# Patient Record
Sex: FEMALE | Race: WHITE | NOT HISPANIC OR LATINO | Employment: OTHER | URBAN - METROPOLITAN AREA
[De-identification: names, ages, dates, MRNs, and addresses within clinical notes are randomized per-mention and may not be internally consistent; named-entity substitution may affect disease eponyms.]

---

## 2017-03-28 ENCOUNTER — TRANSCRIBE ORDERS (OUTPATIENT)
Dept: ADMINISTRATIVE | Facility: HOSPITAL | Age: 82
End: 2017-03-28

## 2017-03-28 DIAGNOSIS — M19.011 OSTEOARTHRITIS OF RIGHT SHOULDER, UNSPECIFIED OSTEOARTHRITIS TYPE: Primary | ICD-10-CM

## 2017-04-10 ENCOUNTER — LAB (OUTPATIENT)
Dept: LAB | Facility: HOSPITAL | Age: 82
End: 2017-04-10

## 2017-04-10 ENCOUNTER — OFFICE VISIT (OUTPATIENT)
Dept: ONCOLOGY | Facility: CLINIC | Age: 82
End: 2017-04-10

## 2017-04-10 VITALS
SYSTOLIC BLOOD PRESSURE: 142 MMHG | BODY MASS INDEX: 35.9 KG/M2 | RESPIRATION RATE: 18 BRPM | TEMPERATURE: 98.1 F | OXYGEN SATURATION: 90 % | HEIGHT: 66 IN | HEART RATE: 68 BPM | WEIGHT: 223.4 LBS | DIASTOLIC BLOOD PRESSURE: 84 MMHG

## 2017-04-10 DIAGNOSIS — C50.919 MALIGNANT NEOPLASM OF FEMALE BREAST, UNSPECIFIED LATERALITY, UNSPECIFIED SITE OF BREAST: ICD-10-CM

## 2017-04-10 DIAGNOSIS — C50.919 MALIGNANT NEOPLASM OF FEMALE BREAST, UNSPECIFIED LATERALITY, UNSPECIFIED SITE OF BREAST: Primary | ICD-10-CM

## 2017-04-10 LAB
ALBUMIN SERPL-MCNC: 4 G/DL (ref 3.5–5.2)
ALBUMIN/GLOB SERPL: 1.1 G/DL (ref 1.1–2.4)
ALP SERPL-CCNC: 76 U/L (ref 38–116)
ALT SERPL W P-5'-P-CCNC: 15 U/L (ref 0–33)
ANION GAP SERPL CALCULATED.3IONS-SCNC: 8.9 MMOL/L
AST SERPL-CCNC: 20 U/L (ref 0–32)
BASOPHILS # BLD AUTO: 0.09 10*3/MM3 (ref 0–0.1)
BASOPHILS NFR BLD AUTO: 1.5 % (ref 0–1.1)
BILIRUB SERPL-MCNC: 0.3 MG/DL (ref 0.1–1.2)
BUN BLD-MCNC: 14 MG/DL (ref 6–20)
BUN/CREAT SERPL: 19.7 (ref 7.3–30)
CALCIUM SPEC-SCNC: 9.6 MG/DL (ref 8.5–10.2)
CHLORIDE SERPL-SCNC: 96 MMOL/L (ref 98–107)
CO2 SERPL-SCNC: 30.1 MMOL/L (ref 22–29)
CREAT BLD-MCNC: 0.71 MG/DL (ref 0.6–1.1)
DEPRECATED RDW RBC AUTO: 47.8 FL (ref 37–49)
EOSINOPHIL # BLD AUTO: 0.25 10*3/MM3 (ref 0–0.36)
EOSINOPHIL NFR BLD AUTO: 4 % (ref 1–5)
ERYTHROCYTE [DISTWIDTH] IN BLOOD BY AUTOMATED COUNT: 13.5 % (ref 11.7–14.5)
GFR SERPL CREATININE-BSD FRML MDRD: 79 ML/MIN/1.73
GLOBULIN UR ELPH-MCNC: 3.5 GM/DL (ref 1.8–3.5)
GLUCOSE BLD-MCNC: 79 MG/DL (ref 74–124)
HCT VFR BLD AUTO: 36.2 % (ref 34–45)
HGB BLD-MCNC: 11.7 G/DL (ref 11.5–14.9)
HOLD SPECIMEN: NORMAL
IMM GRANULOCYTES # BLD: 0.01 10*3/MM3 (ref 0–0.03)
IMM GRANULOCYTES NFR BLD: 0.2 % (ref 0–0.5)
LYMPHOCYTES # BLD AUTO: 1.38 10*3/MM3 (ref 1–3.5)
LYMPHOCYTES NFR BLD AUTO: 22.3 % (ref 20–49)
MCH RBC QN AUTO: 31 PG (ref 27–33)
MCHC RBC AUTO-ENTMCNC: 32.3 G/DL (ref 32–35)
MCV RBC AUTO: 96 FL (ref 83–97)
MONOCYTES # BLD AUTO: 0.66 10*3/MM3 (ref 0.25–0.8)
MONOCYTES NFR BLD AUTO: 10.6 % (ref 4–12)
NEUTROPHILS # BLD AUTO: 3.81 10*3/MM3 (ref 1.5–7)
NEUTROPHILS NFR BLD AUTO: 61.4 % (ref 39–75)
NRBC BLD MANUAL-RTO: 0 /100 WBC (ref 0–0)
PLATELET # BLD AUTO: 256 10*3/MM3 (ref 150–375)
PMV BLD AUTO: 8.8 FL (ref 8.9–12.1)
POTASSIUM BLD-SCNC: 4.5 MMOL/L (ref 3.5–4.7)
PROT SERPL-MCNC: 7.5 G/DL (ref 6.3–8)
RBC # BLD AUTO: 3.77 10*6/MM3 (ref 3.9–5)
SODIUM BLD-SCNC: 135 MMOL/L (ref 134–145)
WBC NRBC COR # BLD: 6.2 10*3/MM3 (ref 4–10)

## 2017-04-10 PROCEDURE — 36415 COLL VENOUS BLD VENIPUNCTURE: CPT

## 2017-04-10 PROCEDURE — 85025 COMPLETE CBC W/AUTO DIFF WBC: CPT

## 2017-04-10 PROCEDURE — 99213 OFFICE O/P EST LOW 20 MIN: CPT | Performed by: INTERNAL MEDICINE

## 2017-04-10 PROCEDURE — 80053 COMPREHEN METABOLIC PANEL: CPT

## 2017-04-10 NOTE — PROGRESS NOTES
REASONS FOR FOLLOWUP:  1. 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 ER positive HER2/joelle negative.   2. Initiated Arimidex on 04/30/2012.  3. Osteoporosis currently managed by her primary care physician, Dr. Anne Brooks.   4. 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 Nguyen on 4/25/13 (DCIS measured 2.5 cm, high grade with comedo necrosis and widely negative margins).  5. Iron deficiency anemia, improved with oral iron therapy--currently managed by         History of Present Illness    Ms. Tidwell returns today for follow-up of her history of breast cancer.  She returns today doing very well.  She has noted no changes to the scars of the mastectomy sites bilaterally.  She denies unusual pain other than right shoulder pain secondary to arthritis and is scheduled to have a shoulder replacement in the near future.  She denies shortness of breath or other concerns.    Past Medical History, Past Surgical History, Social History, Family History have been reviewed and are without significant changes except as mentioned.    Review of Systems   A comprehensive 14 point review of systems was performed and was negative except as mentioned.    Medications:  The current medication list was reviewed in the EMR    ALLERGIES:    Allergies   Allergen Reactions   • Aspirin    • Grass    • Morphine And Related    • Other      Trees       Objective      There were no vitals filed for this visit.  Current Status 10/3/2016   ECOG score 1       Physical Exam   Constitutional: She is oriented to person, place, and time. She appears well-developed and well-nourished.   Cardiovascular: Normal rate and regular rhythm.    Pulmonary/Chest: Effort normal and breath sounds normal.   bilat mastectomy scars well healed with no nodules or rashes   Musculoskeletal:   Decreased range of motion right shoulder   Lymphadenopathy:     She has no cervical adenopathy.   Neurological: She is alert  and oriented to person, place, and time.   Skin: Skin is warm.   Psychiatric: She has a normal mood and affect.          RECENT LABS:  Hematology WBC   Date Value Ref Range Status   10/03/2016 6.72 4.00 - 10.00 10*3/mm3 Final     RBC   Date Value Ref Range Status   10/03/2016 3.87 (L) 3.90 - 5.00 10*6/mm3 Final     Hemoglobin   Date Value Ref Range Status   10/03/2016 12.2 11.5 - 14.9 g/dL Final     Hematocrit   Date Value Ref Range Status   10/03/2016 37.0 34.0 - 45.0 % Final     Platelets   Date Value Ref Range Status   10/03/2016 276 150 - 375 10*3/mm3 Final          Assessment/Plan   1. Pathologic [id number] ductal cancer of the right breast, status post mastectomy in  April 2012 for which she has been on adjuvant Arimidex since 04/30/12 and now completed 5 years of hormonal therapy.  I recommended that she discontinue Arimidex at this time having completed adequate adjuvant therapy.  2. DCIS of the left breast, status post mastectomy 05/26/2013 with wide margins.   3.  History of iron deficiency anemia, hemoglobin normal today's visit.  She has been off oral iron for several months.  4.  Osteoporosis being managed by her PCP.    5.  Since the patient has completed 5 years of adjuvant hormonal therapy for breast cancer, she would prefer to follow routinely with her primary care physician.  I recommended she have a chest wall exam once or twice yearly.  If she develops any unusual pain, shortness of breath, etc. imaging should be performed in the setting of her breast cancer history.  I would be happy to see the patient back at any time if the need arises.                  4/10/2017      CC:

## 2017-06-08 ENCOUNTER — APPOINTMENT (OUTPATIENT)
Dept: PREADMISSION TESTING | Facility: HOSPITAL | Age: 82
End: 2017-06-08

## 2017-06-08 ENCOUNTER — HOSPITAL ENCOUNTER (OUTPATIENT)
Dept: CT IMAGING | Facility: HOSPITAL | Age: 82
Discharge: HOME | End: 2017-06-08
Attending: ORTHOPAEDIC SURGERY | Admitting: ORTHOPAEDIC SURGERY

## 2017-06-08 ENCOUNTER — HOSPITAL ENCOUNTER (OUTPATIENT)
Dept: GENERAL RADIOLOGY | Facility: HOSPITAL | Age: 82
Discharge: HOME | End: 2017-06-08

## 2017-06-08 DIAGNOSIS — M19.011 OSTEOARTHRITIS OF RIGHT SHOULDER, UNSPECIFIED OSTEOARTHRITIS TYPE: ICD-10-CM

## 2017-06-08 LAB
ABO GROUP BLD: NORMAL
ANION GAP SERPL CALCULATED.3IONS-SCNC: 10.9 MMOL/L
ANTI-E: NORMAL
BACTERIA UR QL AUTO: NORMAL /HPF
BILIRUB UR QL STRIP: NEGATIVE
BLD GP AB SCN SERPL QL: POSITIVE
BUN BLD-MCNC: 11 MG/DL (ref 8–23)
BUN/CREAT SERPL: 14.3 (ref 7–25)
CALCIUM SPEC-SCNC: 9.5 MG/DL (ref 8.6–10.5)
CHLORIDE SERPL-SCNC: 95 MMOL/L (ref 98–107)
CLARITY UR: CLEAR
CO2 SERPL-SCNC: 29.1 MMOL/L (ref 22–29)
COLOR UR: YELLOW
CREAT BLD-MCNC: 0.77 MG/DL (ref 0.57–1)
DEPRECATED RDW RBC AUTO: 46.8 FL (ref 37–54)
E AG RBC QL: NEGATIVE
ERYTHROCYTE [DISTWIDTH] IN BLOOD BY AUTOMATED COUNT: 13.5 % (ref 11.7–13)
GFR SERPL CREATININE-BSD FRML MDRD: 72 ML/MIN/1.73
GLUCOSE BLD-MCNC: 103 MG/DL (ref 65–99)
GLUCOSE UR STRIP-MCNC: NEGATIVE MG/DL
HCT VFR BLD AUTO: 36.5 % (ref 35.6–45.5)
HGB BLD-MCNC: 12.1 G/DL (ref 11.9–15.5)
HGB UR QL STRIP.AUTO: NEGATIVE
HYALINE CASTS UR QL AUTO: NORMAL /LPF
KETONES UR QL STRIP: NEGATIVE
LEUKOCYTE ESTERASE UR QL STRIP.AUTO: ABNORMAL
LITTLE C: NEGATIVE
MCH RBC QN AUTO: 31.5 PG (ref 26.9–32)
MCHC RBC AUTO-ENTMCNC: 33.2 G/DL (ref 32.4–36.3)
MCV RBC AUTO: 95.1 FL (ref 80.5–98.2)
NITRITE UR QL STRIP: NEGATIVE
PH UR STRIP.AUTO: 7.5 [PH] (ref 5–8)
PLATELET # BLD AUTO: 275 10*3/MM3 (ref 140–500)
PMV BLD AUTO: 9.8 FL (ref 6–12)
POTASSIUM BLD-SCNC: 4.2 MMOL/L (ref 3.5–5.2)
PROT UR QL STRIP: NEGATIVE
RBC # BLD AUTO: 3.84 10*6/MM3 (ref 3.9–5.2)
RBC # UR: NORMAL /HPF
REF LAB TEST METHOD: NORMAL
RH BLD: POSITIVE
SODIUM BLD-SCNC: 135 MMOL/L (ref 136–145)
SP GR UR STRIP: 1.01 (ref 1–1.03)
SQUAMOUS #/AREA URNS HPF: NORMAL /HPF
UROBILINOGEN UR QL STRIP: ABNORMAL
WBC NRBC COR # BLD: 6.52 10*3/MM3 (ref 4.5–10.7)
WBC UR QL AUTO: NORMAL /HPF

## 2017-06-08 PROCEDURE — 86900 BLOOD TYPING SEROLOGIC ABO: CPT | Performed by: ORTHOPAEDIC SURGERY

## 2017-06-08 PROCEDURE — 86905 BLOOD TYPING RBC ANTIGENS: CPT | Performed by: ORTHOPAEDIC SURGERY

## 2017-06-08 PROCEDURE — 36415 COLL VENOUS BLD VENIPUNCTURE: CPT

## 2017-06-08 PROCEDURE — 86902 BLOOD TYPE ANTIGEN DONOR EA: CPT

## 2017-06-08 PROCEDURE — 86850 RBC ANTIBODY SCREEN: CPT | Performed by: ORTHOPAEDIC SURGERY

## 2017-06-08 PROCEDURE — 93010 ELECTROCARDIOGRAM REPORT: CPT | Performed by: INTERNAL MEDICINE

## 2017-06-08 PROCEDURE — 81001 URINALYSIS AUTO W/SCOPE: CPT | Performed by: ORTHOPAEDIC SURGERY

## 2017-06-08 PROCEDURE — 86870 RBC ANTIBODY IDENTIFICATION: CPT | Performed by: ORTHOPAEDIC SURGERY

## 2017-06-08 PROCEDURE — 86920 COMPATIBILITY TEST SPIN: CPT

## 2017-06-08 PROCEDURE — 93005 ELECTROCARDIOGRAM TRACING: CPT

## 2017-06-08 PROCEDURE — 73030 X-RAY EXAM OF SHOULDER: CPT

## 2017-06-08 PROCEDURE — 86922 COMPATIBILITY TEST ANTIGLOB: CPT

## 2017-06-08 PROCEDURE — 85027 COMPLETE CBC AUTOMATED: CPT | Performed by: ORTHOPAEDIC SURGERY

## 2017-06-08 PROCEDURE — 80048 BASIC METABOLIC PNL TOTAL CA: CPT | Performed by: ORTHOPAEDIC SURGERY

## 2017-06-08 PROCEDURE — 86901 BLOOD TYPING SEROLOGIC RH(D): CPT | Performed by: ORTHOPAEDIC SURGERY

## 2017-06-08 PROCEDURE — 73200 CT UPPER EXTREMITY W/O DYE: CPT

## 2017-06-08 NOTE — DISCHARGE INSTRUCTIONS
Take the following medications the morning of surgery with a small sip of water:        General Instructions:  • Do not eat solid food after midnight the night before surgery.  • You may drink clear liquids day of surgery but must stop at least one hour before your hospital arrival time.  • It is beneficial for you to have a clear drink that contains carbohydrates the day of surgery.  We suggest a 20 ounce bottle of Gatorade or Powerade for non-diabetic patients or a 20 ounce bottle of G2 or Powerade Zero for diabetic patients. (Pediatric patients, are not advised to drink a 20 ounce carbohydrate drink)    Clear liquids are liquids you can see through.  Nothing red in color.     Plain water                               Sports drinks  Sodas                                   Gelatin (Jell-O)  Fruit juices without pulp such as white grape juice and apple juice  Popsicles that contain no fruit or yogurt  Tea or coffee (no cream or milk added)  Gatorade / Powerade  G2 / Powerade Zero    • Infants may have breast milk up to four hours before surgery.  • Infants drinking formula may drink formula up to six hours before surgery.   • Patients who avoid smoking, chewing tobacco and alcohol for 4 weeks prior to surgery have a reduced risk of post-operative complications.  Quit smoking as many days before surgery as you can.  • Do not smoke, use chewing tobacco or drink alcohol the day of surgery.   • If applicable bring your C-PAP/ BI-PAP machine.  • Bring any papers given to you in the doctor’s office.  • Wear clean comfortable clothes and socks.  • Do not wear contact lenses or make-up.  Bring a case for your glasses.   • Bring crutches or walker if applicable.  • Leave all other valuables and jewelry at home.  • The Pre-Admission Testing nurse will instruct you to bring medications if unable to obtain an accurate list in Pre-Admission Testing.        If you were given a blood bank ID arm band remember to bring it with you  the day of surgery.    Preventing a Surgical Site Infection:  • For 2 to 3 days before surgery, avoid shaving with a razor because the razor can irritate skin and make it easier to develop an infection.  • The night prior to surgery sleep in a clean bed with clean clothing.  Do not allow pets to sleep with you.  • Shower on the morning of surgery using a fresh bar of anti-bacterial soap (such as Dial) and clean washcloth.  Dry with a clean towel and dress in clean clothing.  • Ask your surgeon if you will be receiving antibiotics prior to surgery.  • Make sure you, your family, and all healthcare providers clean their hands with soap and water or an alcohol based hand  before caring for you or your wound.    Day of surgery:  Upon arrival, a Pre-op nurse and Anesthesiologist will review your health history, obtain vital signs, and answer questions you may have.  The only belongings needed at this time will be your home medications and if applicable your C-PAP/BI-PAP machine.  If you are staying overnight your family can leave the rest of your belongings in the car and bring them to your room later.  A Pre-op nurse will start an IV and you may receive medication in preparation for surgery, including something to help you relax.  Your family will be able to see you in the Pre-op area.  While you are in surgery your family should notify the waiting room  if they leave the waiting room area and provide a contact phone number.    Please be aware that surgery does come with discomfort.  We want to make every effort to control your discomfort so please discuss any uncontrolled symptoms with your nurse.   Your doctor will most likely have prescribed pain medications.      If you are going home after surgery you will receive individualized written care instructions before being discharged.  A responsible adult must drive you to and from the hospital on the day of your surgery and stay with you for 24  hours.    If you are staying overnight following surgery, you will be transported to your hospital room following the recovery period.  [facility] has all private rooms.    If you have any questions please call Pre-Admission Testing at [phone].  Deductibles and co-payments are collected on the day of service. Please be prepared to pay the required co-pay, deductible or deposit on the day of service as defined by your plan.Take the following medications the morning of surgery with a small sip of water:        General Instructions:  • Do not eat solid food after midnight the night before surgery.  • You may drink clear liquids day of surgery but must stop at least one hour before your hospital arrival time.  • It is beneficial for you to have a clear drink that contains carbohydrates the day of surgery.  We suggest a 20 ounce bottle of Gatorade or Powerade for non-diabetic patients or a 20 ounce bottle of G2 or Powerade Zero for diabetic patients. (Pediatric patients, are not advised to drink a 20 ounce carbohydrate drink)    Clear liquids are liquids you can see through.  Nothing red in color.     Plain water                               Sports drinks  Sodas                                   Gelatin (Jell-O)  Fruit juices without pulp such as white grape juice and apple juice  Popsicles that contain no fruit or yogurt  Tea or coffee (no cream or milk added)  Gatorade / Powerade  G2 / Powerade Zero    • Infants may have breast milk up to four hours before surgery.  • Infants drinking formula may drink formula up to six hours before surgery.   • Patients who avoid smoking, chewing tobacco and alcohol for 4 weeks prior to surgery have a reduced risk of post-operative complications.  Quit smoking as many days before surgery as you can.  • Do not smoke, use chewing tobacco or drink alcohol the day of surgery.   • If applicable bring your C-PAP/ BI-PAP machine.  • Bring any papers given to you in the  doctor’s office.  • Wear clean comfortable clothes and socks.  • Do not wear contact lenses or make-up.  Bring a case for your glasses.   • Bring crutches or walker if applicable.  • Leave all other valuables and jewelry at home.  • The Pre-Admission Testing nurse will instruct you to bring medications if unable to obtain an accurate list in Pre-Admission Testing.        If you were given a blood bank ID arm band remember to bring it with you the day of surgery.    Preventing a Surgical Site Infection:  • For 2 to 3 days before surgery, avoid shaving with a razor because the razor can irritate skin and make it easier to develop an infection.  • The night prior to surgery sleep in a clean bed with clean clothing.  Do not allow pets to sleep with you.  • Shower on the morning of surgery using a fresh bar of anti-bacterial soap (such as Dial) and clean washcloth.  Dry with a clean towel and dress in clean clothing.  • Ask your surgeon if you will be receiving antibiotics prior to surgery.  • Make sure you, your family, and all healthcare providers clean their hands with soap and water or an alcohol based hand  before caring for you or your wound.    Day of surgery:  Upon arrival, a Pre-op nurse and Anesthesiologist will review your health history, obtain vital signs, and answer questions you may have.  The only belongings needed at this time will be your home medications and if applicable your C-PAP/BI-PAP machine.  If you are staying overnight your family can leave the rest of your belongings in the car and bring them to your room later.  A Pre-op nurse will start an IV and you may receive medication in preparation for surgery, including something to help you relax.  Your family will be able to see you in the Pre-op area.  While you are in surgery your family should notify the waiting room  if they leave the waiting room area and provide a contact phone number.    Please be aware that surgery does  come with discomfort.  We want to make every effort to control your discomfort so please discuss any uncontrolled symptoms with your nurse.   Your doctor will most likely have prescribed pain medications.      If you are going home after surgery you will receive individualized written care instructions before being discharged.  A responsible adult must drive you to and from the hospital on the day of your surgery and stay with you for 24 hours.    If you are staying overnight following surgery, you will be transported to your hospital room following the recovery period.  [facility] has all private rooms.    If you have any questions please call Pre-Admission Testing at [phone].  Deductibles and co-payments are collected on the day of service. Please be prepared to pay the required co-pay, deductible or deposit on the day of service as defined by your plan.    2% CHLORAHEXIDINE GLUCONATE* CLOTH  Preparing or “prepping” skin before surgery can reduce the risk of infection at the surgical site. To make the process easier, [facility] has chosen disposable cloths moistened with a rinse-free, 2% Chlorhexidine Gluconate (CHG) antiseptic solution. The steps below outline the prepping process and should be carefully followed.        Use the prep cloth on the area that is circled in the diagram             Directions Night before Surgery  1) Shower using a fresh bar of anti-bacterial soap (such as Dial) and clean washcloth.  Use a clean towel to completely dry your skin.  2) Do not use any lotions, oils or creams on your skin.  3) Open the package and remove 1 cloth, wipe your skin for 30 seconds in a circular motion.  Allow to dry for 3 minutes.  4) Repeat #3 with second cloth.  5) Do not touch your eyes, ears, or mouth with the prep cloth.  6) Allow the wet prep solution to air dry.  7) Discard the prep cloth and wash your hands with soap and water.   8) Dress in clean bed clothes and sleep on  fresh clean bed sheets.   9) You may experience some temporary itching after the prep.    Directions Day of Surgery  1) Repeat steps 1,2,3,4,5,6,7, and 9.   2) Dress in clean clothes before coming to the hospital.    BACTROBAN NASAL OINTMENT  There are many germs normally in your nose. Bactroban is an ointment that will help reduce these germs. Please follow these instructions for Bactroban use:    ____Two days before surgery in the evening Date________    ____The day before surgery in the morning  Date________    ____The day before surgery in the evening              Date________    ____The day of surgery in the morning    Date________    **Squirt ½ package of Bactroban Ointment onto a cotton applicator and apply to inside of 1st nostril.  Squirt the remaining Bactroban and apply to the inside of the other nostril.    PERIDEX- ORAL:  Use only if your surgeon has ordered  Use the night before and morning of surgery - Swish, gargle, and spit - do not swallow.USE AS DIRECTED PREOPUSE AS DIRECTED PREOP

## 2017-06-10 PROCEDURE — 86902 BLOOD TYPE ANTIGEN DONOR EA: CPT

## 2017-06-22 ENCOUNTER — ANESTHESIA EVENT (OUTPATIENT)
Dept: PERIOP | Facility: HOSPITAL | Age: 82
End: 2017-06-22

## 2017-06-22 ENCOUNTER — HOSPITAL ENCOUNTER (INPATIENT)
Facility: HOSPITAL | Age: 82
LOS: 1 days | Discharge: HOME | End: 2017-06-23
Attending: ORTHOPAEDIC SURGERY | Admitting: ORTHOPAEDIC SURGERY

## 2017-06-22 ENCOUNTER — ANESTHESIA (OUTPATIENT)
Dept: PERIOP | Facility: HOSPITAL | Age: 82
End: 2017-06-22

## 2017-06-22 ENCOUNTER — APPOINTMENT (OUTPATIENT)
Dept: GENERAL RADIOLOGY | Facility: HOSPITAL | Age: 82
End: 2017-06-22

## 2017-06-22 DIAGNOSIS — R26.89 IMPAIRED GAIT AND MOBILITY: Primary | ICD-10-CM

## 2017-06-22 PROBLEM — M25.311 ROTATOR CUFF INSUFFICIENCY OF RIGHT SHOULDER: Status: ACTIVE | Noted: 2017-06-22

## 2017-06-22 PROCEDURE — 25010000002 FENTANYL CITRATE (PF) 100 MCG/2ML SOLUTION: Performed by: ANESTHESIOLOGY

## 2017-06-22 PROCEDURE — 0RRJ00Z REPLACEMENT OF RIGHT SHOULDER JOINT WITH REVERSE BALL AND SOCKET SYNTHETIC SUBSTITUTE, OPEN APPROACH: ICD-10-PCS | Performed by: ORTHOPAEDIC SURGERY

## 2017-06-22 PROCEDURE — 25010000002 PROPOFOL 10 MG/ML EMULSION: Performed by: NURSE ANESTHETIST, CERTIFIED REGISTERED

## 2017-06-22 PROCEDURE — 25010000002 METHYLPREDNISOLONE PER 125 MG: Performed by: ANESTHESIOLOGY

## 2017-06-22 PROCEDURE — 25010000002 ONDANSETRON PER 1 MG: Performed by: ORTHOPAEDIC SURGERY

## 2017-06-22 PROCEDURE — C1776 JOINT DEVICE (IMPLANTABLE): HCPCS | Performed by: ORTHOPAEDIC SURGERY

## 2017-06-22 PROCEDURE — 94799 UNLISTED PULMONARY SVC/PX: CPT

## 2017-06-22 PROCEDURE — 73020 X-RAY EXAM OF SHOULDER: CPT

## 2017-06-22 PROCEDURE — 25010000002 VANCOMYCIN: Performed by: ORTHOPAEDIC SURGERY

## 2017-06-22 PROCEDURE — 97162 PT EVAL MOD COMPLEX 30 MIN: CPT

## 2017-06-22 PROCEDURE — 25010000002 ROPIVACAINE PER 1 MG: Performed by: ANESTHESIOLOGY

## 2017-06-22 PROCEDURE — 94640 AIRWAY INHALATION TREATMENT: CPT

## 2017-06-22 PROCEDURE — 25010000002 PHENYLEPHRINE PER 1 ML: Performed by: NURSE ANESTHETIST, CERTIFIED REGISTERED

## 2017-06-22 PROCEDURE — C1713 ANCHOR/SCREW BN/BN,TIS/BN: HCPCS | Performed by: ORTHOPAEDIC SURGERY

## 2017-06-22 DEVICE — CMT BONE PALACOS 120001: Type: IMPLANTABLE DEVICE | Site: HUMERUS | Status: FUNCTIONAL

## 2017-06-22 DEVICE — SCRW LK EQUINOXE GLENOSPHERE REV/SHLDR: Type: IMPLANTABLE DEVICE | Site: SHOULDER | Status: FUNCTIONAL

## 2017-06-22 DEVICE — TOTL SHLDR AUG PLT ARTHROPLASTY UPCHRG: Type: IMPLANTABLE DEVICE | Site: HUMERUS | Status: FUNCTIONAL

## 2017-06-22 DEVICE — SCRW COMPR EQUINOXE LK 4.5X22MM: Type: IMPLANTABLE DEVICE | Site: SHOULDER | Status: FUNCTIONAL

## 2017-06-22 DEVICE — SCRW COMPR EQUINOXE LK 4.5X34MM: Type: IMPLANTABLE DEVICE | Site: SHOULDER | Status: FUNCTIONAL

## 2017-06-22 DEVICE — SCRW COMPR EQUINOXE LK 4.5X30MM: Type: IMPLANTABLE DEVICE | Site: SHOULDER | Status: FUNCTIONAL

## 2017-06-22 DEVICE — PLT GLEN JNT EQUINOXE AUG POST 8DEG RT: Type: IMPLANTABLE DEVICE | Site: SHOULDER | Status: FUNCTIONAL

## 2017-06-22 DEVICE — LINER HUM EQUINOXE REV SHLDR 38 PLS0: Type: IMPLANTABLE DEVICE | Site: HUMERUS | Status: FUNCTIONAL

## 2017-06-22 DEVICE — SCRW EQUINOXE TORQ DEFINE REV SHLDR KT: Type: IMPLANTABLE DEVICE | Site: SHOULDER | Status: FUNCTIONAL

## 2017-06-22 DEVICE — GLENOSPHERE SHLDR/REV EQUINOXE 38MM: Type: IMPLANTABLE DEVICE | Site: SHOULDER | Status: FUNCTIONAL

## 2017-06-22 DEVICE — IMPLANTABLE DEVICE: Type: IMPLANTABLE DEVICE | Site: HUMERUS | Status: FUNCTIONAL

## 2017-06-22 DEVICE — TRY HUM EQUINOXE ADPT REV SHLDR PLS10: Type: IMPLANTABLE DEVICE | Site: HUMERUS | Status: FUNCTIONAL

## 2017-06-22 DEVICE — STEM HUM PRI EQUINOXE PRESSFIT 15MM: Type: IMPLANTABLE DEVICE | Site: HUMERUS | Status: FUNCTIONAL

## 2017-06-22 RX ORDER — BISACODYL 10 MG
10 SUPPOSITORY, RECTAL RECTAL DAILY PRN
Status: DISCONTINUED | OUTPATIENT
Start: 2017-06-22 | End: 2017-06-23 | Stop reason: HOSPADM

## 2017-06-22 RX ORDER — SODIUM CHLORIDE 0.9 % (FLUSH) 0.9 %
1-10 SYRINGE (ML) INJECTION AS NEEDED
Status: DISCONTINUED | OUTPATIENT
Start: 2017-06-22 | End: 2017-06-22 | Stop reason: HOSPADM

## 2017-06-22 RX ORDER — FAMOTIDINE 10 MG/ML
20 INJECTION, SOLUTION INTRAVENOUS ONCE
Status: DISCONTINUED | OUTPATIENT
Start: 2017-06-22 | End: 2017-06-22 | Stop reason: HOSPADM

## 2017-06-22 RX ORDER — HYDRALAZINE HYDROCHLORIDE 20 MG/ML
5 INJECTION INTRAMUSCULAR; INTRAVENOUS
Status: DISCONTINUED | OUTPATIENT
Start: 2017-06-22 | End: 2017-06-22 | Stop reason: HOSPADM

## 2017-06-22 RX ORDER — VALSARTAN 160 MG/1
160 TABLET ORAL 2 TIMES DAILY
Status: DISCONTINUED | OUTPATIENT
Start: 2017-06-22 | End: 2017-06-23 | Stop reason: HOSPADM

## 2017-06-22 RX ORDER — PROMETHAZINE HYDROCHLORIDE 25 MG/1
25 SUPPOSITORY RECTAL ONCE AS NEEDED
Status: DISCONTINUED | OUTPATIENT
Start: 2017-06-22 | End: 2017-06-22 | Stop reason: HOSPADM

## 2017-06-22 RX ORDER — MISOPROSTOL 200 UG/1
200 TABLET ORAL 2 TIMES DAILY
Status: DISCONTINUED | OUTPATIENT
Start: 2017-06-22 | End: 2017-06-23 | Stop reason: HOSPADM

## 2017-06-22 RX ORDER — FENTANYL CITRATE 50 UG/ML
50 INJECTION, SOLUTION INTRAMUSCULAR; INTRAVENOUS
Status: DISCONTINUED | OUTPATIENT
Start: 2017-06-22 | End: 2017-06-22 | Stop reason: HOSPADM

## 2017-06-22 RX ORDER — PROMETHAZINE HYDROCHLORIDE 25 MG/ML
12.5 INJECTION, SOLUTION INTRAMUSCULAR; INTRAVENOUS ONCE AS NEEDED
Status: DISCONTINUED | OUTPATIENT
Start: 2017-06-22 | End: 2017-06-22 | Stop reason: HOSPADM

## 2017-06-22 RX ORDER — LEVOTHYROXINE SODIUM 88 UG/1
88 TABLET ORAL EVERY MORNING
Status: DISCONTINUED | OUTPATIENT
Start: 2017-06-23 | End: 2017-06-23 | Stop reason: HOSPADM

## 2017-06-22 RX ORDER — HYDROMORPHONE HYDROCHLORIDE 1 MG/ML
0.5 INJECTION, SOLUTION INTRAMUSCULAR; INTRAVENOUS; SUBCUTANEOUS
Status: DISCONTINUED | OUTPATIENT
Start: 2017-06-22 | End: 2017-06-22 | Stop reason: HOSPADM

## 2017-06-22 RX ORDER — NALOXONE HCL 0.4 MG/ML
0.1 VIAL (ML) INJECTION
Status: DISCONTINUED | OUTPATIENT
Start: 2017-06-22 | End: 2017-06-23 | Stop reason: HOSPADM

## 2017-06-22 RX ORDER — MIDAZOLAM HYDROCHLORIDE 1 MG/ML
2 INJECTION INTRAMUSCULAR; INTRAVENOUS
Status: DISCONTINUED | OUTPATIENT
Start: 2017-06-22 | End: 2017-06-22 | Stop reason: HOSPADM

## 2017-06-22 RX ORDER — ONDANSETRON 2 MG/ML
4 INJECTION INTRAMUSCULAR; INTRAVENOUS ONCE AS NEEDED
Status: DISCONTINUED | OUTPATIENT
Start: 2017-06-22 | End: 2017-06-22 | Stop reason: HOSPADM

## 2017-06-22 RX ORDER — ROPIVACAINE HYDROCHLORIDE 5 MG/ML
INJECTION, SOLUTION EPIDURAL; INFILTRATION; PERINEURAL AS NEEDED
Status: DISCONTINUED | OUTPATIENT
Start: 2017-06-22 | End: 2017-06-22 | Stop reason: SURG

## 2017-06-22 RX ORDER — FLUTICASONE PROPIONATE 50 MCG
1 SPRAY, SUSPENSION (ML) NASAL DAILY
Status: DISCONTINUED | OUTPATIENT
Start: 2017-06-23 | End: 2017-06-23 | Stop reason: HOSPADM

## 2017-06-22 RX ORDER — PROMETHAZINE HYDROCHLORIDE 25 MG/1
12.5 TABLET ORAL ONCE AS NEEDED
Status: DISCONTINUED | OUTPATIENT
Start: 2017-06-22 | End: 2017-06-22 | Stop reason: HOSPADM

## 2017-06-22 RX ORDER — LABETALOL HYDROCHLORIDE 5 MG/ML
5 INJECTION, SOLUTION INTRAVENOUS
Status: DISCONTINUED | OUTPATIENT
Start: 2017-06-22 | End: 2017-06-22 | Stop reason: HOSPADM

## 2017-06-22 RX ORDER — OXYCODONE AND ACETAMINOPHEN 7.5; 325 MG/1; MG/1
1 TABLET ORAL ONCE AS NEEDED
Status: DISCONTINUED | OUTPATIENT
Start: 2017-06-22 | End: 2017-06-22 | Stop reason: HOSPADM

## 2017-06-22 RX ORDER — OXYCODONE HYDROCHLORIDE AND ACETAMINOPHEN 5; 325 MG/1; MG/1
2 TABLET ORAL EVERY 4 HOURS PRN
Status: DISCONTINUED | OUTPATIENT
Start: 2017-06-22 | End: 2017-06-23 | Stop reason: HOSPADM

## 2017-06-22 RX ORDER — SERTRALINE HYDROCHLORIDE 100 MG/1
100 TABLET, FILM COATED ORAL EVERY EVENING
Status: DISCONTINUED | OUTPATIENT
Start: 2017-06-22 | End: 2017-06-23 | Stop reason: HOSPADM

## 2017-06-22 RX ORDER — MAGNESIUM HYDROXIDE 1200 MG/15ML
LIQUID ORAL AS NEEDED
Status: DISCONTINUED | OUTPATIENT
Start: 2017-06-22 | End: 2017-06-22 | Stop reason: HOSPADM

## 2017-06-22 RX ORDER — PROMETHAZINE HYDROCHLORIDE 25 MG/1
25 TABLET ORAL ONCE AS NEEDED
Status: DISCONTINUED | OUTPATIENT
Start: 2017-06-22 | End: 2017-06-22 | Stop reason: HOSPADM

## 2017-06-22 RX ORDER — PANTOPRAZOLE SODIUM 40 MG/1
40 TABLET, DELAYED RELEASE ORAL
Status: DISCONTINUED | OUTPATIENT
Start: 2017-06-22 | End: 2017-06-23 | Stop reason: HOSPADM

## 2017-06-22 RX ORDER — ALBUTEROL SULFATE 2.5 MG/3ML
2.5 SOLUTION RESPIRATORY (INHALATION)
Status: DISCONTINUED | OUTPATIENT
Start: 2017-06-22 | End: 2017-06-23 | Stop reason: HOSPADM

## 2017-06-22 RX ORDER — SODIUM CHLORIDE 450 MG/100ML
75 INJECTION, SOLUTION INTRAVENOUS CONTINUOUS
Status: DISCONTINUED | OUTPATIENT
Start: 2017-06-22 | End: 2017-06-23 | Stop reason: HOSPADM

## 2017-06-22 RX ORDER — EPHEDRINE SULFATE 50 MG/ML
INJECTION, SOLUTION INTRAVENOUS AS NEEDED
Status: DISCONTINUED | OUTPATIENT
Start: 2017-06-22 | End: 2017-06-22 | Stop reason: SURG

## 2017-06-22 RX ORDER — BACLOFEN 10 MG/1
10 TABLET ORAL AS NEEDED
Status: DISCONTINUED | OUTPATIENT
Start: 2017-06-22 | End: 2017-06-23 | Stop reason: HOSPADM

## 2017-06-22 RX ORDER — SODIUM CHLORIDE, SODIUM LACTATE, POTASSIUM CHLORIDE, CALCIUM CHLORIDE 600; 310; 30; 20 MG/100ML; MG/100ML; MG/100ML; MG/100ML
9 INJECTION, SOLUTION INTRAVENOUS CONTINUOUS
Status: DISCONTINUED | OUTPATIENT
Start: 2017-06-22 | End: 2017-06-23 | Stop reason: HOSPADM

## 2017-06-22 RX ORDER — SENNA AND DOCUSATE SODIUM 50; 8.6 MG/1; MG/1
2 TABLET, FILM COATED ORAL 2 TIMES DAILY
Status: DISCONTINUED | OUTPATIENT
Start: 2017-06-22 | End: 2017-06-23 | Stop reason: HOSPADM

## 2017-06-22 RX ORDER — FLUMAZENIL 0.1 MG/ML
0.2 INJECTION INTRAVENOUS AS NEEDED
Status: DISCONTINUED | OUTPATIENT
Start: 2017-06-22 | End: 2017-06-22 | Stop reason: HOSPADM

## 2017-06-22 RX ORDER — FUROSEMIDE 20 MG/1
20 TABLET ORAL WEEKLY
Status: DISCONTINUED | OUTPATIENT
Start: 2017-06-22 | End: 2017-06-23 | Stop reason: HOSPADM

## 2017-06-22 RX ORDER — ONDANSETRON 4 MG/1
4 TABLET, FILM COATED ORAL EVERY 6 HOURS PRN
Status: DISCONTINUED | OUTPATIENT
Start: 2017-06-22 | End: 2017-06-23 | Stop reason: HOSPADM

## 2017-06-22 RX ORDER — ALBUTEROL SULFATE 90 UG/1
2 AEROSOL, METERED RESPIRATORY (INHALATION) EVERY 4 HOURS PRN
COMMUNITY
Start: 2012-06-21

## 2017-06-22 RX ORDER — HYDROCODONE BITARTRATE AND ACETAMINOPHEN 7.5; 325 MG/1; MG/1
1 TABLET ORAL ONCE AS NEEDED
Status: DISCONTINUED | OUTPATIENT
Start: 2017-06-22 | End: 2017-06-22 | Stop reason: HOSPADM

## 2017-06-22 RX ORDER — DIPHENHYDRAMINE HYDROCHLORIDE 50 MG/ML
12.5 INJECTION INTRAMUSCULAR; INTRAVENOUS
Status: DISCONTINUED | OUTPATIENT
Start: 2017-06-22 | End: 2017-06-22 | Stop reason: HOSPADM

## 2017-06-22 RX ORDER — ONDANSETRON 2 MG/ML
4 INJECTION INTRAMUSCULAR; INTRAVENOUS EVERY 6 HOURS PRN
Status: DISCONTINUED | OUTPATIENT
Start: 2017-06-22 | End: 2017-06-23 | Stop reason: HOSPADM

## 2017-06-22 RX ORDER — EPHEDRINE SULFATE 50 MG/ML
5 INJECTION, SOLUTION INTRAVENOUS ONCE AS NEEDED
Status: DISCONTINUED | OUTPATIENT
Start: 2017-06-22 | End: 2017-06-22 | Stop reason: HOSPADM

## 2017-06-22 RX ORDER — ONDANSETRON 4 MG/1
4 TABLET, ORALLY DISINTEGRATING ORAL EVERY 6 HOURS PRN
Status: DISCONTINUED | OUTPATIENT
Start: 2017-06-22 | End: 2017-06-23 | Stop reason: HOSPADM

## 2017-06-22 RX ORDER — MIDAZOLAM HYDROCHLORIDE 1 MG/ML
1 INJECTION INTRAMUSCULAR; INTRAVENOUS
Status: DISCONTINUED | OUTPATIENT
Start: 2017-06-22 | End: 2017-06-22 | Stop reason: HOSPADM

## 2017-06-22 RX ORDER — DIAZEPAM 5 MG/1
5 TABLET ORAL EVERY 6 HOURS PRN
Status: DISCONTINUED | OUTPATIENT
Start: 2017-06-22 | End: 2017-06-23 | Stop reason: HOSPADM

## 2017-06-22 RX ORDER — NAPROXEN 500 MG/1
500 TABLET ORAL 2 TIMES DAILY WITH MEALS
Status: DISCONTINUED | OUTPATIENT
Start: 2017-06-22 | End: 2017-06-23 | Stop reason: HOSPADM

## 2017-06-22 RX ORDER — AMLODIPINE BESYLATE 10 MG/1
10 TABLET ORAL EVERY MORNING
Status: DISCONTINUED | OUTPATIENT
Start: 2017-06-23 | End: 2017-06-23 | Stop reason: HOSPADM

## 2017-06-22 RX ORDER — ROCURONIUM BROMIDE 10 MG/ML
INJECTION, SOLUTION INTRAVENOUS AS NEEDED
Status: DISCONTINUED | OUTPATIENT
Start: 2017-06-22 | End: 2017-06-22 | Stop reason: SURG

## 2017-06-22 RX ORDER — KETOROLAC TROMETHAMINE 30 MG/ML
30 INJECTION, SOLUTION INTRAMUSCULAR; INTRAVENOUS EVERY 6 HOURS PRN
Status: ACTIVE | OUTPATIENT
Start: 2017-06-22 | End: 2017-06-22

## 2017-06-22 RX ORDER — ZOLPIDEM TARTRATE 5 MG/1
10 TABLET ORAL NIGHTLY PRN
Status: DISCONTINUED | OUTPATIENT
Start: 2017-06-22 | End: 2017-06-23 | Stop reason: HOSPADM

## 2017-06-22 RX ORDER — MULTIPLE VITAMINS W/ MINERALS TAB 9MG-400MCG
1 TAB ORAL DAILY
Status: DISCONTINUED | OUTPATIENT
Start: 2017-06-23 | End: 2017-06-23 | Stop reason: HOSPADM

## 2017-06-22 RX ORDER — CARVEDILOL 6.25 MG/1
6.25 TABLET ORAL 2 TIMES DAILY WITH MEALS
Status: DISCONTINUED | OUTPATIENT
Start: 2017-06-22 | End: 2017-06-23 | Stop reason: HOSPADM

## 2017-06-22 RX ORDER — NALOXONE HCL 0.4 MG/ML
0.2 VIAL (ML) INJECTION AS NEEDED
Status: DISCONTINUED | OUTPATIENT
Start: 2017-06-22 | End: 2017-06-22 | Stop reason: HOSPADM

## 2017-06-22 RX ORDER — ALBUTEROL SULFATE 90 UG/1
2 AEROSOL, METERED RESPIRATORY (INHALATION) 2 TIMES DAILY
Status: DISCONTINUED | OUTPATIENT
Start: 2017-06-22 | End: 2017-06-22 | Stop reason: CLARIF

## 2017-06-22 RX ORDER — DIPHENHYDRAMINE HCL 25 MG
25 CAPSULE ORAL EVERY 6 HOURS PRN
Status: DISCONTINUED | OUTPATIENT
Start: 2017-06-22 | End: 2017-06-23 | Stop reason: HOSPADM

## 2017-06-22 RX ORDER — LIDOCAINE HYDROCHLORIDE 10 MG/ML
INJECTION, SOLUTION INFILTRATION; PERINEURAL AS NEEDED
Status: DISCONTINUED | OUTPATIENT
Start: 2017-06-22 | End: 2017-06-22 | Stop reason: SURG

## 2017-06-22 RX ORDER — METHYLPREDNISOLONE SODIUM SUCCINATE 125 MG/2ML
INJECTION, POWDER, LYOPHILIZED, FOR SOLUTION INTRAMUSCULAR; INTRAVENOUS AS NEEDED
Status: DISCONTINUED | OUTPATIENT
Start: 2017-06-22 | End: 2017-06-22 | Stop reason: SURG

## 2017-06-22 RX ORDER — PROPOFOL 10 MG/ML
VIAL (ML) INTRAVENOUS AS NEEDED
Status: DISCONTINUED | OUTPATIENT
Start: 2017-06-22 | End: 2017-06-22 | Stop reason: SURG

## 2017-06-22 RX ORDER — LIDOCAINE HYDROCHLORIDE AND EPINEPHRINE BITARTRATE 20; .01 MG/ML; MG/ML
INJECTION, SOLUTION SUBCUTANEOUS AS NEEDED
Status: DISCONTINUED | OUTPATIENT
Start: 2017-06-22 | End: 2017-06-22 | Stop reason: SURG

## 2017-06-22 RX ADMIN — MISOPROSTOL 200 MCG: 200 TABLET ORAL at 17:59

## 2017-06-22 RX ADMIN — PANTOPRAZOLE SODIUM 40 MG: 40 TABLET, DELAYED RELEASE ORAL at 17:54

## 2017-06-22 RX ADMIN — DICLOFENAC SODIUM 50 MG: 50 TABLET, DELAYED RELEASE ORAL at 17:55

## 2017-06-22 RX ADMIN — VANCOMYCIN HYDROCHLORIDE 1500 MG: 1 INJECTION, POWDER, LYOPHILIZED, FOR SOLUTION INTRAVENOUS at 21:10

## 2017-06-22 RX ADMIN — FENTANYL CITRATE 50 MCG: 50 INJECTION INTRAMUSCULAR; INTRAVENOUS at 12:53

## 2017-06-22 RX ADMIN — METHYLPREDNISOLONE SODIUM SUCCINATE 40 MG: 125 INJECTION, POWDER, FOR SOLUTION INTRAMUSCULAR; INTRAVENOUS at 08:02

## 2017-06-22 RX ADMIN — OXYCODONE HYDROCHLORIDE AND ACETAMINOPHEN 2 TABLET: 5; 325 TABLET ORAL at 21:10

## 2017-06-22 RX ADMIN — ALBUTEROL SULFATE 2.5 MG: 2.5 SOLUTION RESPIRATORY (INHALATION) at 15:14

## 2017-06-22 RX ADMIN — EPHEDRINE SULFATE 10 MG: 50 INJECTION INTRAMUSCULAR; INTRAVENOUS; SUBCUTANEOUS at 10:05

## 2017-06-22 RX ADMIN — ROCURONIUM BROMIDE 30 MG: 10 INJECTION INTRAVENOUS at 09:07

## 2017-06-22 RX ADMIN — EPHEDRINE SULFATE 10 MG: 50 INJECTION INTRAMUSCULAR; INTRAVENOUS; SUBCUTANEOUS at 10:10

## 2017-06-22 RX ADMIN — EPHEDRINE SULFATE 10 MG: 50 INJECTION INTRAMUSCULAR; INTRAVENOUS; SUBCUTANEOUS at 09:55

## 2017-06-22 RX ADMIN — CARVEDILOL 6.25 MG: 6.25 TABLET, FILM COATED ORAL at 17:54

## 2017-06-22 RX ADMIN — LIDOCAINE HYDROCHLORIDE 5 ML: 10 INJECTION, SOLUTION INFILTRATION; PERINEURAL at 08:02

## 2017-06-22 RX ADMIN — SENNA AND DOCUSATE SODIUM 2 TABLET: 50; 8.6 TABLET, FILM COATED ORAL at 17:54

## 2017-06-22 RX ADMIN — ROPIVACAINE HYDROCHLORIDE 20 ML: 5 INJECTION, SOLUTION EPIDURAL; INFILTRATION; PERINEURAL at 08:02

## 2017-06-22 RX ADMIN — PHENYLEPHRINE HYDROCHLORIDE 100 MCG: 10 INJECTION INTRAVENOUS at 10:23

## 2017-06-22 RX ADMIN — EPHEDRINE SULFATE 5 MG: 50 INJECTION INTRAMUSCULAR; INTRAVENOUS; SUBCUTANEOUS at 09:25

## 2017-06-22 RX ADMIN — EPHEDRINE SULFATE 5 MG: 50 INJECTION INTRAMUSCULAR; INTRAVENOUS; SUBCUTANEOUS at 10:00

## 2017-06-22 RX ADMIN — MUPIROCIN: 20 OINTMENT TOPICAL at 17:54

## 2017-06-22 RX ADMIN — PROPOFOL 140 MG: 10 INJECTION, EMULSION INTRAVENOUS at 09:07

## 2017-06-22 RX ADMIN — SERTRALINE 100 MG: 100 TABLET, FILM COATED ORAL at 17:54

## 2017-06-22 RX ADMIN — LIDOCAINE HYDROCHLORIDE AND EPINEPHRINE 5 ML: 20; 10 INJECTION, SOLUTION INFILTRATION; PERINEURAL at 08:02

## 2017-06-22 RX ADMIN — EPHEDRINE SULFATE 10 MG: 50 INJECTION INTRAMUSCULAR; INTRAVENOUS; SUBCUTANEOUS at 09:50

## 2017-06-22 RX ADMIN — SODIUM CHLORIDE, POTASSIUM CHLORIDE, SODIUM LACTATE AND CALCIUM CHLORIDE: 600; 310; 30; 20 INJECTION, SOLUTION INTRAVENOUS at 11:20

## 2017-06-22 RX ADMIN — SODIUM CHLORIDE, POTASSIUM CHLORIDE, SODIUM LACTATE AND CALCIUM CHLORIDE: 600; 310; 30; 20 INJECTION, SOLUTION INTRAVENOUS at 09:07

## 2017-06-22 RX ADMIN — ONDANSETRON 4 MG: 2 INJECTION INTRAMUSCULAR; INTRAVENOUS at 23:12

## 2017-06-22 RX ADMIN — PHENYLEPHRINE HYDROCHLORIDE 100 MCG: 10 INJECTION INTRAVENOUS at 10:35

## 2017-06-22 RX ADMIN — ALBUTEROL SULFATE 2.5 MG: 2.5 SOLUTION RESPIRATORY (INHALATION) at 20:09

## 2017-06-22 RX ADMIN — VALSARTAN 160 MG: 160 TABLET ORAL at 17:54

## 2017-06-22 RX ADMIN — SUGAMMADEX 200 MG: 100 INJECTION, SOLUTION INTRAVENOUS at 11:23

## 2017-06-22 NOTE — PLAN OF CARE
Problem: Patient Care Overview (Adult)  Goal: Plan of Care Review  Outcome: Ongoing (interventions implemented as appropriate)    06/22/17 1238   Coping/Psychosocial Response Interventions   Plan Of Care Reviewed With patient   Patient Care Overview   Progress improving   Outcome Evaluation   Outcome Summary/Follow up Plan Vitaal signs stable, taking po fluids       Goal: Adult Individualization and Mutuality  Outcome: Ongoing (interventions implemented as appropriate)  Goal: Discharge Needs Assessment  Outcome: Ongoing (interventions implemented as appropriate)    06/22/17 1238   Discharge Needs Assessment   Concerns To Be Addressed no discharge needs identified

## 2017-06-22 NOTE — ANESTHESIA PROCEDURE NOTES
Peripheral Block    Patient location during procedure: pre-op  Start time: 6/22/2017 8:02 AM  Stop time: 6/22/2017 8:22 AM  Reason for block: at surgeon's request and post-op pain management  Performed by  Anesthesiologist: NEO URRUTIA  Preanesthetic Checklist  Completed: patient identified, site marked, surgical consent, pre-op evaluation, timeout performed, IV checked, risks and benefits discussed and monitors and equipment checked  Peripheral Block Prep:  Sterile barriers:cap, gloves, gown, mask and sterile barriers  Prep: ChloraPrep  Patient monitoring: blood pressure monitoring, continuous pulse oximetry and EKG  Peripheral Procedure  Sedation:yes  Guidance:ultrasound guided  Images:still images obtained    Laterality:right  Block Type:interscalene  Injection Technique:single-shot  Needle Type:echogenic  Needle Gauge:21 G  ULTRASOUND INTERPRETATION.  Using ultrasound guidance a 21 G gauge needle was placed in close proximity to the brachial plexus nerve, at which point, under ultrasound guidance anesthetic was injected in the area of the nerve and spread of the anesthesia was seen on ultrasound in close proximity thereto.  There were no abnormalities seen on ultrasound; a digital image was taken; and the patient tolerated the procedure with no complications.   Medications  Depomedrol:40 mg  Local Injected:ropivacaine 0.5%, lidocaine 1% and lidocaine 2% with epinephrine  Post Assessment  Injection Assessment: negative aspiration for heme, no paresthesia on injection and incremental injection  Patient Tolerance:comfortable throughout block  Complications:no

## 2017-06-22 NOTE — ANESTHESIA PROCEDURE NOTES
Airway  Urgency: elective    Airway not difficult    General Information and Staff    Patient location during procedure: OR  Anesthesiologist: GIDEON PENA  CRNA: BG THORNE    Indications and Patient Condition  Indications for airway management: airway protection    Preoxygenated: yes  MILS maintained throughout  Mask difficulty assessment: 1 - vent by mask    Final Airway Details  Final airway type: endotracheal airway      Successful airway: ETT  Cuffed: yes   Successful intubation technique: direct laryngoscopy  Endotracheal tube insertion site: oral  Blade: Lucero  Blade size: #3  ETT size: 7.0 mm  Cormack-Lehane Classification: grade I - full view of glottis  Placement verified by: chest auscultation and capnometry   Cuff volume (mL): 6  Measured from: lips  ETT to lips (cm): 21  Number of attempts at approach: 1    Additional Comments  Atraumatic ET Tube placement.  Teeth as pre-op. BLEBS.  -ABD sounds.  +ET CO2.  Secured to face

## 2017-06-22 NOTE — PLAN OF CARE
Problem: Perioperative Period (Adult)  Goal: Signs and Symptoms of Listed Potential Problems Will be Absent or Manageable (Perioperative Period)  Outcome: Ongoing (interventions implemented as appropriate)    06/22/17 1238   Perioperative Period   Problems Assessed (Perioperative Period) all   Problems Present (Perioperative Period) none

## 2017-06-22 NOTE — PLAN OF CARE
Problem: Patient Care Overview (Adult)  Goal: Plan of Care Review  Outcome: Ongoing (interventions implemented as appropriate)    06/22/17 1379   Coping/Psychosocial Response Interventions   Plan Of Care Reviewed With patient   Patient Care Overview   Progress improving   Outcome Evaluation   Outcome Summary/Follow up Plan UP TO CHAIR, VOIDING, AMBULATING, ADEQUATE PO INTAKE, PAIN CONTROLLED, VSS- NO HTN NOTED       Goal: Adult Individualization and Mutuality  Outcome: Ongoing (interventions implemented as appropriate)  Goal: Discharge Needs Assessment  Outcome: Ongoing (interventions implemented as appropriate)    Problem: Self-Care Deficit (Adult,Obstetrics,Pediatric)  Goal: Improved Ability to Perform BADL and IADL  Outcome: Ongoing (interventions implemented as appropriate)    Problem: Fall Risk (Adult)  Goal: Absence of Falls  Outcome: Ongoing (interventions implemented as appropriate)    Problem: Pain, Acute (Adult)  Goal: Acceptable Pain Control/Comfort Level  Outcome: Ongoing (interventions implemented as appropriate)

## 2017-06-22 NOTE — ANESTHESIA POSTPROCEDURE EVALUATION
Patient: Renae Tidwell    Procedure Summary     Date Anesthesia Start Anesthesia Stop Room / Location    06/22/17 0902 1146  [facility]  /  [facility]       Procedure Diagnosis Surgeon Provider    RIGHT TOTAL SHOULDER REVERSE ARTHROPLASTY W/ AUGMENT  (Right Shoulder) No diagnosis on file. MD Richardson Mcconnell MD          Anesthesia Type: general, regional  Last vitals  /82 (06/22/17 1315)    Temp      Pulse 71 (06/22/17 1315)   Resp 18 (06/22/17 1315)    SpO2 95 % (06/22/17 1315)      Post Anesthesia Care and Evaluation    Patient location during evaluation: bedside  Patient participation: complete - patient participated  Level of consciousness: awake  Pain score: 1  Pain management: adequate  Airway patency: patent  Anesthetic complications: No anesthetic complications    Cardiovascular status: acceptable  Respiratory status: acceptable  Hydration status: acceptable

## 2017-06-22 NOTE — BRIEF OP NOTE
TOTAL SHOULDER REVERSE ARTHROPLASTY  Procedure Note    Renae Tidwell  6/22/2017    Pre-op Diagnosis:   Right shoulder rotator cuff arthropathy  Post-op Diagnosis:     same  Procedure/CPT® Codes:      Procedure(s):  RIGHT TOTAL SHOULDER REVERSE ARTHROPLASTY W/ AUGMENT     Surgeon(s):  Kofi Rivas MD    Anesthesia: General with Block    Staff:   Circulator: Soni Guerrero RN  Scrub Person: Mary Ellen Kelly  Vendor Representative: Jl Rivas  Assistant: FAZAL Leyva    Estimated Blood Loss: *No blood loss documented*  Urine Voided: * No values recorded between 6/22/2017  8:57 AM and 6/22/2017 11:34 AM *    Specimens:                * No specimens in log *      Drains:           Findings:     Complications: none      Kofi Rivas MD     Date: 6/22/2017  Time: 11:34 AM

## 2017-06-22 NOTE — PLAN OF CARE
Problem: Self-Care Deficit (Adult,Obstetrics,Pediatric)  Goal: Identify Related Risk Factors and Signs and Symptoms  Outcome: Outcome(s) achieved Date Met:  06/22/17 06/22/17 1411   Self-Care Deficit   Self-Care Deficit: Related Risk Factors surgery         Problem: Fall Risk (Adult)  Goal: Identify Related Risk Factors and Signs and Symptoms  Outcome: Outcome(s) achieved Date Met:  06/22/17 06/22/17 1411   Fall Risk   Fall Risk: Related Risk Factors gait/mobility problems         Problem: Pain, Acute (Adult)  Goal: Identify Related Risk Factors and Signs and Symptoms  Outcome: Outcome(s) achieved Date Met:  06/22/17 06/22/17 1411   Pain, Acute   Related Risk Factors (Acute Pain) surgery

## 2017-06-22 NOTE — NURSING NOTE
Dr. Recinos in , Patient time out performed right shoulder local infiltration of (Ropivicaine)  medication for pain administered. Patient tolerated well B/P  148/74 P 72 R 20  SPO2  96%

## 2017-06-22 NOTE — PERIOPERATIVE NURSING NOTE
rt radial pulse non palpable/pt state's pulse is not palpable there.  Rt ulnar pulse strongly palpable.

## 2017-06-22 NOTE — PLAN OF CARE
Problem: Patient Care Overview (Adult)  Goal: Discharge Needs Assessment  Outcome: Ongoing (interventions implemented as appropriate)    06/22/17 3792   Discharge Needs Assessment   Concerns To Be Addressed no discharge needs identified   Readmission Within The Last 30 Days no previous admission in last 30 days

## 2017-06-22 NOTE — ANESTHESIA PREPROCEDURE EVALUATION
Anesthesia Evaluation            Airway   Mallampati: II  no difficulty expected  Dental      Pulmonary    Cardiovascular     ECG reviewed  Rhythm: regular  Rate: normal        Neuro/Psych  (+) psychiatric history Depression,    GI/Hepatic/Renal/Endo      Musculoskeletal     Abdominal    Substance History      OB/GYN          Other                                        Anesthesia Plan    ASA 3     general and regional   (Right ISB)  intravenous induction   Anesthetic plan and risks discussed with patient.

## 2017-06-22 NOTE — THERAPY EVALUATION
Acute Care - Physical Therapy Initial Evaluation  [facility]     Patient Name: Renae Tidwell  : 1935  MRN: [id number]  Today's Date: 2017   Onset of Illness/Injury or Date of Surgery Date: 17  Date of Referral to PT: 17  Referring Physician: Rob      Admit Date: 2017     Visit Dx:    ICD-10-CM ICD-9-CM   1. 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   • Compression fracture 1970    At L4-L5   • DCIS (ductal carcinoma in situ) of breast     Of left breast post mastectomy   • Depression    • Esophageal ulceration    • GERD (gastroesophageal reflux disease)    • H/O seasonal allergies    • Hypertension    • Hypothyroidism    • MRSA (methicillin resistant Staphylococcus aureus) 2009    Of neck   • Osteoporosis    • Thoracic outlet syndrome      Past Surgical History:   Procedure Laterality Date   • BASAL CELL CARCINOMA EXCISION      Nose   • BONE RESECTION      Rib resections for thoracic outlet syndrome   • BREAST SURGERY       right mastectomy and needle biopsy; 2013 left mastectomy for DCIS   • CHOLECYSTECTOMY     • EYE SURGERY      Bilateral cataract removal   • HYSTERECTOMY      complete   • KNEE ARTHROSCOPY      Right knee   • MASTECTOMY Right     And needle biopsy   • NASAL SEPTUM SURGERY     • OOPHORECTOMY     • OTHER SURGICAL HISTORY  2008    Lumbar forminal and central stenosis requiring surgery   • PLANTAR'S WART EXCISION     • ROTATOR CUFF REPAIR      Right side   • TENDON REPAIR   05/2011    Of right foot   • TONSILLECTOMY  1952   • TOTAL KNEE ARTHROPLASTY Left 2004   • WRIST SURGERY  1970's    Four surgeries for ganglion cyst          PT ASSESSMENT (last 72 hours)      PT Evaluation       06/22/17 1604 06/22/17 1331    Rehab Evaluation    Document Type evaluation  -     Subjective Information agree to therapy  -     Patient Effort, Rehab Treatment good  -     General Information    Patient Profile Review yes  -     Onset of Illness/Injury or Date of Surgery Date 06/22/17  -     Referring Physician Rob  -     General Observations reclined in chair no acute distress  -     Pertinent History Of Current Problem POD 0 R rev TSA  -     Precautions/Limitations fall precautions;brace on when up;non-weight bearing status  -     Prior Level of Function independent:  -     Equipment Currently Used at Home  cane, straight  -    Benefits Reviewed patient:  -     Living Environment    Lives With  friend(s)  -    Living Arrangements  apartment  -    Home Accessibility  no concerns  -    Stair Railings at Home  outside, present on right side  -    Type of Financial/Environmental Concern  none  -    Transportation Available  car  -    Clinical Impression    Date of Referral to PT 06/22/17  -     Criteria for Skilled Therapeutic Interventions Met yes  -     Pathology/Pathophysiology Noted (Describe Specifically for Each System) musculoskeletal  -     Impairments Found (describe specific impairments) ROM;joint integrity and mobility;gait, locomotion, and balance  -     Functional Limitations in Following Categories (Describe Specific Limitations) self-care;home management  -     Rehab Potential fair, will monitor progress closely  -     Pain Assessment    Pain Assessment No/denies pain  -     Vision Assessment/Intervention    Visual Impairment WFL with corrective lenses  -     Cognitive Assessment/Intervention    Current Cognitive/Communication Assessment  functional  -     Orientation Status oriented x 4  -     Follows Commands/Answers Questions 100% of the time  -     ROM (Range of Motion)    General ROM no range of motion deficits identified  -     General ROM Detail limited ankle and post op R  shoulder  -     MMT (Manual Muscle Testing)    General MMT Assessment upper extremity strength deficits identified;lower extremity strength deficits identified  -     Mobility Assessment/Training    Extremity Weight-Bearing Status right upper extremity  -     Right Upper Extremity Weight-Bearing non weight-bearing  -     Bed Mobility, Assessment/Treatment    Bed Mob, Supine to Sit, [place] not tested  -     Bed Mob, Sit to Supine, [place] moderate assist (50% patient effort);2 person assist required;verbal cues required;nonverbal cues required (demo/gesture)  -     Transfer Assessment/Treatment    Transfers, Sit-Stand [place] minimum assist (75% patient effort);1 person + 1 person to manage equipment  -     Transfers, Stand-Sit [place] minimum assist (75% patient effort);1 person + 1 person to manage equipment  -LH     Transfer, Impairments ROM decreased;strength decreased;impaired balance  -     Gait Assessment/Treatment    Gait, [place] Level minimum assist (75% patient effort);1 person + 1 person to manage equipment  -     Gait, Assistive Device --   HHA LUE  -     Gait, Distance (Feet) 10  -     Gait, Safety Issues step length decreased;sequencing ability decreased;weight-shifting ability decreased  -     Gait, Comment chair to bathroom to bed  -     Therapy Exercises    Exercise Protocols shoulder surgery   educated on therex  -     Positioning and Restraints    Pre-Treatment Position sitting in chair/recliner  -     Post Treatment Position bed  -LH     In Bed supine;call light within reach;encouraged to call for assist;exit alarm on;with [facility]  -       User Key  (r) = Recorded By, (t) = Taken By, (c) =  Cosigned By    Initials Name Provider Type     Mary Ellen Dover, PT Physical Therapist    LEANN Benitez RN Registered Nurse          Physical Therapy Education     Title: PT OT SLP Therapies (Active)     Topic: Physical Therapy (Active)     Point: Mobility training (Active)    Learning Progress Summary    Learner Readiness Method Response Comment Documented by Status   Patient Acceptance E NR   06/22/17 1609 Active               Point: Home exercise program (Active)    Learning Progress Summary    Learner Readiness Method Response Comment Documented by Status   Patient Acceptance E NR   06/22/17 1609 Active                      User Key     Initials Effective Dates Name Provider Type Discipline     02/07/17 -  Mary Ellen Dover PT Physical Therapist PT                PT Recommendation and Plan  Anticipated Discharge Disposition: skilled nursing facility  Planned Therapy Interventions: balance training, bed mobility training, gait training, home exercise program, ROM (Range of Motion), strengthening, transfer training, stretching  PT Frequency: daily  Plan of Care Review  Plan Of Care Reviewed With: patient  Outcome Summary/Follow up Plan: pt presents w. generalized weakness post op rev TSA JAIE w. baseline impaired gait uses STC, fair tolerance to tsf chair to bed, educated on therex. pt reports plans SNU at [place]. may benefit from skilled PT acutely to address functional deficts and shoulder therex.           IP PT Goals       06/22/17 1610          Bed Mobility PT LTG    Bed Mobility PT LTG, Date Established 06/22/17  -      Bed Mobility PT LTG, Time to Achieve 3 days  -      Bed Mobility PT LTG, Activity Type all bed mobility  -      Bed Mobility PT LTG, [place] Level moderate assist (50% patient effort)  -      Transfer Training PT LTG    Transfer Training PT LTG, Date Established 06/22/17  -      Transfer Training PT LTG, Time to Achieve 3 days  -      Transfer Training PT LTG, Activity Type all  transfers  -      Transfer Training PT LTG, [place] Level minimum assist (75% patient effort)  -      Gait Training PT LTG    Gait Training Goal PT LTG, Date Established 06/22/17  -      Gait Training Goal PT LTG, Time to Achieve 3 days  -      Gait Training Goal PT LTG, [place] Level minimum assist (75% patient effort)  -      Gait Training Goal PT LTG, Assist Device cane, straight  -      Gait Training Goal PT LTG, Distance to Achieve 75  -      Patient Education PT LTG    Patient Education PT LTG, Date Established 06/22/17  -      Patient Education PT LTG, Time to Achieve 3 days  -      Patient Education PT LTG, Education Type written program  -        User Key  (r) = Recorded By, (t) = Taken By, (c) = Cosigned By    Initials Name Provider Type     Mary Ellen Dovre PT Physical Therapist                Outcome Measures       06/22/17 1600          How much help from another person do you currently need...    Turning from your back to your side while in flat bed without using bedrails? 2  -LH      Moving from lying on back to sitting on the side of a flat bed without bedrails? 2  -LH      Moving to and from a bed to a chair (including a wheelchair)? 2  -LH      Standing up from a chair using your arms (e.g., wheelchair, bedside chair)? 2  -LH      Climbing 3-5 steps with a railing? 1  -LH      To walk in hospital room? 1  -      AM-PAC 6 Clicks Score 10  -      Functional Assessment    Outcome Measure Options AM-PAC 6 Clicks Basic Mobility (PT)  -        User Key  (r) = Recorded By, (t) = Taken By, (c) = Cosigned By    Initials Name Provider Type     Mary Ellen Dover PT Physical Therapist           Time Calculation:         PT Charges       06/22/17 1612          Time Calculation    Start Time 1545  -      Stop Time 1601  -      Time Calculation (min) 16 min  -      PT Received On 06/22/17  -      PT - Next Appointment 06/23/17  -      PT Goal Re-Cert Due Date 06/25/17   -        User Key  (r) = Recorded By, (t) = Taken By, (c) = Cosigned By    Initials Name Provider Type     Mary Ellen Dover, PT Physical Therapist          Therapy Charges for Today     Code Description Service Date Service Provider Modifiers Qty    [id number]  PT EVAL MOD COMPLEXITY 2 6/22/2017 Mary Ellen Dover, PT GP 1          PT G-Codes  Outcome Measure Options: AM-PAC 6 Clicks Basic Mobility (PT)      Mary Ellen Dover PT  6/22/2017

## 2017-06-22 NOTE — OP NOTE
DATE OF PROCEDURE:  06/22/2017    PREOPERATIVE DIAGNOSIS:  Rotator cuff arthropathy, right shoulder.     POSTOPERATIVE DIAGNOSES: Rotator cuff arthropathy, right shoulder.     PROCEDURE PERFORMED: Right reverse total shoulder arthroplasty.     SURGEON: Kofi Rivas MD     ASSISTANT: Kell Caicedo PA-C     ANESTHESIA: General with interscalene block.     COMPLICATIONS: None.     IMPLANT: [employer] Equinox reverse total shoulder system.     ANTIBIOTIC: IV Kefzol given.     INDICATION FOR ASSISTANT: A surgical assistant, Kell Caicedo PA-C, was utilized as a medical necessity and was present there the entire procedure allowing for safe completion of the procedure and reducing overall operative time and morbidity for the patient.      DESCRIPTION OF PROCEDURE: The patient was taken to the operative suite. After adequate general interscalene block anesthesia was established, the right upper extremity was prepped and draped in the usual fashion with the patient in a beach chair position. An anterior incision was made through skin and subcutaneous tissues. Deltopectoral interval was entered. The conjoined tendon was reflected medially. The subscapularis was divided and tagged.  It was in poor condition. A large amount of effusion was evacuated and a large loose body from the bicipital tendon groove was identified and removed. The biceps tendon was tenotomized as it still had some partial-thickness attachments, but was in very poor condition. The retractors were placed to protect the posterior cuff and the inferior capsule was released off the humeral neck allowing for adequate external rotation. A 20° retroversion cut was made with a microsagittal saw and external cutting guide. I hand reamed up to a 15 stem.  The 17 reamer I felt might be a little tight. The 15 was just slightly loose on reaming.  I felt that I could probably bone graft proximally to get a good stable fit. I pulse lavaged and dried. I used  humeral head autograft. I cut up into matchsticks and some cancellous bone head. These were placed into the proximal aspect to allow for good firm fixation.  However, once I placed the 15 implant there was still some rotational looseness and I felt that the bone graft was not going to be sufficient and therefore I removed this.  I then chose to ream to a 17 mm and elected to cement proximally with a proximal cement mantle. I again pulse lavaged, dried, and prepared the canal.  Cement was placed proximally in the canal and then the 15 implant was placed and held until cement had fully hardened. I then exposed the glenoid side.  Capsular release was performed inferiorly protecting the axillary nerve. Labral and biceps tendon attachments were released and some of the superior cuff excised.  Once I had good visualization, I placed a central guide pin through a 38 mm baseplate guide. I reamed the glenoid. She had good hard bone in the glenoid area. There was a large amount of flattening though and some posterior wear and a large amount of osteophytic spur formation. The baseplate was kept a bit anterior as I felt this was the best bone quality as opposed to thinning of the posterior lip. She, in fact, had severe posterior erosion.  I Elected to use an 8 mm augment guide. I placed the guide pin at the 0 angle and then the  8° angle. I then reamed at the 8° angle to preserve bony anteriorly. I then placed a 0° pin  in the guide and then drilled my cage hole at the 0° angle. I then compressed an 8 mm posterior augment baseplate into position and held with 4 compression screws and locking caps. There was a crack in the central aspect of glenoid noted, but I was able to get screws posteriorly and anteriorly to this so I felt it would be stable. I then used a 38 glenosphere standard. I positioned this appropriately and held with a compression locking screws. I then trialed several components on the humeral side and chose a +10  humeral tray and a 0 mm polyethylene.  The +10 humeral tray was placed and held with a torque limiting screw and then the 0 polyethylene was placed and the shoulder reduced. She had good rotation, movement, and stability. Once this was complete, vigorous irrigation was carried out. I then closed the deltopectoral interval with 0 Vicryl, the subcutaneous tissue with 2-0 Vicryl, and the skin with staples. A sterile compression dressing and sling were applied. She was awoken and taken to the postanesthetic recovery unit in good condition.        Kofi Rivas M.D.*  EMELIA:lavonne  D:   06/22/2017 11:37:47  T:   06/22/2017 12:04:09  Job ID:   [id number]  Document ID:   [id number]  cc:

## 2017-06-22 NOTE — PLAN OF CARE
Problem: Patient Care Overview (Adult)  Goal: Plan of Care Review  Outcome: Ongoing (interventions implemented as appropriate)    06/22/17 0856   Coping/Psychosocial Response Interventions   Plan Of Care Reviewed With patient   Patient Care Overview   Progress progress toward functional goals as expected       Goal: Adult Individualization and Mutuality  Outcome: Ongoing (interventions implemented as appropriate)    06/22/17 0856   Individualization   Patient Specific Goals PAIN CONTROL

## 2017-06-22 NOTE — PLAN OF CARE
Problem: Perioperative Period (Adult)  Goal: Signs and Symptoms of Listed Potential Problems Will be Absent or Manageable (Perioperative Period)  Outcome: Ongoing (interventions implemented as appropriate)    06/22/17 0856   Perioperative Period   Problems Assessed (Perioperative Period) all   Problems Present (Perioperative Period) pain

## 2017-06-23 VITALS
OXYGEN SATURATION: 98 % | HEART RATE: 66 BPM | DIASTOLIC BLOOD PRESSURE: 72 MMHG | WEIGHT: 224 LBS | SYSTOLIC BLOOD PRESSURE: 120 MMHG | HEIGHT: 66 IN | BODY MASS INDEX: 36 KG/M2 | RESPIRATION RATE: 16 BRPM | TEMPERATURE: 97.1 F

## 2017-06-23 LAB
ABO + RH BLD: NORMAL
ANION GAP SERPL CALCULATED.3IONS-SCNC: 13 MMOL/L
BH BB BLOOD EXPIRATION DATE: NORMAL
BH BB BLOOD TYPE BARCODE: 5100
BH BB DISPENSE STATUS: NORMAL
BH BB PRODUCT CODE: NORMAL
BH BB UNIT NUMBER: NORMAL
BUN BLD-MCNC: 10 MG/DL (ref 8–23)
BUN/CREAT SERPL: 14.7 (ref 7–25)
CALCIUM SPEC-SCNC: 8.1 MG/DL (ref 8.6–10.5)
CHLORIDE SERPL-SCNC: 97 MMOL/L (ref 98–107)
CO2 SERPL-SCNC: 23 MMOL/L (ref 22–29)
CREAT BLD-MCNC: 0.68 MG/DL (ref 0.57–1)
CROSSMATCH INTERPRETATION: NORMAL
GFR SERPL CREATININE-BSD FRML MDRD: 83 ML/MIN/1.73
GLUCOSE BLD-MCNC: 123 MG/DL (ref 65–99)
HCT VFR BLD AUTO: 29.5 % (ref 35.6–45.5)
HGB BLD-MCNC: 9.7 G/DL (ref 11.9–15.5)
POTASSIUM BLD-SCNC: 3.9 MMOL/L (ref 3.5–5.2)
SODIUM BLD-SCNC: 133 MMOL/L (ref 136–145)
UNIT  ABO: NORMAL
UNIT  RH: NORMAL

## 2017-06-23 PROCEDURE — 85014 HEMATOCRIT: CPT | Performed by: ORTHOPAEDIC SURGERY

## 2017-06-23 PROCEDURE — 85018 HEMOGLOBIN: CPT | Performed by: ORTHOPAEDIC SURGERY

## 2017-06-23 PROCEDURE — 80048 BASIC METABOLIC PNL TOTAL CA: CPT | Performed by: ORTHOPAEDIC SURGERY

## 2017-06-23 PROCEDURE — 86901 BLOOD TYPING SEROLOGIC RH(D): CPT

## 2017-06-23 PROCEDURE — 86900 BLOOD TYPING SEROLOGIC ABO: CPT

## 2017-06-23 PROCEDURE — 25010000002 ENOXAPARIN PER 10 MG: Performed by: ORTHOPAEDIC SURGERY

## 2017-06-23 PROCEDURE — 94799 UNLISTED PULMONARY SVC/PX: CPT

## 2017-06-23 PROCEDURE — 25010000002 VANCOMYCIN: Performed by: ORTHOPAEDIC SURGERY

## 2017-06-23 RX ORDER — OXYCODONE HYDROCHLORIDE AND ACETAMINOPHEN 5; 325 MG/1; MG/1
2 TABLET ORAL EVERY 4 HOURS PRN
Qty: 40 TABLET | Refills: 0 | Status: SHIPPED | OUTPATIENT
Start: 2017-06-23 | End: 2018-01-18

## 2017-06-23 RX ADMIN — FLUTICASONE PROPIONATE 1 SPRAY: 50 SPRAY, METERED NASAL at 08:09

## 2017-06-23 RX ADMIN — ENOXAPARIN SODIUM 30 MG: 30 INJECTION SUBCUTANEOUS at 08:09

## 2017-06-23 RX ADMIN — VANCOMYCIN HYDROCHLORIDE 1500 MG: 1 INJECTION, POWDER, LYOPHILIZED, FOR SOLUTION INTRAVENOUS at 08:09

## 2017-06-23 RX ADMIN — ALBUTEROL SULFATE 2.5 MG: 2.5 SOLUTION RESPIRATORY (INHALATION) at 07:38

## 2017-06-23 RX ADMIN — PANTOPRAZOLE SODIUM 40 MG: 40 TABLET, DELAYED RELEASE ORAL at 08:09

## 2017-06-23 RX ADMIN — NAPROXEN 500 MG: 500 TABLET ORAL at 08:09

## 2017-06-23 RX ADMIN — CARVEDILOL 6.25 MG: 6.25 TABLET, FILM COATED ORAL at 08:09

## 2017-06-23 RX ADMIN — OXYCODONE HYDROCHLORIDE AND ACETAMINOPHEN 2 TABLET: 5; 325 TABLET ORAL at 06:15

## 2017-06-23 RX ADMIN — LEVOTHYROXINE SODIUM 88 MCG: 88 TABLET ORAL at 08:09

## 2017-06-23 RX ADMIN — ONDANSETRON 4 MG: 4 TABLET, FILM COATED ORAL at 06:15

## 2017-06-23 RX ADMIN — MISOPROSTOL 200 MCG: 200 TABLET ORAL at 08:10

## 2017-06-23 RX ADMIN — MUPIROCIN: 20 OINTMENT TOPICAL at 08:09

## 2017-06-23 RX ADMIN — MULTIPLE VITAMINS W/ MINERALS TAB 1 TABLET: TAB at 08:09

## 2017-06-23 RX ADMIN — SENNA AND DOCUSATE SODIUM 2 TABLET: 50; 8.6 TABLET, FILM COATED ORAL at 08:09

## 2017-06-23 RX ADMIN — OXYCODONE HYDROCHLORIDE AND ACETAMINOPHEN 2 TABLET: 5; 325 TABLET ORAL at 10:59

## 2017-06-23 NOTE — PLAN OF CARE
Problem: Patient Care Overview (Adult)  Goal: Plan of Care Review  Outcome: Ongoing (interventions implemented as appropriate)    06/23/17 9248   Outcome Evaluation   Outcome Summary/Follow up Plan asequate pain control, slight nausea subsided with zofran, up to bathroom, BSC. educated of self management of hypertension and monitoring blood pressure at home       Goal: Adult Individualization and Mutuality  Outcome: Ongoing (interventions implemented as appropriate)  Goal: Discharge Needs Assessment  Outcome: Ongoing (interventions implemented as appropriate)    Problem: Self-Care Deficit (Adult,Obstetrics,Pediatric)  Goal: Improved Ability to Perform BADL and IADL  Outcome: Ongoing (interventions implemented as appropriate)    Problem: Fall Risk (Adult)  Goal: Absence of Falls  Outcome: Ongoing (interventions implemented as appropriate)    Problem: Pain, Acute (Adult)  Goal: Acceptable Pain Control/Comfort Level  Outcome: Ongoing (interventions implemented as appropriate)

## 2017-06-23 NOTE — DISCHARGE SUMMARY
Total Shoulder Joint Replacement Discharge Instructions:    I. ACTIVITIES:  1. Exercises:  ? Complete exercise program as taught by the hospital physical therapist 2 times per day  ? Wear sling when in bed and when out of bed (except when doing exercises)  ? Exercise program will be advanced by the physical therapist  ? During the day be up ambulating every 2 hours (while awake) for short distances  ? Complete the ankle pump exercises at least 10 times per hour (while awake)  ? Elevate operative arm when in bed and for at least 30 minutes during the day.   ? Use cold packs 20-30 minutes approximately 5 times per day. This should be done before and after completing your exercises and at any time you are experiencing pain/ stiffness in your operative extremity.      2. Activities of Daily Living:  ? No tub baths, hot tubs, or swimming pools for  3 weeks  ? May shower and let water run over the incision on post-operative day #5 if no drainage. Do not scrub or rub the incision. Simply let the water run over the incision and pat dry.    II. Restrictions  ? No pushing, pulling, lifting, or weight bearing on operative extremity.  ? Continue to follow shoulder precautions as instructed by hospital physical therapist  ? Your surgeon will discuss with you when you will be able to drive again.  ? First week stay inside on even terrain. May go up and down stairs one stair at a time utilizing the hand rail  ? After one week, you may venture outside.    III. Precautions:  ? Everyone that comes near you should wash their hands  ? No elective dental, genital-urinary, or colon procedures or surgical procedures for 12 weeks after surgery unless absolutely necessary.  ?  If dental work or surgical procedure is deemed absolutely necessary, you will need to contact your surgeon as you will need to take antibiotics 1 hour prior to any dental work (including teeth cleanings).  ? Please discuss with your surgeon prophylactic antibiotics as  the length of time this intervention will be necessary for you varies with each patient’s health history and situation.  ? Avoid sick people. If you must be around someone who is ill, they should wear a mask.  ? Avoid visits to the Emergency Room or Urgent Care unless you are having a life threatening event.     IV. INCISION CARE:  ? Wash your hands prior to dressing changes  ? Change the dressing as needed to keep incision clean and dry. Utilize dry gauze and paper tape. Avoid touching the side of the gauze that goes against the incision with your hands.  ? No creams or ointments to the incision  ? May remove dressing once the incision is free of drainage  ? Do not touch or pick at the incision  ? Check incision every day and notify surgeon immediately if any of the following signs or symptoms are noted:  o Increase in redness  o Increase in swelling around the incision and of the entire extremity  o Increase in pain  o Drainage oozing from the incision  o Pulling apart of the edges of the incision  o Increase in overall body temperature (greater than 100.5 degrees)  ? Your surgeon will instruct you regarding suture or staple removal    V. Medications:     1. Stool Softeners: You will be at greater risk of constipation after surgery due to being less mobile and the pain medications.   ? Take stool softeners as instructed by your surgeon while on pain medications. Over the counter Colace 100 mg 1-2 capsules twice daily.   ? If stools become too loose or too frequent, please decreases the dosage or stop the stool softener.  ? If constipation occurs despite use of stool softeners, you are to continue the stool softeners and add a laxative (Milk of Magnesia 1 ounce daily as needed)  ? Drink plenty of fluids, and eat fruits and vegetables during your recovery time    2. Pain Medications utilized after surgery are narcotics and the law requires that the following information be given to all patients that are prescribed  narcotics:  ? CLASSIFICATION: Pain medications are called Opioids and are narcotics  ? LEGALITIES: It is illegal to share narcotics with others and to drive within 24 hours of taking narcotics  ? POTENTIAL SIDE EFFECTS: Potential side effects of opioids include: nausea, vomiting, itching, dizziness, drowsiness, dry mouth, constipation, and difficulty urinating.  ? POTENTIAL ADVERSE EFFECTS:   o Opioid tolerance can develop with use of pain medications and this simply means that it requires more and more of the medication to control pain; however, this is seen more in patients that use opioids for longer periods of time.  o Opioid dependence can develop with use of Opioids and this simply means that to stop the medication can cause withdrawal symptoms; however, this is seen with patients that use Opioids for longer periods of time.  o Opioid addiction can develop with use of Opioids and the incidence of this is very unlikely in patients who take the medications as ordered and stop the medications as instructed.  o Opioid overdose can be dangerous, but is unlikely when the medication is taken as ordered and stopped when ordered. It is important not to mix opioids with alcohol or with and type of sedative such as Benadryl as this can lead to over sedation and respiratory difficulty.  ? DOSAGE:   o Pain medications will need to be taken consistently for the first week to decrease pain and promote adequate pain relief and participation in physical therapy.  o After the initial surgical pain begins to resolve, you may begin to decrease the pain medication. By the end of 6 weeks, you should be off of pain medications.  o Refills will not be given by the office during evening hours, on weekends, or after 6 weeks post-op.  o To seek refills on pain medications during the initial 6 week post-operative period, you must call the office 48 hours in advance to request the refill. The office will then notify you when to   the prescription. DO NOT wait until you are out of the medication to request a refill.    V. FOLLOW-UP VISITS:  ? You will need to follow up in the office with your surgeon in  2 weeks. Please call this number ______  to schedule this appointment.  ? You will need to follow up with your primary care physician in 4 weeks.  ? If you have any concerns or suspected complications prior to your follow up visit, please call your surgeons office. Do not wait until your appointment time if you suspect complications. These will need to be addressed in the office promptly.

## 2017-06-23 NOTE — PLAN OF CARE
Problem: Inpatient Physical Therapy  Goal: Bed Mobility Goal LTG- PT  Outcome: Outcome(s) achieved Date Met:  06/23/17 06/23/17 1055   Bed Mobility PT LTG   Bed Mobility PT LTG, Outcome goal met       Goal: Transfer Training Goal 1 LTG- PT  Outcome: Outcome(s) achieved Date Met:  06/23/17 06/23/17 1055   Transfer Training PT LTG   Transfer Training PT LTG, Outcome goal met       Goal: Gait Training Goal LTG- PT  Outcome: Outcome(s) achieved Date Met:  06/23/17 06/23/17 1055   Gait Training PT LTG   Gait Training Goal PT LTG, Outcome goal met       Goal: Patient Education Goal LTG- PT  Outcome: Outcome(s) achieved Date Met:  06/23/17 06/23/17 1055   Patient Education PT LTG   Patient Education PT LTG Outcome goal met

## 2017-06-23 NOTE — PROGRESS NOTES
Orthopedic Progress Note    Subjective     Post-Operative Day: POD #1 s/p R RTSA.   Systemic or Specific Complaints: No Complaints. Effects of block have resolved. PO pain meds effective.     Objective     Vital signs in last 24 hours:  Temp:  [97.1 °F (36.2 °C)-98.7 °F (37.1 °C)] 97.1 °F (36.2 °C)  Heart Rate:  [60-95] 69  Resp:  [16-18] 16  BP: ()/(56-88) 120/72    General: alert, appears stated age and cooperative   Neurovascular: Radial nerve: Intact, Ulnar nerve: Intact and Median nerve: Intact  Radial pulse: 2+   Wound: Dressing clean and dry no evidence of infection.   Range of Motion: Not tested     Data Review  CBC:  Results from last 7 days  Lab Units 06/23/17  0526   HEMOGLOBIN g/dL 9.7*   HEMATOCRIT % 29.5*       Assessment/Plan     IMPRESSION:stable    Pain Relief: some relief    PLAN:D/C home today.    Activity: stay in sling     LOS: 1 day     Kofi Rivas MD    Date: 6/23/2017  Time: 7:39 AM

## 2017-06-23 NOTE — THERAPY DISCHARGE NOTE
Acute Care - Physical Therapy Treatment Note/Discharge  [facility]     Patient Name: Renae Tidwell  : 1935  MRN: [id number]  Today's Date: 2017  Onset of Illness/Injury or Date of Surgery Date: 17  Date of Referral to PT: 17  Referring Physician: Rob    Admit Date: 2017    Visit Dx:    ICD-10-CM ICD-9-CM   1. Impaired gait and mobility R26.89 781.2     Patient Active Problem List   Diagnosis   • Iron deficiency anemia   • Malignant neoplasm of female breast   • Rotator cuff insufficiency of right shoulder       Physical Therapy Education     Title: PT OT SLP Therapies (Resolved)     Topic: Physical Therapy (Resolved)     Point: Mobility training (Resolved)    Learning Progress Summary    Learner Readiness Method Response Comment Documented by Status   Patient Acceptance E NR   17 1609 Active               Point: Home exercise program (Resolved)    Learning Progress Summary    Learner Readiness Method Response Comment Documented by Status   Patient Acceptance E NR   17 1609 Active                      User Key     Initials Effective Dates Name Provider Type Discipline     17 -  Mary Ellen Dover, PT Physical Therapist PT                    IP PT Goals       17 1055 17 1610       Bed Mobility PT LTG    Bed Mobility PT LTG, Date Established  17  -     Bed Mobility PT LTG, Time to Achieve  3 days  -     Bed Mobility PT LTG, Activity Type  all bed mobility  -     Bed Mobility PT LTG, [place] Level  moderate assist (50% patient effort)  -     Bed Mobility PT LTG, Outcome goal met  -CW      Transfer Training PT LTG    Transfer Training PT LTG, Date Established  17  -     Transfer Training PT LTG, Time to Achieve  3 days  -     Transfer Training PT LTG, Activity Type  all transfers  -     Transfer Training PT LTG, [place] Level  minimum assist (75% patient effort)  -     Transfer Training PT LTG, Outcome goal met  -CW       Gait Training PT LTG    Gait Training Goal PT LTG, Date Established  06/22/17  -     Gait Training Goal PT LTG, Time to Achieve  3 days  -     Gait Training Goal PT LTG, [place] Level  minimum assist (75% patient effort)  -     Gait Training Goal PT LTG, Assist Device  cane, straight  -     Gait Training Goal PT LTG, Distance to Achieve  75  -LH     Gait Training Goal PT LTG, Outcome goal met  -CW      Patient Education PT LTG    Patient Education PT LTG, Date Established  06/22/17  -     Patient Education PT LTG, Time to Achieve  3 days  -     Patient Education PT LTG, Education Type  written program  -     Patient Education PT LTG Outcome goal met  -        User Key  (r) = Recorded By, (t) = Taken By, (c) = Cosigned By    Initials Name Provider Type     Mary Ellen Dover, PT Physical Therapist    PABLO Pastor Physical Therapy Assistant            PT Recommendation and Plan  Anticipated Discharge Disposition: skilled nursing facility  Planned Therapy Interventions: balance training, bed mobility training, gait training, home exercise program, ROM (Range of Motion), strengthening, transfer training, stretching  PT Frequency: daily             Outcome Measures       06/22/17 1600          How much help from another person do you currently need...    Turning from your back to your side while in flat bed without using bedrails? 2  -LH      Moving from lying on back to sitting on the side of a flat bed without bedrails? 2  -LH      Moving to and from a bed to a chair (including a wheelchair)? 2  -LH      Standing up from a chair using your arms (e.g., wheelchair, bedside chair)? 2  -LH      Climbing 3-5 steps with a railing? 1  -LH      To walk in hospital room? 1  -      AM-PAC 6 Clicks Score 10  -      Functional Assessment    Outcome Measure Options AM-PAC 6 Clicks Basic Mobility (PT)  -        User Key  (r) = Recorded By, (t) = Taken By, (c) = Cosigned By    Initials Name Provider  Type    LH Mary Ellen Dover, PT Physical Therapist           Time Calculation:           PT G-Codes  Outcome Measure Options: AM-PAC 6 Clicks Basic Mobility (PT)    PT Discharge Summary  Reason for Discharge: All goals achieved  Outcomes Achieved: Refer to plan of care for updates on goals achieved  Discharge Destination: Home with home health    Merrill Pastor  6/23/2017

## 2017-06-23 NOTE — PLAN OF CARE
Problem: Patient Care Overview (Adult)  Goal: Plan of Care Review  Outcome: Ongoing (interventions implemented as appropriate)    06/22/17 1649 06/23/17 0809 06/23/17 0950   Coping/Psychosocial Response Interventions   Plan Of Care Reviewed With --  patient --    Patient Care Overview   Progress improving --  --    Outcome Evaluation   Outcome Summary/Follow up Plan --  --  pt is ambulating with minimal assist x1. sling in place. pain is controlled with PO pain medication. pt to be d/c'd home today to rehab. dressing changed, c/d/i. educated pt on impotrance of self management of blood pressure and check at home.        Goal: Adult Individualization and Mutuality  Outcome: Ongoing (interventions implemented as appropriate)  Goal: Discharge Needs Assessment  Outcome: Ongoing (interventions implemented as appropriate)    Problem: Perioperative Period (Adult)  Goal: Signs and Symptoms of Listed Potential Problems Will be Absent or Manageable (Perioperative Period)  Outcome: Ongoing (interventions implemented as appropriate)    Problem: Self-Care Deficit (Adult,Obstetrics,Pediatric)  Goal: Identify Related Risk Factors and Signs and Symptoms  Outcome: Ongoing (interventions implemented as appropriate)  Goal: Improved Ability to Perform BADL and IADL  Outcome: Ongoing (interventions implemented as appropriate)    Problem: Fall Risk (Adult)  Goal: Identify Related Risk Factors and Signs and Symptoms  Outcome: Ongoing (interventions implemented as appropriate)  Goal: Absence of Falls  Outcome: Ongoing (interventions implemented as appropriate)    Problem: Pain, Acute (Adult)  Goal: Identify Related Risk Factors and Signs and Symptoms  Outcome: Ongoing (interventions implemented as appropriate)  Goal: Acceptable Pain Control/Comfort Level  Outcome: Ongoing (interventions implemented as appropriate)

## 2018-01-12 ENCOUNTER — APPOINTMENT (OUTPATIENT)
Dept: PREADMISSION TESTING | Facility: HOSPITAL | Age: 83
End: 2018-01-12

## 2018-01-18 ENCOUNTER — APPOINTMENT (OUTPATIENT)
Dept: PREADMISSION TESTING | Facility: HOSPITAL | Age: 83
End: 2018-01-18

## 2018-01-18 VITALS
RESPIRATION RATE: 20 BRPM | BODY MASS INDEX: 35.68 KG/M2 | DIASTOLIC BLOOD PRESSURE: 60 MMHG | TEMPERATURE: 97.2 F | OXYGEN SATURATION: 96 % | HEART RATE: 68 BPM | HEIGHT: 66 IN | WEIGHT: 222 LBS | SYSTOLIC BLOOD PRESSURE: 114 MMHG

## 2018-01-18 PROCEDURE — 93005 ELECTROCARDIOGRAM TRACING: CPT

## 2018-01-18 PROCEDURE — 93010 ELECTROCARDIOGRAM REPORT: CPT | Performed by: INTERNAL MEDICINE

## 2018-01-18 RX ORDER — HYDROCODONE BITARTRATE AND ACETAMINOPHEN 7.5; 325 MG/1; MG/1
1 TABLET ORAL EVERY 6 HOURS PRN
COMMUNITY
End: 2018-01-22 | Stop reason: HOSPADM

## 2018-01-18 RX ORDER — TOLTERODINE 4 MG/1
4 CAPSULE, EXTENDED RELEASE ORAL DAILY PRN
Status: ON HOLD | COMMUNITY
End: 2018-02-23

## 2018-01-18 RX ORDER — RANITIDINE 300 MG/1
300 TABLET ORAL NIGHTLY
COMMUNITY
End: 2018-02-28 | Stop reason: HOSPADM

## 2018-01-18 NOTE — DISCHARGE INSTRUCTIONS
Take the following medications the morning of surgery with a small sip of water:  CARVEDILOL      General Instructions:  • Do not eat solid food after midnight the night before surgery.  • You may drink clear liquids day of surgery but must stop at least one hour before your hospital arrival time.  • It is beneficial for you to have a clear drink that contains carbohydrates the day of surgery.  We suggest a 12 to 20 ounce bottle of Gatorade or Powerade for non-diabetic patients or a 12 to 20 ounce bottle of G2 or Powerade Zero for diabetic patients. (Pediatric patients, are not advised to drink a 12 to 20 ounce carbohydrate drink)    Clear liquids are liquids you can see through.  Nothing red in color.     Plain water                               Sports drinks  Sodas                                   Gelatin (Jell-O)  Fruit juices without pulp such as white grape juice and apple juice  Popsicles that contain no fruit or yogurt  Tea or coffee (no cream or milk added)  Gatorade / Powerade  G2 / Powerade Zero    • Infants may have breast milk up to four hours before surgery.  • Infants drinking formula may drink formula up to six hours before surgery.   • Patients who avoid smoking, chewing tobacco and alcohol for 4 weeks prior to surgery have a reduced risk of post-operative complications.  Quit smoking as many days before surgery as you can.  • Do not smoke, use chewing tobacco or drink alcohol the day of surgery.   • If applicable bring your C-PAP/ BI-PAP machine.  • Bring any papers given to you in the doctor’s office.  • Wear clean comfortable clothes and socks.  • Do not wear contact lenses or make-up.  Bring a case for your glasses.   • Bring crutches or walker if applicable.  • Remove all piercings.  Leave jewelry and any other valuables at home.  • Hair extensions with metal clips must be removed prior to surgery.  • The Pre-Admission Testing nurse will instruct you to bring medications if unable to obtain an  accurate list in Pre-Admission Testing.        If you were given a blood bank ID arm band remember to bring it with you the day of surgery.    Preventing a Surgical Site Infection:  • For 2 to 3 days before surgery, avoid shaving with a razor because the razor can irritate skin and make it easier to develop an infection.  • The night prior to surgery sleep in a clean bed with clean clothing.  Do not allow pets to sleep with you.  • Shower on the morning of surgery using a fresh bar of anti-bacterial soap (such as Dial) and clean washcloth.  Dry with a clean towel and dress in clean clothing.  • Ask your surgeon if you will be receiving antibiotics prior to surgery.  • Make sure you, your family, and all healthcare providers clean their hands with soap and water or an alcohol based hand  before caring for you or your wound.    Day of surgery:1/19/2018 ARRIVAL TIME 10:00 AM  Upon arrival, a Pre-op nurse and Anesthesiologist will review your health history, obtain vital signs, and answer questions you may have.  The only belongings needed at this time will be your home medications and if applicable your C-PAP/BI-PAP machine.  If you are staying overnight your family can leave the rest of your belongings in the car and bring them to your room later.  A Pre-op nurse will start an IV and you may receive medication in preparation for surgery, including something to help you relax.  Your family will be able to see you in the Pre-op area.  While you are in surgery your family should notify the waiting room  if they leave the waiting room area and provide a contact phone number.    Please be aware that surgery does come with discomfort.  We want to make every effort to control your discomfort so please discuss any uncontrolled symptoms with your nurse.   Your doctor will most likely have prescribed pain medications.      If you are going home after surgery you will receive individualized written care  instructions before being discharged.  A responsible adult must drive you to and from the hospital on the day of your surgery and stay with you for 24 hours.    If you are staying overnight following surgery, you will be transported to your hospital room following the recovery period.  [facility] has all private rooms.    If you have any questions please call Pre-Admission Testing at [phone].  Deductibles and co-payments are collected on the day of service. Please be prepared to pay the required co-pay, deductible or deposit on the day of service as defined by your plan.

## 2018-01-19 ENCOUNTER — APPOINTMENT (OUTPATIENT)
Dept: GENERAL RADIOLOGY | Facility: HOSPITAL | Age: 83
End: 2018-01-19

## 2018-01-19 ENCOUNTER — HOSPITAL ENCOUNTER (INPATIENT)
Facility: HOSPITAL | Age: 83
LOS: 3 days | Discharge: NURSING, REHAB OR LONG-TERM CARE | End: 2018-01-22
Attending: ORTHOPAEDIC SURGERY | Admitting: ORTHOPAEDIC SURGERY

## 2018-01-19 ENCOUNTER — ANESTHESIA EVENT (OUTPATIENT)
Dept: PERIOP | Facility: HOSPITAL | Age: 83
End: 2018-01-19

## 2018-01-19 ENCOUNTER — ANESTHESIA (OUTPATIENT)
Dept: PERIOP | Facility: HOSPITAL | Age: 83
End: 2018-01-19

## 2018-01-19 DIAGNOSIS — Z74.09 DECREASED MOBILITY AND ENDURANCE: Primary | ICD-10-CM

## 2018-01-19 PROBLEM — M19.079 ANKLE ARTHRITIS: Status: ACTIVE | Noted: 2018-01-19

## 2018-01-19 PROCEDURE — C1713 ANCHOR/SCREW BN/BN,TIS/BN: HCPCS | Performed by: ORTHOPAEDIC SURGERY

## 2018-01-19 PROCEDURE — 25010000002 MIDAZOLAM PER 1 MG: Performed by: ANESTHESIOLOGY

## 2018-01-19 PROCEDURE — 25010000002 SUCCINYLCHOLINE PER 20 MG: Performed by: ANESTHESIOLOGY

## 2018-01-19 PROCEDURE — 0L8N3ZZ DIVISION OF RIGHT LOWER LEG TENDON, PERCUTANEOUS APPROACH: ICD-10-PCS | Performed by: ORTHOPAEDIC SURGERY

## 2018-01-19 PROCEDURE — 73610 X-RAY EXAM OF ANKLE: CPT

## 2018-01-19 PROCEDURE — 25010000002 VANCOMYCIN PER 500 MG: Performed by: ORTHOPAEDIC SURGERY

## 2018-01-19 PROCEDURE — 0SGF04Z FUSION OF RIGHT ANKLE JOINT WITH INTERNAL FIXATION DEVICE, OPEN APPROACH: ICD-10-PCS | Performed by: ORTHOPAEDIC SURGERY

## 2018-01-19 PROCEDURE — 25010000002 DEXAMETHASONE PER 1 MG: Performed by: NURSE ANESTHETIST, CERTIFIED REGISTERED

## 2018-01-19 PROCEDURE — 0SGF07Z FUSION OF RIGHT ANKLE JOINT WITH AUTOLOGOUS TISSUE SUBSTITUTE, OPEN APPROACH: ICD-10-PCS | Performed by: ORTHOPAEDIC SURGERY

## 2018-01-19 PROCEDURE — 25010000002 HYDROMORPHONE PER 4 MG: Performed by: NURSE ANESTHETIST, CERTIFIED REGISTERED

## 2018-01-19 PROCEDURE — 0QBL0ZZ EXCISION OF RIGHT TARSAL, OPEN APPROACH: ICD-10-PCS | Performed by: ORTHOPAEDIC SURGERY

## 2018-01-19 PROCEDURE — 25010000002 ONDANSETRON PER 1 MG: Performed by: NURSE ANESTHETIST, CERTIFIED REGISTERED

## 2018-01-19 PROCEDURE — 25010000002 NALOXONE PER 1 MG: Performed by: ANESTHESIOLOGY

## 2018-01-19 PROCEDURE — 25010000003 CEFAZOLIN IN DEXTROSE 2-4 GM/100ML-% SOLUTION: Performed by: ORTHOPAEDIC SURGERY

## 2018-01-19 PROCEDURE — 25010000002 PROPOFOL 10 MG/ML EMULSION: Performed by: NURSE ANESTHETIST, CERTIFIED REGISTERED

## 2018-01-19 PROCEDURE — 76000 FLUOROSCOPY <1 HR PHYS/QHP: CPT

## 2018-01-19 PROCEDURE — 25010000002 FENTANYL CITRATE (PF) 100 MCG/2ML SOLUTION: Performed by: ANESTHESIOLOGY

## 2018-01-19 DEVICE — IMPLANTABLE DEVICE: Type: IMPLANTABLE DEVICE | Site: ANKLE | Status: FUNCTIONAL

## 2018-01-19 DEVICE — SCRW CORT 5X30MM: Type: IMPLANTABLE DEVICE | Site: ANKLE | Status: FUNCTIONAL

## 2018-01-19 DEVICE — PUTTY DBM GRAFTON 6CC: Type: IMPLANTABLE DEVICE | Site: ANKLE | Status: FUNCTIONAL

## 2018-01-19 DEVICE — BONE GRAFT KIT
Type: IMPLANTABLE DEVICE | Site: ANKLE | Status: FUNCTIONAL
Brand: AUGMENT BONE GRAFT

## 2018-01-19 DEVICE — SCRW DYNANAIL NOHD PA 5X70MM: Type: IMPLANTABLE DEVICE | Site: ANKLE | Status: FUNCTIONAL

## 2018-01-19 DEVICE — SCRW CORT DYNANAIL HD 5X25MM: Type: IMPLANTABLE DEVICE | Site: ANKLE | Status: FUNCTIONAL

## 2018-01-19 RX ORDER — HYDROMORPHONE HCL 110MG/55ML
0.5 PATIENT CONTROLLED ANALGESIA SYRINGE INTRAVENOUS
Status: DISCONTINUED | OUTPATIENT
Start: 2018-01-19 | End: 2018-01-19 | Stop reason: HOSPADM

## 2018-01-19 RX ORDER — PROMETHAZINE HYDROCHLORIDE 25 MG/1
25 TABLET ORAL ONCE AS NEEDED
Status: DISCONTINUED | OUTPATIENT
Start: 2018-01-19 | End: 2018-01-19 | Stop reason: HOSPADM

## 2018-01-19 RX ORDER — SENNA AND DOCUSATE SODIUM 50; 8.6 MG/1; MG/1
2 TABLET, FILM COATED ORAL NIGHTLY
Status: DISCONTINUED | OUTPATIENT
Start: 2018-01-19 | End: 2018-01-22 | Stop reason: HOSPADM

## 2018-01-19 RX ORDER — NALOXONE HYDROCHLORIDE 0.4 MG/ML
INJECTION, SOLUTION INTRAMUSCULAR; INTRAVENOUS; SUBCUTANEOUS AS NEEDED
Status: DISCONTINUED | OUTPATIENT
Start: 2018-01-19 | End: 2018-01-19 | Stop reason: SURG

## 2018-01-19 RX ORDER — ONDANSETRON 2 MG/ML
INJECTION INTRAMUSCULAR; INTRAVENOUS AS NEEDED
Status: DISCONTINUED | OUTPATIENT
Start: 2018-01-19 | End: 2018-01-19 | Stop reason: SURG

## 2018-01-19 RX ORDER — MIDAZOLAM HYDROCHLORIDE 1 MG/ML
2 INJECTION INTRAMUSCULAR; INTRAVENOUS
Status: DISCONTINUED | OUTPATIENT
Start: 2018-01-19 | End: 2018-01-19 | Stop reason: HOSPADM

## 2018-01-19 RX ORDER — SODIUM CHLORIDE 0.9 % (FLUSH) 0.9 %
1-10 SYRINGE (ML) INJECTION AS NEEDED
Status: DISCONTINUED | OUTPATIENT
Start: 2018-01-19 | End: 2018-01-22 | Stop reason: HOSPADM

## 2018-01-19 RX ORDER — SERTRALINE HYDROCHLORIDE 100 MG/1
100 TABLET, FILM COATED ORAL EVERY EVENING
Status: DISCONTINUED | OUTPATIENT
Start: 2018-01-19 | End: 2018-01-22 | Stop reason: HOSPADM

## 2018-01-19 RX ORDER — FAMOTIDINE 20 MG/1
20 TABLET, FILM COATED ORAL 2 TIMES DAILY
Status: DISCONTINUED | OUTPATIENT
Start: 2018-01-19 | End: 2018-01-22 | Stop reason: HOSPADM

## 2018-01-19 RX ORDER — SUCCINYLCHOLINE CHLORIDE 20 MG/ML
INJECTION INTRAMUSCULAR; INTRAVENOUS AS NEEDED
Status: DISCONTINUED | OUTPATIENT
Start: 2018-01-19 | End: 2018-01-19 | Stop reason: SURG

## 2018-01-19 RX ORDER — PROMETHAZINE HYDROCHLORIDE 25 MG/ML
12.5 INJECTION, SOLUTION INTRAMUSCULAR; INTRAVENOUS ONCE AS NEEDED
Status: DISCONTINUED | OUTPATIENT
Start: 2018-01-19 | End: 2018-01-19 | Stop reason: HOSPADM

## 2018-01-19 RX ORDER — NALOXONE HCL 0.4 MG/ML
0.4 VIAL (ML) INJECTION
Status: DISCONTINUED | OUTPATIENT
Start: 2018-01-19 | End: 2018-01-22 | Stop reason: HOSPADM

## 2018-01-19 RX ORDER — CARVEDILOL 6.25 MG/1
6.25 TABLET ORAL 2 TIMES DAILY WITH MEALS
Status: DISCONTINUED | OUTPATIENT
Start: 2018-01-19 | End: 2018-01-22 | Stop reason: HOSPADM

## 2018-01-19 RX ORDER — FENTANYL CITRATE 50 UG/ML
50 INJECTION, SOLUTION INTRAMUSCULAR; INTRAVENOUS
Status: DISCONTINUED | OUTPATIENT
Start: 2018-01-19 | End: 2018-01-19 | Stop reason: HOSPADM

## 2018-01-19 RX ORDER — NALOXONE HCL 0.4 MG/ML
0.2 VIAL (ML) INJECTION AS NEEDED
Status: DISCONTINUED | OUTPATIENT
Start: 2018-01-19 | End: 2018-01-19 | Stop reason: HOSPADM

## 2018-01-19 RX ORDER — DIPHENHYDRAMINE HYDROCHLORIDE 50 MG/ML
12.5 INJECTION INTRAMUSCULAR; INTRAVENOUS
Status: DISCONTINUED | OUTPATIENT
Start: 2018-01-19 | End: 2018-01-19 | Stop reason: HOSPADM

## 2018-01-19 RX ORDER — OXYBUTYNIN CHLORIDE 5 MG/1
5 TABLET, EXTENDED RELEASE ORAL DAILY
Status: DISCONTINUED | OUTPATIENT
Start: 2018-01-19 | End: 2018-01-22 | Stop reason: HOSPADM

## 2018-01-19 RX ORDER — MAGNESIUM HYDROXIDE 1200 MG/15ML
LIQUID ORAL AS NEEDED
Status: DISCONTINUED | OUTPATIENT
Start: 2018-01-19 | End: 2018-01-19 | Stop reason: HOSPADM

## 2018-01-19 RX ORDER — LEVOTHYROXINE SODIUM 88 UG/1
88 TABLET ORAL DAILY
Status: DISCONTINUED | OUTPATIENT
Start: 2018-01-19 | End: 2018-01-22 | Stop reason: HOSPADM

## 2018-01-19 RX ORDER — MIDAZOLAM HYDROCHLORIDE 1 MG/ML
1 INJECTION INTRAMUSCULAR; INTRAVENOUS
Status: DISCONTINUED | OUTPATIENT
Start: 2018-01-19 | End: 2018-01-19 | Stop reason: HOSPADM

## 2018-01-19 RX ORDER — SODIUM CHLORIDE 0.9 % (FLUSH) 0.9 %
1-10 SYRINGE (ML) INJECTION AS NEEDED
Status: DISCONTINUED | OUTPATIENT
Start: 2018-01-19 | End: 2018-01-19 | Stop reason: HOSPADM

## 2018-01-19 RX ORDER — HYDROMORPHONE HCL 110MG/55ML
PATIENT CONTROLLED ANALGESIA SYRINGE INTRAVENOUS AS NEEDED
Status: DISCONTINUED | OUTPATIENT
Start: 2018-01-19 | End: 2018-01-19 | Stop reason: SURG

## 2018-01-19 RX ORDER — CEFAZOLIN SODIUM 2 G/100ML
2 INJECTION, SOLUTION INTRAVENOUS EVERY 8 HOURS
Status: COMPLETED | OUTPATIENT
Start: 2018-01-20 | End: 2018-01-20

## 2018-01-19 RX ORDER — EPHEDRINE SULFATE 50 MG/ML
INJECTION, SOLUTION INTRAVENOUS AS NEEDED
Status: DISCONTINUED | OUTPATIENT
Start: 2018-01-19 | End: 2018-01-19 | Stop reason: SURG

## 2018-01-19 RX ORDER — OXYCODONE AND ACETAMINOPHEN 10; 325 MG/1; MG/1
2 TABLET ORAL EVERY 4 HOURS PRN
Status: DISCONTINUED | OUTPATIENT
Start: 2018-01-29 | End: 2018-01-20

## 2018-01-19 RX ORDER — SODIUM CHLORIDE, SODIUM LACTATE, POTASSIUM CHLORIDE, CALCIUM CHLORIDE 600; 310; 30; 20 MG/100ML; MG/100ML; MG/100ML; MG/100ML
9 INJECTION, SOLUTION INTRAVENOUS CONTINUOUS
Status: DISCONTINUED | OUTPATIENT
Start: 2018-01-19 | End: 2018-01-22 | Stop reason: HOSPADM

## 2018-01-19 RX ORDER — VALSARTAN 160 MG/1
160 TABLET ORAL EVERY EVENING
Status: DISCONTINUED | OUTPATIENT
Start: 2018-01-19 | End: 2018-01-22 | Stop reason: HOSPADM

## 2018-01-19 RX ORDER — ONDANSETRON 2 MG/ML
4 INJECTION INTRAMUSCULAR; INTRAVENOUS EVERY 6 HOURS PRN
Status: DISCONTINUED | OUTPATIENT
Start: 2018-01-19 | End: 2018-01-22 | Stop reason: HOSPADM

## 2018-01-19 RX ORDER — EPHEDRINE SULFATE 50 MG/ML
5 INJECTION, SOLUTION INTRAVENOUS ONCE AS NEEDED
Status: DISCONTINUED | OUTPATIENT
Start: 2018-01-19 | End: 2018-01-19 | Stop reason: HOSPADM

## 2018-01-19 RX ORDER — ASPIRIN 325 MG
325 TABLET, DELAYED RELEASE (ENTERIC COATED) ORAL DAILY
Status: DISCONTINUED | OUTPATIENT
Start: 2018-01-20 | End: 2018-01-22 | Stop reason: HOSPADM

## 2018-01-19 RX ORDER — FLUMAZENIL 0.1 MG/ML
0.2 INJECTION INTRAVENOUS AS NEEDED
Status: DISCONTINUED | OUTPATIENT
Start: 2018-01-19 | End: 2018-01-19 | Stop reason: HOSPADM

## 2018-01-19 RX ORDER — MISOPROSTOL 200 UG/1
200 TABLET ORAL DAILY
Status: DISCONTINUED | OUTPATIENT
Start: 2018-01-19 | End: 2018-01-22 | Stop reason: HOSPADM

## 2018-01-19 RX ORDER — ZOLPIDEM TARTRATE 5 MG/1
10 TABLET ORAL NIGHTLY PRN
Status: DISCONTINUED | OUTPATIENT
Start: 2018-01-19 | End: 2018-01-22 | Stop reason: HOSPADM

## 2018-01-19 RX ORDER — LIDOCAINE HYDROCHLORIDE 10 MG/ML
0.5 INJECTION, SOLUTION EPIDURAL; INFILTRATION; INTRACAUDAL; PERINEURAL ONCE AS NEEDED
Status: DISCONTINUED | OUTPATIENT
Start: 2018-01-19 | End: 2018-01-19 | Stop reason: HOSPADM

## 2018-01-19 RX ORDER — FAMOTIDINE 10 MG/ML
20 INJECTION, SOLUTION INTRAVENOUS ONCE
Status: COMPLETED | OUTPATIENT
Start: 2018-01-19 | End: 2018-01-19

## 2018-01-19 RX ORDER — ALBUTEROL SULFATE 2.5 MG/3ML
2.5 SOLUTION RESPIRATORY (INHALATION) EVERY 4 HOURS PRN
Status: DISCONTINUED | OUTPATIENT
Start: 2018-01-19 | End: 2018-01-22 | Stop reason: HOSPADM

## 2018-01-19 RX ORDER — PANTOPRAZOLE SODIUM 40 MG/1
40 TABLET, DELAYED RELEASE ORAL EVERY MORNING
Status: DISCONTINUED | OUTPATIENT
Start: 2018-01-20 | End: 2018-01-22 | Stop reason: HOSPADM

## 2018-01-19 RX ORDER — LABETALOL HYDROCHLORIDE 5 MG/ML
5 INJECTION, SOLUTION INTRAVENOUS
Status: DISCONTINUED | OUTPATIENT
Start: 2018-01-19 | End: 2018-01-19 | Stop reason: HOSPADM

## 2018-01-19 RX ORDER — PROPOFOL 10 MG/ML
VIAL (ML) INTRAVENOUS AS NEEDED
Status: DISCONTINUED | OUTPATIENT
Start: 2018-01-19 | End: 2018-01-19 | Stop reason: SURG

## 2018-01-19 RX ORDER — ONDANSETRON 2 MG/ML
4 INJECTION INTRAMUSCULAR; INTRAVENOUS ONCE AS NEEDED
Status: DISCONTINUED | OUTPATIENT
Start: 2018-01-19 | End: 2018-01-19 | Stop reason: HOSPADM

## 2018-01-19 RX ORDER — FLUTICASONE PROPIONATE 50 MCG
2 SPRAY, SUSPENSION (ML) NASAL DAILY
Status: DISCONTINUED | OUTPATIENT
Start: 2018-01-19 | End: 2018-01-22 | Stop reason: HOSPADM

## 2018-01-19 RX ORDER — LIDOCAINE HYDROCHLORIDE 20 MG/ML
INJECTION, SOLUTION INFILTRATION; PERINEURAL AS NEEDED
Status: DISCONTINUED | OUTPATIENT
Start: 2018-01-19 | End: 2018-01-19 | Stop reason: SURG

## 2018-01-19 RX ORDER — POTASSIUM CHLORIDE 750 MG/1
10 CAPSULE, EXTENDED RELEASE ORAL DAILY
Status: DISCONTINUED | OUTPATIENT
Start: 2018-01-19 | End: 2018-01-22 | Stop reason: HOSPADM

## 2018-01-19 RX ORDER — OXYCODONE AND ACETAMINOPHEN 10; 325 MG/1; MG/1
1 TABLET ORAL EVERY 4 HOURS PRN
Status: DISCONTINUED | OUTPATIENT
Start: 2018-01-19 | End: 2018-01-20

## 2018-01-19 RX ORDER — HYDROMORPHONE HYDROCHLORIDE 1 MG/ML
0.5 INJECTION, SOLUTION INTRAMUSCULAR; INTRAVENOUS; SUBCUTANEOUS EVERY 4 HOURS PRN
Status: DISCONTINUED | OUTPATIENT
Start: 2018-01-19 | End: 2018-01-22 | Stop reason: HOSPADM

## 2018-01-19 RX ORDER — HYDRALAZINE HYDROCHLORIDE 20 MG/ML
5 INJECTION INTRAMUSCULAR; INTRAVENOUS
Status: DISCONTINUED | OUTPATIENT
Start: 2018-01-19 | End: 2018-01-19 | Stop reason: HOSPADM

## 2018-01-19 RX ORDER — DEXAMETHASONE SODIUM PHOSPHATE 10 MG/ML
INJECTION INTRAMUSCULAR; INTRAVENOUS AS NEEDED
Status: DISCONTINUED | OUTPATIENT
Start: 2018-01-19 | End: 2018-01-19 | Stop reason: SURG

## 2018-01-19 RX ORDER — CEFAZOLIN SODIUM 2 G/100ML
2 INJECTION, SOLUTION INTRAVENOUS ONCE
Status: COMPLETED | OUTPATIENT
Start: 2018-01-19 | End: 2018-01-19

## 2018-01-19 RX ORDER — FUROSEMIDE 20 MG/1
20 TABLET ORAL WEEKLY
Status: DISCONTINUED | OUTPATIENT
Start: 2018-01-19 | End: 2018-01-22 | Stop reason: HOSPADM

## 2018-01-19 RX ORDER — PROMETHAZINE HYDROCHLORIDE 25 MG/1
25 SUPPOSITORY RECTAL ONCE AS NEEDED
Status: DISCONTINUED | OUTPATIENT
Start: 2018-01-19 | End: 2018-01-19 | Stop reason: HOSPADM

## 2018-01-19 RX ADMIN — NALOXONE HYDROCHLORIDE 0.1 MG: 0.4 INJECTION, SOLUTION INTRAMUSCULAR; INTRAVENOUS; SUBCUTANEOUS at 17:43

## 2018-01-19 RX ADMIN — EPHEDRINE SULFATE 5 MG: 50 INJECTION INTRAMUSCULAR; INTRAVENOUS; SUBCUTANEOUS at 17:05

## 2018-01-19 RX ADMIN — PROPOFOL 30 MG: 10 INJECTION, EMULSION INTRAVENOUS at 14:03

## 2018-01-19 RX ADMIN — HYDROMORPHONE HYDROCHLORIDE 1 MG: 2 INJECTION INTRAMUSCULAR; INTRAVENOUS; SUBCUTANEOUS at 14:05

## 2018-01-19 RX ADMIN — CEFAZOLIN SODIUM 2 G: 2 INJECTION, SOLUTION INTRAVENOUS at 12:40

## 2018-01-19 RX ADMIN — SUCCINYLCHOLINE CHLORIDE 60 MG: 20 INJECTION, SOLUTION INTRAMUSCULAR; INTRAVENOUS; PARENTERAL at 16:30

## 2018-01-19 RX ADMIN — NALOXONE HYDROCHLORIDE 0.1 MG: 0.4 INJECTION, SOLUTION INTRAMUSCULAR; INTRAVENOUS; SUBCUTANEOUS at 17:57

## 2018-01-19 RX ADMIN — FAMOTIDINE 20 MG: 10 INJECTION, SOLUTION INTRAVENOUS at 11:14

## 2018-01-19 RX ADMIN — CEFAZOLIN SODIUM 2 G: 2 INJECTION, SOLUTION INTRAVENOUS at 23:35

## 2018-01-19 RX ADMIN — SERTRALINE 100 MG: 100 TABLET, FILM COATED ORAL at 23:37

## 2018-01-19 RX ADMIN — FAMOTIDINE 20 MG: 20 TABLET, FILM COATED ORAL at 23:35

## 2018-01-19 RX ADMIN — LIDOCAINE HYDROCHLORIDE 60 MG: 20 INJECTION, SOLUTION INFILTRATION; PERINEURAL at 12:30

## 2018-01-19 RX ADMIN — SODIUM CHLORIDE, POTASSIUM CHLORIDE, SODIUM LACTATE AND CALCIUM CHLORIDE 9 ML/HR: 600; 310; 30; 20 INJECTION, SOLUTION INTRAVENOUS at 11:15

## 2018-01-19 RX ADMIN — MIDAZOLAM 1 MG: 1 INJECTION INTRAMUSCULAR; INTRAVENOUS at 11:28

## 2018-01-19 RX ADMIN — EPHEDRINE SULFATE 5 MG: 50 INJECTION INTRAMUSCULAR; INTRAVENOUS; SUBCUTANEOUS at 17:00

## 2018-01-19 RX ADMIN — ONDANSETRON 4 MG: 2 INJECTION INTRAMUSCULAR; INTRAVENOUS at 14:06

## 2018-01-19 RX ADMIN — PROPOFOL 150 MG: 10 INJECTION, EMULSION INTRAVENOUS at 12:30

## 2018-01-19 RX ADMIN — FENTANYL CITRATE 50 MCG: 50 INJECTION, SOLUTION INTRAMUSCULAR; INTRAVENOUS at 11:25

## 2018-01-19 RX ADMIN — CARVEDILOL 6.25 MG: 6.25 TABLET, FILM COATED ORAL at 23:36

## 2018-01-19 RX ADMIN — HYDROMORPHONE HYDROCHLORIDE 0.5 MG: 2 INJECTION INTRAMUSCULAR; INTRAVENOUS; SUBCUTANEOUS at 19:18

## 2018-01-19 RX ADMIN — DEXAMETHASONE SODIUM PHOSPHATE 8 MG: 10 INJECTION INTRAMUSCULAR; INTRAVENOUS at 12:42

## 2018-01-19 RX ADMIN — VALSARTAN 160 MG: 160 TABLET ORAL at 23:36

## 2018-01-19 RX ADMIN — DOCUSATE SODIUM -SENNOSIDES 2 TABLET: 50; 8.6 TABLET, COATED ORAL at 23:36

## 2018-01-19 RX ADMIN — SODIUM CHLORIDE, POTASSIUM CHLORIDE, SODIUM LACTATE AND CALCIUM CHLORIDE: 600; 310; 30; 20 INJECTION, SOLUTION INTRAVENOUS at 16:30

## 2018-01-19 RX ADMIN — NALOXONE HYDROCHLORIDE 0.1 MG: 0.4 INJECTION, SOLUTION INTRAMUSCULAR; INTRAVENOUS; SUBCUTANEOUS at 17:38

## 2018-01-19 RX ADMIN — HYDROMORPHONE HYDROCHLORIDE 1 MG: 2 INJECTION INTRAMUSCULAR; INTRAVENOUS; SUBCUTANEOUS at 15:40

## 2018-01-19 RX ADMIN — CEFAZOLIN SODIUM 2 G: 2 INJECTION, SOLUTION INTRAVENOUS at 16:15

## 2018-01-19 RX ADMIN — NALOXONE HYDROCHLORIDE 0.1 MG: 0.4 INJECTION, SOLUTION INTRAMUSCULAR; INTRAVENOUS; SUBCUTANEOUS at 17:40

## 2018-01-19 RX ADMIN — PROPOFOL 20 MG: 10 INJECTION, EMULSION INTRAVENOUS at 13:38

## 2018-01-19 RX ADMIN — EPHEDRINE SULFATE 5 MG: 50 INJECTION INTRAMUSCULAR; INTRAVENOUS; SUBCUTANEOUS at 17:14

## 2018-01-19 RX ADMIN — MIDAZOLAM 1 MG: 1 INJECTION INTRAMUSCULAR; INTRAVENOUS at 11:23

## 2018-01-19 NOTE — ANESTHESIA PROCEDURE NOTES
Airway  Urgency: elective    Airway not difficult    General Information and Staff    Patient location during procedure: OR  CRNA: VANESSA WHEELER    Indications and Patient Condition  Indications for airway management: airway protection    Preoxygenated: yes  Mask difficulty assessment: 1 - vent by mask    Final Airway Details  Final airway type: supraglottic airway      Successful airway: classic  Size 4    Number of attempts at approach: 1    Additional Comments  LMA placed easily.  Cuff MOP.

## 2018-01-19 NOTE — ANESTHESIA PROCEDURE NOTES
Peripheral Block    Patient location during procedure: holding area  Start time: 1/19/2018 11:30 AM  Stop time: 1/19/2018 11:37 AM  Reason for block: at surgeon's request and post-op pain management  Performed by  Anesthesiologist: GIDEON RIVAS  Preanesthetic Checklist  Completed: patient identified, site marked, surgical consent, pre-op evaluation, timeout performed, IV checked, risks and benefits discussed and monitors and equipment checked  Prep:  Sterile barriers:cap and gloves  Prep: ChloraPrep  Patient monitoring: blood pressure monitoring, continuous pulse oximetry and EKG  Procedure  Sedation:yes  Performed under: local infiltration  Guidance:ultrasound guided  ULTRASOUND INTERPRETATION. Using ultrasound guidance a 20 G gauge needle was placed in close proximity to the nerve, at which point, under ultrasound guidance anesthetic was injected in the area of the nerve and spread of the anesthesia was seen on ultrasound in close proximity thereto.  There were no abnormalities seen on ultrasound; a digital image was taken; and the patient tolerated the procedure with no complications. Images:still images obtained    Laterality:right  Block Type:popliteal  Injection Technique:single-shot  Needle Type:long-bevel  Needle Gauge:20 G    Medications  Local Injected:ropivacaine 0.5% Local Amount Injected:30mL  Post Assessment  Injection Assessment: negative aspiration for heme, no paresthesia on injection and incremental injection  Patient Tolerance:comfortable throughout block  Complications:no

## 2018-01-19 NOTE — ANESTHESIA PREPROCEDURE EVALUATION
Anesthesia Evaluation     Patient summary reviewed and Nursing notes reviewed   NPO Solid Status: > 8 hours  NPO Liquid Status: > 8 hours     Airway   Mallampati: II  TM distance: >3 FB  Neck ROM: full  no difficulty expected  Dental - normal exam     Pulmonary - negative pulmonary ROS and normal exam   Cardiovascular - normal exam    (+) hypertension,       Neuro/Psych  (+) numbness, psychiatric history Depression,     GI/Hepatic/Renal/Endo    (+) obesity, morbid obesity, GERD, hypothyroidism,     Musculoskeletal     Abdominal  - normal exam   Substance History - negative use     OB/GYN          Other   (+) arthritis   history of cancer                                            Anesthesia Plan    ASA 3     general and regional   (Popliteal block)  intravenous induction   Anesthetic plan and risks discussed with patient and other.

## 2018-01-19 NOTE — OP NOTE
Procedure Note    Renae Tidwell  1/19/2018    Pre-op Diagnosis:   1.  Severe degenerative right foot pes planovalgus  2.  Right ankle arthritis  3.  Right subtalar arthritis  4.  Right talonavicular arthritis with chronic subluxation         Post-Op Diagnosis Codes:  Same    Procedure:  1.  Right ankle fusion  2.  Right subtalar fusion  3.  Right talonavicular fusion  4.  Right talar head osteotomy  5.  Right Achilles tenotomy    Surgeon(s):  Richardson Summers Jr., MD    Anesthesia: Regional plus LMA      Estimated Blood Loss: 200ml    Specimens:                None      Drains:   None    Complications: None apparent    Disposition: Stable to PACU for recovery      Indications for procedure: The patient is a pleasant 82-year-old female who presented with significant pain and dysfunction associated with severe degenerative right has planovalgus with chronic peritalar subluxation/dislocation.  This was refractory to all nonoperative management.  She requested surgical intervention.  The above listed procedures were recommended. The risks/benefits of surgery, including pain, infection, wound healing problems, need for future procedures, mal/nonunion, DVT/PE, cardiac event, and/or death were discussed, and the patient elected to proceed with surgery.    Procedure in detail:  The correct patient was identified in preoperative holding.  All risks and benefits of surgery were again discussed in detail, and the patient agreed to proceed with surgery.  The operative extremity was confirmed and marked by myself.  Operative consent reviewed and confirmed to be signed by the patient and myself.    At this time, the patient was wheeled to the operative theatre and placed supine on the OR table.  PRINCESS/SCD placed on nonoperative leg. Anesthesia was induced smoothly by our anesthesia colleagues.   Well padded nonsterile tourniquet placed on the upper thigh. The right lower extremity was prepped and draped in standard sterile fashion.   Appropriate presurgical timeout was performed, confirming correct patient, correct extremity, correct procedure, availability of sterile instruments/implants, and the administration of intravenous antibiotics in the form of Ancef within one hour of skin incision.      A longitudinal incision was made in the lateral ankle over the distal aspect of the fibula.  Dissection carried down to the lateral fibular cortex.  Full thickness subperiosteal flaps anteriorly and posteriorly.  A small oscillating saw was used to osteotomize the distal fibula.  The distal fibula was grasped, freed of soft tissue, and removed.  The lateral tibiotalar joint was then exposed. The ankle joint was distracted with use of a Hinterman distractor.  A combination of rongeurs, chisels, and osteotomes were used to remove any remaining cartilage and to expose bleeding subchondral bone..  The joint was thoroughly irrigated with sterile saline solution and then prepared for arthrodesis through multiple drill holes , along with feathering with a small osteotome to increase the surface area for arthrodesis.      The Hintermann distractor was applied across the subtalar joint and this joint was prepared by careful removal of remaining cartilage with osteotomes and curettes, exposing subchondral bone.  This joint was irrigated and prepared for arthrodesis in similar fashion via drilling and feathering techniques.      At this time, I attempted to reduce the ankle and hindfoot.  She still had significant hindfoot valgus and midfoot abduction.  Her talar head was palpable medially and was clearly chronically dislocated.  A longitudinal incision was made on the dorsomedial aspect of the hindfoot.  Dissection was carried down bluntly to the tibialis anterior tendon which was protected and retracted.  Dissection was carried down to the talar head.  This was chronically dislocated in relation to the medial cuneiform.  The base of the medial cuneiform was  identified. A combination of rongeurs, chisels, and osteotomes were used to remove any remaining cartilage and to expose bleeding subchondral bone.  The joint was thoroughly irrigated with sterile saline solution and then prepared for arthrodesis through multiple drill holes, along with feathering with a small osteotome to increase the surface area for arthrodesis.      It was evident that the patient's severe rigid midfoot abduction would not be corrected without talar head osteotomy.  Under fluoroscopy, using a small oscillating saw I osteotomized the talar head, shortening the talus until the midfoot could be reduced.    I could now reduce the ankle and hindfoot but she remained in significant equinus.  The Achilles tendon was palpated.  A percutaneous tenotomy was performed through a small stab incision.  This resulted in excellent gain of dorsiflexion and ability to reduce the ankle and hindfoot into a plantigrade position.      At this time, demineralized bone matrix was mixed with Augment.  This bone graft mixture was used to fill significant bony voids in both the ankle, subtalar and talonavicular joints, followed by reduction of the ankle and subtalar joints and preliminary fixation with large Steinmann pin.  Multiple fluoroscopic views and clinical assessment confirmed appropriate position of the arthrodesis construct, including alignment, rotation, and osseous apposition.      A short incision on the plantar foot was made in longitudinal fashion just distal to the calcaneal fat pad, careful soft tissue dissection onto the plantar calcaneus was performed, and the guide wire for the Medshape Dynanail hindfoot nail was appropriately positioned in all planes, as assessed by fluoroscopy.  A soft tissue protector was placed and entry reamer used into the hindfoot, ankle, and tibia.  All instrumentation was removed and the ball-tipped guide wire placed and confirmed to be intramedullary on fluoroscopy and by  palpation.  We then sequentially reamed up in 0.5 mm increments until appropriate audible chatter heard/felt.  We reamed 1.5 mm over our intended nail diameter.  A 12mm x 220mm Dynanail was selected, attached to the targeting guide, and carefully placed and impacted into appropriate position in the calcaneus, talus, and tibia.  The internal compressive element was stretched to gain 6 mm of compression.  The posterior to anterior calcaneal screw was then placed in standard fashion via small stab incision in the posterior heel, using the outrigger guide arm and appropriate trocars.  The lateral to medial transverse calcaneal screw was then placed using the appropriate outrigger guide arm and appropriate trocars.      Attention was then turned proximally.  Using the appropriate guide arm, a small stab incision was made over the medial tibia.  A drill was placed in bicortical fashion through the proximal compression hole and threaded into a second drill trocar on the lateral aspect of the guide.  Using the compression wheel, 2 mm of external compression were then applied.  Using the appropriate guide arm, a small stab incision was made over the medial tibia over the most proximal static hole.  This was drilled with a second drill bit, measured, and appropriate length bicortical screw was placed in standard fashion.  At this time, the first drill bit was removed and second appropriate length bicortical screw was placed in standard fashion in the second tibial hole.      At this time, the compressive element was released, and the outrigger removed.  At this time, it was evident that the posterior to anterior calcaneal screw had missed the nail.  As a result, I attempted to remove this screw using the appropriate screwdriver, but the nail would not back out and just spun in the calcaneus.  Given that the screw was completely contained within the calcaneus, I did not want to attempt to remove it using a trephine, as this  would necessitate some bone loss and potentially compromise fixation of a second screw.  The screw was thus left in place.    The 2 proximal static interlocks were removed.  The subtalar lateral to medial screw was removed.  The guide outrigger was reattached, and the nail carefully placed and impacted into appropriate position in the calcaneus, talus, and tibia.  The internal compressive element was again stretched to gain 6 mm of compression.  A second posterior to anterior calcaneal screw was then placed in standard fashion via small stab incision in the posterior heel, using the outrigger guide arm and appropriate trocars.  The lateral to medial transverse calcaneal screw was then placed using the appropriate outrigger guide arm and appropriate trocars.      Attention was then turned proximally.  Using the appropriate guide arm, a small stab incision was made over the medial tibia.  A drill was placed in bicortical fashion through the proximal compression hole and threaded into a second drill trocar on the lateral aspect of the guide.  Using the compression wheel, 2 mm of external compression were then applied.  Using the appropriate guide arm, a small stab incision was made over the medial tibia over the most proximal static hole.  This was drilled with a second drill bit, measured, and appropriate length bicortical screw was placed in standard fashion.  At this time, the first drill bit was removed and second appropriate length bicortical screw was placed in standard fashion in the second tibial hole.      At this time, the compressive element was released, and the outrigger removed.    Attention was then turned to the talonavicular joint.  A small amount of bone graft and augment was placed in the intended arthrodesis site.  The joint was reduced and provisionally pinned in place.  A single [id number] [employer] nitinol compression H-staple was then placed in idealized position across the fusion site.  This  "resulted in excellent compression and stable fixation.     Fluoroscopic AP/lateral views of the ankle, AP foot, and Schofield heel views confirmed the nail and interlock screws to be of appropriate position and length, along with excellent joint arthrodesis position and alignment.   There was excellent correction of severe preoperative midfoot abduction with realignment of the medial column.    All wounds were thoroughly irrigated out with sterile saline solution and meticulous, layered closure ensued with 00 Vicryl, 000 vicryl, and staples.      The skin was cleaned and dried and a sterile dressing applied, followed by a well-padded, short leg splint (posterior slab and stirrup).  The tourniquet had been released and brisk capillary refill returned to all toes.  The patient was awoken from anesthesia without apparent complication and taken to PACU for recovery.      Postoperative Plan:  Weightbearing status: Non-weightbearing in the splint  DVT Prophylaxis: Aspirin 325mg daily;  Patient will be instructed to elevate as much as possible (\"toes above the nose\") and to get up and move around at least once per hour while awake to help minimize risk of blood clots.          Richardson Summers Jr, MD     Date: 1/19/2018  Time: 6:18 PM        "

## 2018-01-19 NOTE — PLAN OF CARE
Problem: Patient Care Overview (Adult)  Goal: Plan of Care Review  Outcome: Ongoing (interventions implemented as appropriate)   01/19/18 1056   Coping/Psychosocial Response Interventions   Plan Of Care Reviewed With patient   Patient Care Overview   Progress no change     Goal: Adult Individualization and Mutuality  Outcome: Ongoing (interventions implemented as appropriate)   01/19/18 1056   Individualization   Patient Specific Preferences Renae     Goal: Discharge Needs Assessment  Outcome: Ongoing (interventions implemented as appropriate)      Problem: Perioperative Period (Adult)  Goal: Signs and Symptoms of Listed Potential Problems Will be Absent or Manageable (Perioperative Period)  Outcome: Ongoing (interventions implemented as appropriate)   01/19/18 1056   Perioperative Period   Problems Assessed (Perioperative Period) all   Problems Present (Perioperative Period) pain

## 2018-01-19 NOTE — ANESTHESIA POSTPROCEDURE EVALUATION
"Patient: Renae Tidwell    Procedure Summary     Date Anesthesia Start Anesthesia Stop Room / Location    01/19/18 [id number]  [facility]       Procedure Diagnosis Surgeon Provider    RIGHT ANKLE FUSION, SUBTALAR FUSION, ACHILLES LENGTHENING, TALONAVICULAR FUSION (Right Ankle) No diagnosis on file. MD Richardson Rendon Jr., MD          Anesthesia Type: general, regional  Last vitals  BP   146/89 (01/19/18 1813)   Temp   36.5 °C (97.7 °F) (01/19/18 1044)   Pulse   108 (01/19/18 1813)   Resp   16 (01/19/18 1813)     SpO2   95 % (01/19/18 1813)     Post Anesthesia Care and Evaluation    Patient location during evaluation: PACU  Patient participation: complete - patient participated  Level of consciousness: awake and alert  Pain score: 0  Pain management: adequate  Airway patency: patent  Anesthetic complications: No anesthetic complications    Cardiovascular status: acceptable  Respiratory status: acceptable  Hydration status: acceptable    Comments: /89  Pulse 108  Temp 36.5 °C (97.7 °F) (Oral)   Resp 16  Ht 167.6 cm (66\")  Wt 99.5 kg (219 lb 5 oz)  LMP Comment: hysterctomy  SpO2 95%  BMI 35.4 kg/m2      "

## 2018-01-20 LAB
DEPRECATED RDW RBC AUTO: 49.7 FL (ref 37–54)
ERYTHROCYTE [DISTWIDTH] IN BLOOD BY AUTOMATED COUNT: 13.8 % (ref 11.7–13)
HCT VFR BLD AUTO: 29.4 % (ref 35.6–45.5)
HGB BLD-MCNC: 9.3 G/DL (ref 11.9–15.5)
MCH RBC QN AUTO: 31.1 PG (ref 26.9–32)
MCHC RBC AUTO-ENTMCNC: 31.6 G/DL (ref 32.4–36.3)
MCV RBC AUTO: 98.3 FL (ref 80.5–98.2)
PLATELET # BLD AUTO: 241 10*3/MM3 (ref 140–500)
PMV BLD AUTO: 9.7 FL (ref 6–12)
RBC # BLD AUTO: 2.99 10*6/MM3 (ref 3.9–5.2)
WBC NRBC COR # BLD: 12.44 10*3/MM3 (ref 4.5–10.7)

## 2018-01-20 PROCEDURE — 25010000003 CEFAZOLIN IN DEXTROSE 2-4 GM/100ML-% SOLUTION: Performed by: ORTHOPAEDIC SURGERY

## 2018-01-20 PROCEDURE — 85027 COMPLETE CBC AUTOMATED: CPT | Performed by: ORTHOPAEDIC SURGERY

## 2018-01-20 PROCEDURE — 97161 PT EVAL LOW COMPLEX 20 MIN: CPT

## 2018-01-20 RX ORDER — HYDROCODONE BITARTRATE AND ACETAMINOPHEN 10; 325 MG/1; MG/1
1 TABLET ORAL EVERY 4 HOURS PRN
Qty: 90 TABLET | Refills: 0 | Status: SHIPPED | OUTPATIENT
Start: 2018-01-20 | End: 2018-02-12 | Stop reason: HOSPADM

## 2018-01-20 RX ORDER — HYDROCODONE BITARTRATE AND ACETAMINOPHEN 10; 325 MG/1; MG/1
1 TABLET ORAL EVERY 4 HOURS PRN
Status: DISCONTINUED | OUTPATIENT
Start: 2018-01-20 | End: 2018-01-22 | Stop reason: HOSPADM

## 2018-01-20 RX ORDER — HYDROCODONE BITARTRATE AND ACETAMINOPHEN 10; 325 MG/1; MG/1
2 TABLET ORAL EVERY 4 HOURS PRN
Status: DISCONTINUED | OUTPATIENT
Start: 2018-01-20 | End: 2018-01-22 | Stop reason: HOSPADM

## 2018-01-20 RX ORDER — HYDROCODONE BITARTRATE AND ACETAMINOPHEN 10; 325 MG/1; MG/1
2 TABLET ORAL EVERY 4 HOURS PRN
Status: DISCONTINUED | OUTPATIENT
Start: 2018-01-30 | End: 2018-01-20

## 2018-01-20 RX ORDER — HYDROCODONE BITARTRATE AND ACETAMINOPHEN 10; 325 MG/1; MG/1
1 TABLET ORAL EVERY 4 HOURS PRN
Status: DISCONTINUED | OUTPATIENT
Start: 2018-01-20 | End: 2018-01-20

## 2018-01-20 RX ORDER — BACLOFEN 10 MG/1
10 TABLET ORAL 4 TIMES DAILY
Status: DISCONTINUED | OUTPATIENT
Start: 2018-01-20 | End: 2018-01-22 | Stop reason: HOSPADM

## 2018-01-20 RX ADMIN — ASPIRIN 325 MG: 325 TABLET, COATED ORAL at 09:47

## 2018-01-20 RX ADMIN — CARVEDILOL 6.25 MG: 6.25 TABLET, FILM COATED ORAL at 18:28

## 2018-01-20 RX ADMIN — HYDROCODONE BITARTRATE AND ACETAMINOPHEN 2 TABLET: 10; 325 TABLET ORAL at 10:36

## 2018-01-20 RX ADMIN — VALSARTAN 160 MG: 160 TABLET ORAL at 16:36

## 2018-01-20 RX ADMIN — OXYCODONE HYDROCHLORIDE AND ACETAMINOPHEN 1 TABLET: 10; 325 TABLET ORAL at 06:35

## 2018-01-20 RX ADMIN — BACLOFEN 10 MG: 10 TABLET ORAL at 21:02

## 2018-01-20 RX ADMIN — MISOPROSTOL 200 MCG: 200 TABLET ORAL at 09:47

## 2018-01-20 RX ADMIN — FAMOTIDINE 20 MG: 20 TABLET, FILM COATED ORAL at 20:11

## 2018-01-20 RX ADMIN — DOCUSATE SODIUM -SENNOSIDES 2 TABLET: 50; 8.6 TABLET, COATED ORAL at 20:11

## 2018-01-20 RX ADMIN — CEFAZOLIN SODIUM 2 G: 2 INJECTION, SOLUTION INTRAVENOUS at 16:36

## 2018-01-20 RX ADMIN — CARVEDILOL 6.25 MG: 6.25 TABLET, FILM COATED ORAL at 09:47

## 2018-01-20 RX ADMIN — HYDROMORPHONE HYDROCHLORIDE 0.5 MG: 10 INJECTION INTRAMUSCULAR; INTRAVENOUS; SUBCUTANEOUS at 09:47

## 2018-01-20 RX ADMIN — OXYBUTYNIN CHLORIDE 5 MG: 5 TABLET, EXTENDED RELEASE ORAL at 09:47

## 2018-01-20 RX ADMIN — HYDROCODONE BITARTRATE AND ACETAMINOPHEN 2 TABLET: 10; 325 TABLET ORAL at 23:39

## 2018-01-20 RX ADMIN — SERTRALINE 100 MG: 100 TABLET, FILM COATED ORAL at 16:36

## 2018-01-20 RX ADMIN — HYDROCODONE BITARTRATE AND ACETAMINOPHEN 2 TABLET: 10; 325 TABLET ORAL at 14:30

## 2018-01-20 RX ADMIN — HYDROCODONE BITARTRATE AND ACETAMINOPHEN 2 TABLET: 10; 325 TABLET ORAL at 18:28

## 2018-01-20 RX ADMIN — PANTOPRAZOLE SODIUM 40 MG: 40 TABLET, DELAYED RELEASE ORAL at 06:28

## 2018-01-20 RX ADMIN — FAMOTIDINE 20 MG: 20 TABLET, FILM COATED ORAL at 09:47

## 2018-01-20 RX ADMIN — LEVOTHYROXINE SODIUM 88 MCG: 88 TABLET ORAL at 09:47

## 2018-01-20 RX ADMIN — CEFAZOLIN SODIUM 2 G: 2 INJECTION, SOLUTION INTRAVENOUS at 09:48

## 2018-01-20 NOTE — DISCHARGE PLACEMENT REQUEST
"Renae Mac (82 y.o. Female)     Date of Birth Social Security Number Address Home Phone MRN    1935  [address] [place] [address] RO[address]  [place] [id number] [phone] [id number]    Hoahaoism Marital Status          Sabianist Single       Admission Date Admission Type Admitting Provider Attending Provider Department, Room/Bed    1/19/18 Elective Richardson Summers Jr., MD Lewis, John S Jr., MD [address], P895/1    Discharge Date Discharge Disposition Discharge Destination                      Attending Provider: Richardson Summers Jr., MD     Allergies:  Aspirin, Grass, Morphine And Related, Other, Adhesive Tape    Isolation:  None   Infection:  None   Code Status:  FULL    Ht:  167.6 cm (66\")   Wt:  99.5 kg (219 lb 5 oz)    Admission Cmt:  None   Principal Problem:  None                Active Insurance as of 1/19/2018     Primary Coverage     Payor Plan Insurance Group Employer/Plan Group    MEDICARE MEDICARE A & B      Payor Plan Address Payor Plan Phone Number Effective From Effective To    [address] [phone] 5/1/2000     [place] [address]       Subscriber Name Subscriber Birth Date Member ID       RENAE MAC 1935 [id number]           Secondary Coverage     Payor Plan Insurance Group Employer/Plan Group    [place]      Payor Plan Address Payor Plan Phone Number Effective From Effective To    [address] [phone] 4/11/2016     [place] [address]       Subscriber Name Subscriber Birth Date Member ID       RENAE MAC 1935 [id number]                 Emergency Contacts      (Rel.) Home Phone Work Phone Mobile Phone    Lluvia Alberto (Other) [phone] -- --              "

## 2018-01-20 NOTE — PLAN OF CARE
Problem: Patient Care Overview (Adult)  Goal: Plan of Care Review  Outcome: Ongoing (interventions implemented as appropriate)   01/20/18 0228   Coping/Psychosocial Response Interventions   Plan Of Care Reviewed With patient;other (see comments)   Patient Care Overview   Progress improving   Outcome Evaluation   Outcome Summary/Follow up Plan Admitted from PACU following right ankle fusion. PT denies pain reports extremity remains numb secondary to popliteal block. Peripheral vascular assessments WNL. Alert and oriented X4. VSS. PT and friend verbalize understanding of educational topics to include monitoring of b/p, compliance with medications and when to notify of MD of problem.        Problem: Perioperative Period (Adult)  Goal: Signs and Symptoms of Listed Potential Problems Will be Absent or Manageable (Perioperative Period)  Outcome: Ongoing (interventions implemented as appropriate)   01/20/18 0228   Perioperative Period   Problems Assessed (Perioperative Period) all   Problems Present (Perioperative Period) none       Problem: Fall Risk (Adult)  Goal: Identify Related Risk Factors and Signs and Symptoms  Outcome: Outcome(s) achieved Date Met: 01/20/18 01/20/18 0228   Fall Risk   Fall Risk: Related Risk Factors age-related changes;gait/mobility problems;polypharmacy   Fall Risk: Signs and Symptoms presence of risk factors     Goal: Absence of Falls  Outcome: Ongoing (interventions implemented as appropriate)   01/20/18 0228   Fall Risk (Adult)   Absence of Falls achieves outcome

## 2018-01-20 NOTE — PROGRESS NOTES
"Orthopaedic Foot and Ankle Surgery  Daily Progress Note    BP 96/55 (BP Location: Right arm, Patient Position: Lying)  Pulse 71  Temp 96.9 °F (36.1 °C) (Oral)   Resp 16  Ht 167.6 cm (66\")  Wt 99.5 kg (219 lb 5 oz)  LMP Comment: hysterctomy  SpO2 99%  BMI 35.4 kg/m2      Imaging Results (last 24 hours)     Procedure Component Value Units Date/Time    FL C Arm During Surgery [[id number]] Resulted:  01/19/18 1741     Updated:  01/19/18 1741    Narrative:       This procedure was auto-finalized with no dictation required.    XR Ankle 3+ View Right [[id number]] Collected:  01/19/18 1854     Updated:  01/19/18 1921    Narrative:       RIGHT ANKLE     HISTORY: Right ankle fusion.     RIGHT ANKLE: Eight intraoperative views.     FINDINGS: Imaging demonstrates subtalar and tibiotalar fusion with  retrograde placement of a nail transfixing the subtalar and tibiotalar  joints. Cannulated screws transfix the calcaneus and there is also  fusion device overlying the anterior talus. Exam performed for  intraoperative localization and control.     FLUOROSCOPY TIME:   Five minutes 45 seconds.     This report was finalized on 1/19/2018 7:18 PM by Dr. Caden Xiao MD.             Patient Care Team:  Anen Brooks MD as PCP - General (Internal Medicine)  Corey Pearson MD as Consulting Physician (Hematology and Oncology)  Nate Knapp MD as Referring Physician (Breast Surgery)    SUBJECTIVE  Pain controlled, block starting to wear off    PHYSICAL EXAM  Resting in NAD  Splint clean, dry, intact  Toes warm, perfused, brisk capillary refill  Flexes/extends toes  Minimal sensation 2/2 block  No pain with passive stretch     Active Problems:    Ankle arthritis      PLAN / DISPOSITION:  POD 1 s/p R TTC, doing well  1. Pain control: Orals plus IV for breakthrough  2.  Antibiotics: Periop Ancef to complete today  3.  PT: NWB RLE  4. DVT: ASA 325mg plus PRINCESS/SCD, mobilization  5. Dispo: to rehab Monday, follow up 2-3 " weeks with me at [facility] ([phone] to make appointment)    Richardson Summers Jr, MD  01/20/18  8:41 AM

## 2018-01-20 NOTE — PLAN OF CARE
Problem: Inpatient Physical Therapy  Goal: Bed Mobility Goal LTG- PT   01/20/18 0914   Bed Mobility PT LTG   Bed Mobility PT LTG, Date Established 01/20/18   Bed Mobility PT LTG, Time to Achieve 1 wk   Bed Mobility PT LTG, Activity Type all bed mobility   Bed Mobility PT LTG, [place] Level contact guard assist

## 2018-01-20 NOTE — PROGRESS NOTES
Discharge Planning Assessment  [facility]     Patient Name: Renae Tidwell  MRN: [id number]  Today's Date: 1/20/2018    Admit Date: 1/19/2018          Discharge Needs Assessment       01/20/18 1443    Living Environment    Lives With other (see comments)   friend Lluvia Alberto    Living Arrangements apartment    Home Accessibility no concerns    Stair Railings at Home outside, present at both sides    Type of Financial/Environmental Concern none    Transportation Available ambulance    Living Environment    Provides Primary Care For no one    Quality Of Family Relationships unable to assess    Able to Return to Prior Living Arrangements no    Living Arrangement Comments states she has pre-registered at the [facility] for anticipated admission on Monday after her hospital stay    Discharge Needs Assessment    Concerns To Be Addressed transportation;discharge planning concerns    Readmission Within The Last 30 Days no previous admission in last 30 days    Outpatient/Agency/Support Group Needs other (see comments)    Community Agency Name(S) denies previous use of HH. States she has been to the [facility] in the past    Anticipated Changes Related to Illness inability to care for self    Equipment Currently Used at Home cane, straight;other (see comments)   states she has a BP cuff that she occasionally uses at home    Equipment Needed After Discharge none    Discharge Facility/Level Of Care Needs nursing facility, skilled    Current Discharge Risk physical impairment    Discharge Disposition still a patient            Discharge Plan       01/20/18 1446    Case Management/Social Work Plan    Plan ambulance transport to [facility] for SNF    Patient/Family In Agreement With Plan yes   pt and with pt permission her friend Lluvia    Additional Comments Introduced self/explained role of CCP. Face sheet data confirmed. Updated pharmacy per pt request,  stating while she is in the [facility] she will be using [facility] pharmacy for her meds. States she lives in an apartment with Nancy. She uses a cane for ambulation. Denies previous HH use. States she has been to the [facility] in the past. Pt plans for DC on Monday and said the MD told her she would have to go by ambulance. Ambulance disclaimer statement given and pt verbalized understanding. States she has used ambulance transport in the past and it was covered. Attempted to contact [facility] to arrange transportation for Monday and according to the supervisor, CCP will have to go thru the [facility] for [place] on Monday. Yellow packet started and to be placed on pt's chart. CCP to follow............JW        Discharge Placement     Facility/Agency Request Status Selected? Address Phone Number Fax Number    [facility] Accepted    Yes [address], [place] [address] [phone]         Geraldine Salazar RN 1/20/2018 14:15    Spoke with MICHELLE Rodriguez and pt is scheduled to be admitted on Monday                           Demographic Summary       01/20/18 1442    Referral Information    Admission Type inpatient    Arrived From admitted as an inpatient    Referral Source admission list;physician    Reason For Consult discharge planning    Record Reviewed medical record    Contact Information    Permission Granted to Share Information With ;family/designee            Functional Status       01/20/18 1442    Functional Status Current    Ambulation 3-->assistive equipment and person    Transferring 3-->assistive equipment and person    Toileting 3-->assistive equipment and person    Bathing 2-->assistive person    Dressing 2-->assistive person    Eating 0-->independent    Communication 0-->understands/communicates without difficulty    Swallowing (if score 2 or more for any item, consult Rehab Services) 0-->swallows foods/liquids without difficulty    Current  Functional Level Comment pt encouraged to call for staff assist for needs    Change in Functional Status Since Onset of Current Illness/Injury yes    Functional Status Prior    Ambulation 1-->assistive equipment    Transferring 0-->independent    Toileting 0-->independent    Bathing 0-->independent    Dressing 0-->independent    Eating 0-->independent    Communication 0-->understands/communicates without difficulty    Swallowing 0-->swallows foods/liquids without difficulty    IADL    Medications independent    Meal Preparation independent    Housekeeping independent    Laundry independent    Shopping independent    Oral Care independent    Activity Tolerance    Current Activity Limitations non-weight bearing, lower extremity right    Usual Activity Tolerance good    Current Activity Tolerance fair    Cognitive/Perceptual/Developmental    Current Mental Status/Cognitive Functioning no deficits noted            Psychosocial     None            Abuse/Neglect     None            Legal     None            Substance Abuse     None            Patient Forms     None          Geradline Salazar, RN

## 2018-01-20 NOTE — PLAN OF CARE
Problem: Inpatient Physical Therapy  Goal: Gait Training Goal LTG- PT   01/20/18 0915   Gait Training PT LTG   Gait Training Goal PT LTG, Date Established 01/20/18   Gait Training Goal PT LTG, Time to Achieve 1 wk   Gait Training Goal PT LTG, [place] Level contact guard assist   Gait Training Goal PT LTG, Assist Device walker, rolling  (OBEY WEIGHTBEARING PRECAUTIONS. )   Gait Training Goal PT LTG, Distance to Achieve AMBULATE 100 FEET WITH WALKER.

## 2018-01-20 NOTE — THERAPY EVALUATION
Acute Care - Physical Therapy Initial Evaluation  [facility]     Patient Name: Renae Tidwell  : 1935  MRN: [id number]  Today's Date: 2018   Onset of Illness/Injury or Date of Surgery Date: 18  Date of Referral to PT: 18  Referring Physician: BRANDT FINE      Admit Date: 2018     Visit Dx:    ICD-10-CM ICD-9-CM   1. 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   • Compression fracture 1970    At L4-L5   • DCIS (ductal carcinoma in situ) of breast     Of left breast post mastectomy   • Depression    • Esophageal ulceration    • GERD (gastroesophageal reflux disease)    • H/O seasonal allergies    • Hypertension    • Hypothyroidism    • MRSA (methicillin resistant Staphylococcus aureus) 2009    Of neck/TREATED AT [facility] BY PCP DR MARTELL LE   • Osteoporosis    • Thoracic outlet syndrome    • Urinary incontinence     WEARS PAD     Past Surgical History:   Procedure Laterality Date   • BASAL CELL CARCINOMA EXCISION      Nose   • BONE RESECTION      Rib resections for thoracic outlet syndrome   • BREAST SURGERY       right mastectomy and needle biopsy; 2013 left mastectomy for DCIS   • CHOLECYSTECTOMY     • EYE SURGERY      Bilateral cataract removal   • HYSTERECTOMY      complete   • KNEE ARTHROSCOPY  2006    Right knee   • MASTECTOMY Right 2012    And needle biopsy   • NASAL SEPTUM  SURGERY  1968   • OOPHORECTOMY  1987   • OTHER SURGICAL HISTORY  12/2008    Lumbar forminal and central stenosis requiring surgery   • PLANTAR'S WART EXCISION  2003   • ROTATOR CUFF REPAIR  2007    Right side   • TENDON REPAIR  05/2011    Of right foot   • TONSILLECTOMY  1952   • TOTAL KNEE ARTHROPLASTY Left 2004   • TOTAL SHOULDER ARTHROPLASTY W/ DISTAL CLAVICLE EXCISION Right 6/22/2017    Procedure: RIGHT TOTAL SHOULDER REVERSE ARTHROPLASTY W/ AUGMENT ;  Surgeon: Kofi Rivas MD;  [place] [facility] [place] [facility] [place]:    • WRIST SURGERY  1970's    Four surgeries for ganglion cyst          PT ASSESSMENT (last 72 hours)      PT Evaluation       01/20/18 0903 01/19/18 1048    Rehab Evaluation    Document Type evaluation  -JR     Subjective Information agree to therapy;complains of;weakness;fatigue;pain  -JR     Patient Effort, Rehab Treatment excellent  -JR     Symptoms Noted During/After Treatment fatigue  -JR     General Information    Patient Profile Review yes  -JR     Onset of Illness/Injury or Date of Surgery Date 01/19/18  -JR     Referring Physician PER DR. FINE  -JR     General Observations SUPINE IN BED.  FRIEND AND DR. FINE ARE PRESENT. R LE ELEVATED.   -JR     Pertinent History Of Current Problem HISTORY OF CHRONIC R ANKLE ARTHRITIS.   -JR     Precautions/Limitations non-weight bearing status   NWB ON R LE.    -JR     Prior Level of Function independent:   USES CANE TO AMBULATE.    -JR     Equipment Currently Used at Home cane, straight  -JR cane, straight;walker, standard;walker, rolling  -KR    Plans/Goals Discussed With patient and family;agreed upon  -JR     Risks Reviewed patient and family:;LOB;nausea/vomiting;increased discomfort;dizziness;change in vital signs;increased drainage;lines disloged  -JR     Benefits Reviewed patient and family:;improve function;increase independence;increase strength;increase balance;decrease pain;decrease risk of DVT;improve skin integrity;increase knowledge  -JR      Barriers to Rehab none identified  -JR     Living Environment    Lives With other (see comments)   FRIEND  -JR other (see comments)   pt lives with friend Lluvia Alberto  -KR    Living Arrangements apartment  -JR apartment  -KR    Home Accessibility no concerns  -JR no concerns;bed and bath on same level  -KR    Transportation Available  car;family or friend will provide  -KR    Clinical Impression    Date of Referral to PT 01/19/18  -JR     PT Diagnosis DECREASED MOBILITY  -JR     Prognosis GOOD  -JR     Functional Level At Time Of Evaluation MIN-MOD ASSIST X 1 FOR TRANSFERS.   -JR     Patient/Family Goals Statement GO HOME  -JR     Criteria for Skilled Therapeutic Interventions Met yes;treatment indicated  -JR     Pathology/Pathophysiology Noted (Describe Specifically for Each System) musculoskeletal;cardiovascular  -JR     Impairments Found (describe specific impairments) aerobic capacity/endurance;gait, locomotion, and balance;muscle performance;ROM  -JR     Functional Limitations in Following Categories (Describe Specific Limitations) self-care;home management;community/leisure  -JR     Rehab Potential good, to achieve stated therapy goals  -JR     Predicted Duration of Therapy Intervention (days/wks) 6-12 WEEKS  -JR     Vital Signs    Pre SpO2 (%) 97  -JR     O2 Delivery Pre Treatment room air  -JR     O2 Delivery Intra Treatment room air  -JR     Post SpO2 (%) 97  -JR     O2 Delivery Post Treatment room air  -JR     Pain Assessment    Pain Assessment 0-10  -JR     Pain Score 5  -JR     Pain Type Surgical pain  -JR     Pain Location Ankle  -JR     Pain Orientation Right  -JR     Patient's Stated Pain Goal No pain  -JR     Pain Intervention(s) Medication (See MAR);Ambulation/increased activity;Repositioned  -JR     Response to Interventions TOLERATED.  -JR     Cognitive Assessment/Intervention    Current Cognitive/Communication Assessment functional  -JR     Orientation Status oriented x 4  -JR     Follows  Commands/Answers Questions 100% of the time  -     Personal Safety WNL/WFL  -JR     Personal Safety Interventions gait belt;muscle strengthening facilitated;nonskid shoes/slippers when out of bed   PREFERS TO WEAR SHOW ON L FOOT WHEN STANDING.   -     ROM (Range of Motion)    General ROM lower extremity range of motion deficits identified;other (see comments)   NOT TEST IN R FOOT/ANKLE. OTHERWISE, WFL'S FOR L LE AND UE.   -     General ROM Detail --   R SHOULDER WITH FULL PROM.  AROM TO 90 DEGREES.  PAST TSA.    -     MMT (Manual Muscle Testing)    General MMT Assessment lower extremity strength deficits identified;other (see comments)   NOT TEST IN R FOOT/ANKLE.  5/5 ON L UE AND LE. 4/5 R UE.    -     Bed Mobility, Assessment/Treatment    Bed Mobility, Assistive Device bed rails  -     Bed Mobility, Scoot/Bridge, [place] verbal cues required;nonverbal cues required (demo/gesture);contact guard assist   ABLE TO BRIDGE IN ORDER TO POSITION IN BED.   -     Bed Mob, Supine to Sit, [place] minimum assist (75% patient effort);verbal cues required;nonverbal cues required (demo/gesture)   ASSIST WITH R LE.   -     Bed Mob, Sit to Supine, [place] verbal cues required;nonverbal cues required (demo/gesture);minimum assist (75% patient effort);other (see comments)   ASSIST FOR R LE.    -JR     Transfer Assessment/Treatment    Transfers, Sit-Stand [place] nonverbal cues required (demo/gesture);verbal cues required;minimum assist (75% patient effort)  -JR     Transfers, Stand-Sit [place] nonverbal cues required (demo/gesture);verbal cues required;minimum assist (75% patient effort)  -     Transfers, Sit-Stand-Sit, Assist Device elevated surface;rolling walker   STOOD X 2 REPS FOR ABOUT 2 MINUTES AT A TIME.TOOK SIDESTEPS.  -JR     Transfer, Safety Issues weight-shifting ability decreased  -JR     Transfer, Impairments strength decreased  -     Gait Assessment/Treatment    Gait,  [place] Level not tested  -     Positioning and Restraints    Pre-Treatment Position in bed  -     Post Treatment Position bed  -JR     In Bed notified nsg;supine;call light within reach;RLE elevated;SCD pump applied  -       01/18/18 1096       General Information    Equipment Currently Used at Home cane, straight;walker, standard;other (see comments)   SPLINT AND ANKLE BRACE ON RIGHT  -     Living Environment    Lives With other relative(s) (specify)  -     Living Arrangements apartment   PT IS GOING TO REHAB AFTER D/C  -     Home Accessibility no concerns  -     Stair Railings at Home outside, present at both sides  -     Type of Financial/Environmental Concern none  -     Transportation Available family or friend will provide;agency transportation   PT IS GOING TO REHAB  -       User Key  (r) = Recorded By, (t) = Taken By, (c) = Cosigned By    Initials Name Provider Type    CHIDI Crowell, RN Registered Nurse    KARLA Bolaños RN Registered Nurse    JR Richardson Ortega, PT Physical Therapist          Physical Therapy Education     Title: PT OT SLP Therapies (Done)     Topic: Physical Therapy (Done)     Point: Mobility training (Done)    Learning Progress Summary    Learner Readiness Method Response Comment Documented by Status   Patient Eager E NATALIE JAEGER   01/20/18 0913 Done   Family NATALIE Siu   01/20/18 0913 Done               Point: Home exercise program (Done)    Learning Progress Summary    Learner Readiness Method Response Comment Documented by Status   Patient Eager E NATALIE JAEGER   01/20/18 0913 Done   Family NATALIE Siu   01/20/18 0913 Done               Point: Body mechanics (Done)    Learning Progress Summary    Learner Readiness Method Response Comment Documented by Status   Patient Eager E NATALIE JAEGER   01/20/18 0913 Done   Family Eager GIANNA NATALIE JAEGER   01/20/18 0913 Done               Point: Precautions (Done)    Learning Progress Summary    Learner Readiness Method Response  Comment Documented by Status   Patient NATALIE Siu JR 01/20/18 0913 Done   Family NATALIE Siu JR 01/20/18 0913 Done                      User Key     Initials Effective Dates Name Provider Type [facility] 01/07/16 -  Richardson Ortega, PT Physical Therapist PT                PT Recommendation and Plan  Planned Therapy Interventions: balance training, bed mobility training, gait training, ROM (Range of Motion), strengthening  PT Frequency: daily, per priority policy             IP PT Goals       01/20/18 0915 01/20/18 0914       Bed Mobility PT LTG    Bed Mobility PT LTG, Date Established  01/20/18  -     Bed Mobility PT LTG, Time to Achieve  1 wk  -JR     Bed Mobility PT LTG, Activity Type  all bed mobility  -JR     Bed Mobility PT LTG, [place] Level  contact guard assist  -JR     Transfer Training PT LTG    Transfer Training PT LTG, Date Established  01/20/18  -     Transfer Training PT LTG, Time to Achieve  1 wk  -JR     Transfer Training PT LTG, Activity Type  all transfers  -JR     Transfer Training PT LTG, [place] Level  contact guard assist  -JR     Transfer Training PT LTG, Assist Device  walker, rolling   OBEY NWB PRECAUTIONS.   -JR     Gait Training PT LTG    Gait Training Goal PT LTG, Date Established 01/20/18  -      Gait Training Goal PT LTG, Time to Achieve 1 wk  -JR      Gait Training Goal PT LTG, [place] Level contact guard assist  -JR      Gait Training Goal PT LTG, Assist Device walker, rolling   OBEY WEIGHTBEARING PRECAUTIONS.   -JR      Gait Training Goal PT LTG, Distance to Achieve AMBULATE 100 FEET WITH WALKER.   -        User Key  (r) = Recorded By, (t) = Taken By, (c) = Cosigned By    Initials Name Provider Type    JR Richardson Ortega, PT Physical Therapist                Outcome Measures       01/20/18 0916          How much help from another person do you currently need...    Turning from your back to your side while in flat bed without using bedrails? 3  -       Moving from lying on back to sitting on the side of a flat bed without bedrails? 3  -JR      Moving to and from a bed to a chair (including a wheelchair)? 2  -JR      Standing up from a chair using your arms (e.g., wheelchair, bedside chair)? 3  -JR      Climbing 3-5 steps with a railing? 1  -JR      To walk in hospital room? 2  -JR      AM-PAC 6 Clicks Score 14  -JR      Functional Assessment    Outcome Measure Options 9 Hole Peg;AM-PAC 6 Clicks Basic Mobility (PT)  -JR        User Key  (r) = Recorded By, (t) = Taken By, (c) = Cosigned By    Initials Name Provider Type     Richardson Ortega PT Physical Therapist           Time Calculation:         PT Charges       01/20/18 0916          Time Calculation    Start Time 0840  -      Stop Time 0916  -      Time Calculation (min) 36 min  -      PT Received On 01/20/18  -      PT - Next Appointment 01/21/18  -      PT Goal Re-Cert Due Date 01/27/18  -        User Key  (r) = Recorded By, (t) = Taken By, (c) = Cosigned By    Initials Name Provider Type     Richardson Ortega, PT Physical Therapist          Therapy Charges for Today     Code Description Service Date Service Provider Modifiers Qty    [id number] HC PT EVAL LOW COMPLEXITY 2 1/20/2018 Richardson Ortega, PT GP 1          PT G-Codes  Outcome Measure Options: (S) 9 Hole Peg, AM-PAC 6 Clicks Basic Mobility (PT)      Richardson Ortega, PT  1/20/2018

## 2018-01-20 NOTE — DISCHARGE SUMMARY
" Orthopedic Discharge Summary      Patient: Renae Tidwell      YOB: 1935    Medical Record Number: [id number]    Attending Physician: Richardson Summers Jr., MD  Consulting Physician(s):   Date of Admission: 1/19/2018 10:18 AM  Date of Discharge: 1/22/16      Patient Active Problem List   Diagnosis   • Iron deficiency anemia   • Malignant neoplasm of female breast   • Rotator cuff insufficiency of right shoulder   • Ankle arthritis     Status Post: SC FUSION FOOT BONES,SUBTALAR [13687] (RIGHT ANKLE FUSION SUBTALAR FUSION POSSIBLE ACHILLES LENGTHENING POSSIBLE TALECTOMY)      Allergies   Allergen Reactions   • Aspirin Nausea And Vomiting   • Grass    • Morphine And Related Nausea And Vomiting   • Other      Trees   • Adhesive Tape Rash       Current Medications:   Renae Tidwell   Home Medication Instructions ROSEANNE:[id number]    Printed on:01/20/18 6385   Medication Information                      albuterol (PROVENTIL HFA;VENTOLIN HFA) 108 (90 BASE) MCG/ACT inhaler  Inhale 2 puffs Every 4 (Four) Hours As Needed.             aspirin  MG EC tablet  Take 1 tablet by mouth Daily.             carvedilol (COREG) 6.25 MG tablet  Take 6.25 mg by mouth 2 (two) times a day with meals.             Cholecalciferol (VITAMIN D-3 PO)  Take 1 tablet by mouth Daily. HOLD PRIOR TO SURG             dexlansoprazole (DEXILANT) 60 MG capsule  Take 60 mg by mouth Every Evening.             furosemide (LASIX) 20 MG tablet  Take 20 mg by mouth 1 (One) Time Per Week. ON SUNDAYS             HYDROcodone-acetaminophen (NORCO)  MG per tablet  Take 1 tablet by mouth Every 4 (Four) Hours As Needed for Moderate Pain  or Severe Pain  (1 tablet pain 3-6/10, 2 tablet pain 7-10/10).             levothyroxine (SYNTHROID, LEVOTHROID) 88 MCG tablet  Take 88 mcg by mouth Daily. DOES NOT TAKE ON Sunday\"S             misoprostol (CYTOTEC) 200 MCG tablet  Take 200 mcg by mouth 2 (two) times a day.             mometasone (NASONEX) 50 " MCG/ACT nasal spray  2 sprays into each nostril as needed.             Multiple Vitamins-Minerals (OCUVITE PO)  Take 1 tablet by mouth Daily. HOLD PRIOR TO SURG             potassium chloride (K-DUR) 10 MEQ CR tablet  Take 10 mEq by mouth 1 (One) Time Per Week.             raNITIdine (ZANTAC) 300 MG tablet  Take 300 mg by mouth Every Night.             sertraline (ZOLOFT) 100 MG tablet  Take 100 mg by mouth every evening.             tolterodine LA (DETROL LA) 4 MG 24 hr capsule  Take 4 mg by mouth Daily As Needed.             valsartan (DIOVAN) 160 MG tablet  Take 160 mg by mouth Every Evening.             zolpidem (AMBIEN) 10 MG tablet  Take 10 mg by mouth at night as needed for sleep.                     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   • Compression fracture 1970    At L4-L5   • DCIS (ductal carcinoma in situ) of breast     Of left breast post mastectomy   • Depression    • Esophageal ulceration    • GERD (gastroesophageal reflux disease)    • H/O seasonal allergies    • Hypertension    • Hypothyroidism    • MRSA (methicillin resistant Staphylococcus aureus) 05/2009    Of neck/TREATED AT [facility] BY PCP DR MARTELL LE   • Osteoporosis    • Thoracic outlet syndrome 1987   • Urinary incontinence     WEARS PAD     Past Surgical History:   Procedure Laterality Date   • BASAL CELL CARCINOMA EXCISION  2010    Nose   • BONE RESECTION  1987    Rib resections for thoracic outlet syndrome   • BREAST SURGERY      2012 right mastectomy and needle biopsy; 04/2013 left mastectomy for DCIS   • CHOLECYSTECTOMY  2003   • EYE SURGERY  2006    Bilateral cataract removal   •  HYSTERECTOMY  1987    complete   • KNEE ARTHROSCOPY  2006    Right knee   • MASTECTOMY Right 2012    And needle biopsy   • NASAL SEPTUM SURGERY  1968   • OOPHORECTOMY  1987   • OTHER SURGICAL HISTORY  12/2008    Lumbar forminal and central stenosis requiring surgery   • PLANTAR'S WART EXCISION  2003   • ROTATOR CUFF REPAIR  2007    Right side   • TENDON REPAIR  05/2011    Of right foot   • TONSILLECTOMY  1952   • TOTAL KNEE ARTHROPLASTY Left 2004   • TOTAL SHOULDER ARTHROPLASTY W/ DISTAL CLAVICLE EXCISION Right 6/22/2017    Procedure: RIGHT TOTAL SHOULDER REVERSE ARTHROPLASTY W/ AUGMENT ;  Surgeon: Kofi Rivas MD;  Location: [facility] OR [facility];  Service:    • WRIST SURGERY  1970's    Four surgeries for ganglion cyst     Social History     Occupational History   •  [employer]     Works in [employer] care     Social History Main Topics   • Smoking status: Never Smoker   • Smokeless tobacco: Never Used   • Alcohol use Yes      Comment: Rare   • Drug use: No   • Sexual activity: Not on file      Social History     Social History Narrative     Family History   Problem Relation Age of Onset   • No Known Problems Mother    • Lung cancer Father 62   • Lung cancer Sister    • Melanoma Brother    • Cancer Sister      Unknown cancer   • Malig Hyperthermia Neg Hx          Physical Exam: 82 y.o. female  General Appearance:    Alert, cooperative, in no acute distress                      Vitals:    01/19/18 2107 01/19/18 2210 01/20/18 0300 01/20/18 0721   BP: 126/74 122/70 114/57 96/55   BP Location: Right arm Right arm Left arm Right arm   Patient Position: Lying Lying Lying Lying   Pulse: 86 78 74 71   Resp: 16 16 16 16   Temp: 98 °F (36.7 °C) 97.4 °F (36.3 °C) 98.5 °F (36.9 °C) 96.9 °F (36.1 °C)   TempSrc: Oral Oral  Oral   SpO2: 100% 100% 98% 99%   Weight:       Height:                On the day of discharge: PHYSICAL EXAM  Resting in NAD  Splint clean, dry, intact  Toes warm, perfused, brisk capillary  refill  Flexes/extends toes  Minimal sensation 2/2 block  No pain with passive stretch                                                                   Hospital Course:  82 y.o. female admitted to [facility] to services of Richardson Summers Jr., MD with Ankle arthritis [M19.079] on 1/19/2018 and underwent SD FUSION FOOT BONES,SUBTALAR [56067] (RIGHT ANKLE FUSION SUBTALAR FUSION POSSIBLE ACHILLES LENGTHENING POSSIBLE TALECTOMY)  Per Richardson Summers Jr., MD. Antibiotic prophylaxis were per SCIP protocol.  For full details regarding this procedure, please refer to the operative note.    The patient tolerated this procedure well and was transferred to the floor postoperatively.  Pain was initially controlled with oral pain medication with IV for breakthrough. Aspirin 325mg daily was initiated for DVT propylaxis.  The patient underwent mobilization therapy with physical therapy remaining NWB on the operative extremity.  Opioids were titrated to achieve appropriate pain management to allow for participation in mobilization exercises. Vital signs are now stable. The splint remains intact without signs or symptoms of infection. Operative extremity neurovascular status remains intact. No transfusions of blood products were necessary postoperatively.    Appropriate education re: incision care, activity levels, medications, and follow up visits was completed and all questions were answered.  The patient was tolerating a regular diet, pain was controlled with oral pain medication, and cleared by physical therapy.    The patient is now deemed stable for discharge to a rehab facility.      DIAGNOSTIC TESTS:     No visits with results within 2 Day(s) from this visit.  Latest known visit with results is:    Admission on 06/22/2017, Discharged on 06/23/2017   Component Date Value Ref Range Status   • Product Code 06/23/2017 [id number]   Final   • Unit Number 06/23/2017 [id number]   Final   • UNIT  ABO 06/23/2017 O   Final   • UNIT   RH 06/23/2017 POS   Final   • CROSSMATCH 1 INTERPRETATION 06/23/2017 Compatible   Final   • Dispense Status 06/23/2017 RE   Final   • Blood Type 06/23/2017 OPOS   Final   • Blood Expiration Date 06/23/2017 201707122359   Final   • Blood Type Barcode 06/23/2017 5100   Final   • Product Code 06/23/2017 [id number]   Final   • Unit Number 06/23/2017 [id number]   Final   • UNIT  ABO 06/23/2017 O   Final   • UNIT  RH 06/23/2017 POS   Final   • CROSSMATCH 1 INTERPRETATION 06/23/2017 Compatible   Final   • Dispense Status 06/23/2017 RE   Final   • Blood Type 06/23/2017 OPOS   Final   • Blood Expiration Date 06/23/2017 201707122359   Final   • Blood Type Barcode 06/23/2017 5100   Final   • Product Code 06/23/2017 [id number]   Final   • Unit Number 06/23/2017 [id number]   Final   • UNIT  ABO 06/23/2017 O   Final   • UNIT  RH 06/23/2017 POS   Final   • CROSSMATCH 1 INTERPRETATION 06/23/2017 Compatible   Final   • Dispense Status 06/23/2017 RE   Final   • Blood Type 06/23/2017 OPOS   Final   • Blood Expiration Date 06/23/2017 201706242359   Final   • Blood Type Barcode 06/23/2017 5100   Final   • Product Code 06/23/2017 [id number]   Final   • Unit Number 06/23/2017 [id number]   Final   • UNIT  ABO 06/23/2017 O   Final   • UNIT  RH 06/23/2017 POS   Final   • CROSSMATCH 1 INTERPRETATION 06/23/2017 Compatible   Final   • Dispense Status 06/23/2017 RE   Final   • Blood Type 06/23/2017 OPOS   Final   • Blood Expiration Date 06/23/2017 201707172359   Final   • Blood Type Barcode 06/23/2017 5100   Final   • Product Code 06/23/2017 [id number]   Final   • Unit Number 06/23/2017 [id number]   Final   • UNIT  ABO 06/23/2017 O   Final   • UNIT  RH 06/23/2017 POS   Final   • CROSSMATCH 1 INTERPRETATION 06/23/2017 Compatible   Final   • Dispense Status 06/23/2017 RE   Final   • Blood Type 06/23/2017 OPOS   Final   • Blood Expiration Date 06/23/2017 081102379014   Final   • Blood Type Barcode 06/23/2017 5100   Final   • Product  Code 06/23/2017 [id number]   Final   • Unit Number 06/23/2017 [id number]   Final   • UNIT  ABO 06/23/2017 O   Final   • UNIT  RH 06/23/2017 POS   Final   • CROSSMATCH 1 INTERPRETATION 06/23/2017 Compatible   Final   • Dispense Status 06/23/2017 RE   Final   • Blood Type 06/23/2017 OPOS   Final   • Blood Expiration Date 06/23/2017 [id number]   Final   • Blood Type Barcode 06/23/2017 5100   Final   • Glucose 06/23/2017 123* 65 - 99 mg/dL Final   • BUN 06/23/2017 10  8 - 23 mg/dL Final   • Creatinine 06/23/2017 0.68  0.57 - 1.00 mg/dL Final   • Sodium 06/23/2017 133* 136 - 145 mmol/L Final   • Potassium 06/23/2017 3.9  3.5 - 5.2 mmol/L Final   • Chloride 06/23/2017 97* 98 - 107 mmol/L Final   • CO2 06/23/2017 23.0  22.0 - 29.0 mmol/L Final   • Calcium 06/23/2017 8.1* 8.6 - 10.5 mg/dL Final   • eGFR Non  Amer 06/23/2017 83  >60 mL/min/1.73 Final   • BUN/Creatinine Ratio 06/23/2017 14.7  7.0 - 25.0 Final   • Anion Gap 06/23/2017 13.0  mmol/L Final   • Hemoglobin 06/23/2017 9.7* 11.9 - 15.5 g/dL Final   • Hematocrit 06/23/2017 29.5* 35.6 - 45.5 % Final       Xr Ankle 3+ View Right    Result Date: 1/19/2018  Narrative: RIGHT ANKLE  HISTORY: Right ankle fusion.  RIGHT ANKLE: Eight intraoperative views.  FINDINGS: Imaging demonstrates subtalar and tibiotalar fusion with retrograde placement of a nail transfixing the subtalar and tibiotalar joints. Cannulated screws transfix the calcaneus and there is also fusion device overlying the anterior talus. Exam performed for intraoperative localization and control.  FLUOROSCOPY TIME:   Five minutes 45 seconds.  This report was finalized on 1/19/2018 7:18 PM by Dr. Caden Xiao MD.      Fl C Arm During Surgery    Result Date: 1/19/2018  Narrative: This procedure was auto-finalized with no dictation required.      Discharge and Follow up Instructions:     Remain NWB on the operative extremity.    Keep splint clean and dry at all times.    Elevate the extremity as much  "as possible (\"toes above the level of the nose\").    Try to get up at least once per hour while awake to help prevent blood clots.    Follow up in 2-3 weeks with Dr. Summers at [facility] ([phone]).    Date: 1/20/2018    Richardson Summers Jr, MD        "

## 2018-01-20 NOTE — PLAN OF CARE
Problem: Inpatient Physical Therapy  Goal: Transfer Training Goal 1 LTG- PT   01/20/18 0914   Transfer Training PT LTG   Transfer Training PT LTG, Date Established 01/20/18   Transfer Training PT LTG, Time to Achieve 1 wk   Transfer Training PT LTG, Activity Type all transfers   Transfer Training PT LTG, [place] Level contact guard assist   Transfer Training PT LTG, Assist Device walker, rolling  (OBEY NWB PRECAUTIONS. )

## 2018-01-21 PROCEDURE — 94799 UNLISTED PULMONARY SVC/PX: CPT

## 2018-01-21 RX ADMIN — BACLOFEN 10 MG: 10 TABLET ORAL at 20:31

## 2018-01-21 RX ADMIN — MISOPROSTOL 200 MCG: 200 TABLET ORAL at 09:18

## 2018-01-21 RX ADMIN — HYDROCODONE BITARTRATE AND ACETAMINOPHEN 2 TABLET: 10; 325 TABLET ORAL at 03:46

## 2018-01-21 RX ADMIN — BACLOFEN 10 MG: 10 TABLET ORAL at 03:50

## 2018-01-21 RX ADMIN — PANTOPRAZOLE SODIUM 40 MG: 40 TABLET, DELAYED RELEASE ORAL at 06:11

## 2018-01-21 RX ADMIN — CARVEDILOL 6.25 MG: 6.25 TABLET, FILM COATED ORAL at 17:33

## 2018-01-21 RX ADMIN — ASPIRIN 325 MG: 325 TABLET, COATED ORAL at 09:18

## 2018-01-21 RX ADMIN — BACLOFEN 10 MG: 10 TABLET ORAL at 14:37

## 2018-01-21 RX ADMIN — CARVEDILOL 6.25 MG: 6.25 TABLET, FILM COATED ORAL at 09:18

## 2018-01-21 RX ADMIN — POTASSIUM CHLORIDE 10 MEQ: 750 CAPSULE, EXTENDED RELEASE ORAL at 09:18

## 2018-01-21 RX ADMIN — FLUTICASONE PROPIONATE 2 SPRAY: 50 SPRAY, METERED NASAL at 09:20

## 2018-01-21 RX ADMIN — HYDROCODONE BITARTRATE AND ACETAMINOPHEN 1 TABLET: 10; 325 TABLET ORAL at 16:23

## 2018-01-21 RX ADMIN — BACLOFEN 10 MG: 10 TABLET ORAL at 09:18

## 2018-01-21 RX ADMIN — HYDROCODONE BITARTRATE AND ACETAMINOPHEN 1 TABLET: 10; 325 TABLET ORAL at 11:51

## 2018-01-21 RX ADMIN — HYDROCODONE BITARTRATE AND ACETAMINOPHEN 1 TABLET: 10; 325 TABLET ORAL at 17:32

## 2018-01-21 RX ADMIN — FAMOTIDINE 20 MG: 20 TABLET, FILM COATED ORAL at 09:18

## 2018-01-21 RX ADMIN — OXYBUTYNIN CHLORIDE 5 MG: 5 TABLET, EXTENDED RELEASE ORAL at 11:50

## 2018-01-21 RX ADMIN — VALSARTAN 160 MG: 160 TABLET ORAL at 16:39

## 2018-01-21 RX ADMIN — FAMOTIDINE 20 MG: 20 TABLET, FILM COATED ORAL at 21:05

## 2018-01-21 RX ADMIN — DOCUSATE SODIUM -SENNOSIDES 2 TABLET: 50; 8.6 TABLET, COATED ORAL at 21:05

## 2018-01-21 RX ADMIN — SERTRALINE 100 MG: 100 TABLET, FILM COATED ORAL at 16:39

## 2018-01-21 RX ADMIN — HYDROCODONE BITARTRATE AND ACETAMINOPHEN 1 TABLET: 10; 325 TABLET ORAL at 12:34

## 2018-01-21 RX ADMIN — HYDROCODONE BITARTRATE AND ACETAMINOPHEN 2 TABLET: 10; 325 TABLET ORAL at 07:48

## 2018-01-21 RX ADMIN — HYDROCODONE BITARTRATE AND ACETAMINOPHEN 2 TABLET: 10; 325 TABLET ORAL at 21:42

## 2018-01-21 NOTE — PROGRESS NOTES
I have seen and evaluated the patient for Dr. Summers.  Patient reports feeling comfortable.  Denies any new problems or issues.    Vitals:    01/20/18 2242 01/21/18 0300 01/21/18 0700 01/21/18 1100   BP: 141/62 128/57 134/75 116/67   BP Location: Right arm Right arm Right arm Right arm   Patient Position: Lying Lying Lying Sitting   Pulse: 75 70 84 71   Resp: 16 16 16 16   Temp: 99.3 °F (37.4 °C) 99.2 °F (37.3 °C) 98.7 °F (37.1 °C) 98.3 °F (36.8 °C)   TempSrc: Oral Oral Oral Oral   SpO2: 100% 98% 99% 97%   Weight:       Height:         Her splint is clean, dry, and intact.  Toes are pink and warm with brisk cap refill.  She can wiggle her toes normally and has intact sensation.    Plan is to continue pain control and observation.  She will likely go to rehabilitation tomorrow.    Kofi Bauer MD

## 2018-01-21 NOTE — SIGNIFICANT NOTE
01/21/18 1118   Rehab Treatment   Discipline physical therapist   Treatment Not Performed patient/family declined treatment  (Just up to BSC and then to bedside chair with assist of 2 prior to PT arrival  and requests not now for PT.  Will check back later today as able)

## 2018-01-21 NOTE — SIGNIFICANT NOTE
01/21/18 1549   Rehab Treatment   Discipline physical therapist   Treatment Not Performed patient/family declined treatment  (2nd attempt today , patient not up to it this pm.  States going to rehab and has good therapy dept. there.)   Recommendation   PT - Next Appointment 01/22/18

## 2018-01-21 NOTE — PLAN OF CARE
Problem: Patient Care Overview (Adult)  Goal: Plan of Care Review  Outcome: Ongoing (interventions implemented as appropriate)   01/21/18 0240   Coping/Psychosocial Response Interventions   Plan Of Care Reviewed With patient   Outcome Evaluation   Outcome Summary/Follow up Plan baclofen ordered for back spasms, po pain meds effective, some swelling to right ankle, discussed monitoring of blood pressure at home.     Goal: Adult Individualization and Mutuality  Outcome: Ongoing (interventions implemented as appropriate)    Goal: Discharge Needs Assessment  Outcome: Ongoing (interventions implemented as appropriate)      Problem: Perioperative Period (Adult)  Goal: Signs and Symptoms of Listed Potential Problems Will be Absent or Manageable (Perioperative Period)  Outcome: Ongoing (interventions implemented as appropriate)      Problem: Fall Risk (Adult)  Goal: Absence of Falls  Outcome: Ongoing (interventions implemented as appropriate)

## 2018-01-21 NOTE — PLAN OF CARE
Problem: Patient Care Overview (Adult)  Goal: Plan of Care Review  Outcome: Ongoing (interventions implemented as appropriate)   01/20/18 1950   Coping/Psychosocial Response Interventions   Plan Of Care Reviewed With patient   Patient Care Overview   Progress progress towards functional goals is fair   Outcome Evaluation   Outcome Summary/Follow up Plan Pt pod 1 right ankle fusion. The patient complains of pain and spasms in her back r/t history of back surgeries. Pt states that all of her toes bilaterally are baseline numbness, she is able to move toes, and they are warm to touch. Pt refused to get up from bed to sit in chair or use BSC. Pain controlled with PO meds and one dose of IV dilaudid given on this shift. Will continue to monitor hemoglobin r/t history of anemia.     Goal: Adult Individualization and Mutuality  Outcome: Ongoing (interventions implemented as appropriate)    Goal: Discharge Needs Assessment  Outcome: Ongoing (interventions implemented as appropriate)      Problem: Perioperative Period (Adult)  Goal: Signs and Symptoms of Listed Potential Problems Will be Absent or Manageable (Perioperative Period)  Outcome: Ongoing (interventions implemented as appropriate)      Problem: Fall Risk (Adult)  Goal: Absence of Falls  Outcome: Ongoing (interventions implemented as appropriate)

## 2018-01-22 VITALS
TEMPERATURE: 100.5 F | HEART RATE: 82 BPM | DIASTOLIC BLOOD PRESSURE: 73 MMHG | BODY MASS INDEX: 35.25 KG/M2 | OXYGEN SATURATION: 100 % | RESPIRATION RATE: 16 BRPM | WEIGHT: 219.31 LBS | SYSTOLIC BLOOD PRESSURE: 145 MMHG | HEIGHT: 66 IN

## 2018-01-22 RX ORDER — SCOLOPAMINE TRANSDERMAL SYSTEM 1 MG/1
1 PATCH, EXTENDED RELEASE TRANSDERMAL
Status: DISCONTINUED | OUTPATIENT
Start: 2018-01-22 | End: 2018-01-22 | Stop reason: HOSPADM

## 2018-01-22 RX ORDER — ECHINACEA PURPUREA EXTRACT 125 MG
2 TABLET ORAL AS NEEDED
Status: DISCONTINUED | OUTPATIENT
Start: 2018-01-22 | End: 2018-01-22 | Stop reason: HOSPADM

## 2018-01-22 RX ORDER — GUAIFENESIN AND DEXTROMETHORPHAN HYDROBROMIDE 600; 30 MG/1; MG/1
1 TABLET, EXTENDED RELEASE ORAL 2 TIMES DAILY PRN
Status: DISCONTINUED | OUTPATIENT
Start: 2018-01-22 | End: 2018-01-22 | Stop reason: HOSPADM

## 2018-01-22 RX ADMIN — BACLOFEN 10 MG: 10 TABLET ORAL at 08:08

## 2018-01-22 RX ADMIN — HYDROCODONE BITARTRATE AND ACETAMINOPHEN 2 TABLET: 10; 325 TABLET ORAL at 08:08

## 2018-01-22 RX ADMIN — FAMOTIDINE 20 MG: 20 TABLET, FILM COATED ORAL at 08:08

## 2018-01-22 RX ADMIN — LEVOTHYROXINE SODIUM 88 MCG: 88 TABLET ORAL at 08:08

## 2018-01-22 RX ADMIN — ASPIRIN 325 MG: 325 TABLET, COATED ORAL at 08:08

## 2018-01-22 RX ADMIN — BACLOFEN 10 MG: 10 TABLET ORAL at 02:12

## 2018-01-22 RX ADMIN — OXYBUTYNIN CHLORIDE 5 MG: 5 TABLET, EXTENDED RELEASE ORAL at 08:08

## 2018-01-22 RX ADMIN — HYDROCODONE BITARTRATE AND ACETAMINOPHEN 2 TABLET: 10; 325 TABLET ORAL at 11:38

## 2018-01-22 RX ADMIN — MISOPROSTOL 200 MCG: 200 TABLET ORAL at 08:08

## 2018-01-22 RX ADMIN — SCOPOLAMINE 1 PATCH: 1 PATCH, EXTENDED RELEASE TRANSDERMAL at 10:18

## 2018-01-22 RX ADMIN — FLUTICASONE PROPIONATE 2 SPRAY: 50 SPRAY, METERED NASAL at 08:08

## 2018-01-22 RX ADMIN — PANTOPRAZOLE SODIUM 40 MG: 40 TABLET, DELAYED RELEASE ORAL at 06:26

## 2018-01-22 RX ADMIN — CARVEDILOL 6.25 MG: 6.25 TABLET, FILM COATED ORAL at 08:08

## 2018-01-22 RX ADMIN — POTASSIUM CHLORIDE 10 MEQ: 750 CAPSULE, EXTENDED RELEASE ORAL at 08:08

## 2018-01-22 NOTE — PROGRESS NOTES
Case Management Discharge Note    Final Note: D/C to [facility] for ANTONIA via [facility] (Ayde Lockwood at [facility] notified and report called).     Discharge Placement     Facility/Agency Request Status Selected? Address Phone Number Fax Number    [facility] Accepted    Yes [address], [place] [address] [phone]         Geraldine Salazar RN 1/20/2018 14:15    Spoke with MICHELLE Rodriguez and pt is scheduled to be admitted on Monday                   [facility]    Discharge Codes: 03  Discharged/transferred to skilled nursing facility (SNF) with Medicare certification in anticipation of skilled care

## 2018-01-22 NOTE — PROGRESS NOTES
"Orthopaedic Foot and Ankle Surgery  Daily Progress Note    /71 (BP Location: Right arm, Patient Position: Lying)  Pulse 84  Temp 97.3 °F (36.3 °C) (Oral)   Resp 22  Ht 167.6 cm (66\")  Wt 99.5 kg (219 lb 5 oz)  LMP Comment: hysterctomy  SpO2 94%  BMI 35.4 kg/m2      Imaging Results (last 24 hours)     ** No results found for the last 24 hours. **          Patient Care Team:  Anne Brooks MD as PCP - General (Internal Medicine)  Corey Pearson MD as Consulting Physician (Hematology and Oncology)  Nate Knapp MD as Referring Physician (Breast Surgery)    SUBJECTIVE  Pain controlled    PHYSICAL EXAM  Resting in NAD  Splint clean, dry, intact  Toes warm, perfused, brisk capillary refill  Flexes/extends toes  SILT over toes  No pain with passive stretch     Active Problems:    Ankle arthritis      PLAN / DISPOSITION:  POD 3 s/p R TTC, doing well  1. Pain control: Orals plus IV for breakthrough  2.  Antibiotics: Periop Ancef complete  3.  PT: NWB RLE  4. DVT: ASA 325mg plus PRINCESS/SCD, mobilization  5. Dispo: to rehab today, follow up 2-3 weeks with me at [facility] ([phone] to make appointment)    Richardson Summers Jr, MD  01/22/18  7:38 AM    "

## 2018-01-22 NOTE — PLAN OF CARE
Problem: Patient Care Overview (Adult)  Goal: Plan of Care Review  Outcome: Ongoing (interventions implemented as appropriate)   01/21/18 1940   Coping/Psychosocial Response Interventions   Plan Of Care Reviewed With patient   Patient Care Overview   Progress improving   Outcome Evaluation   Outcome Summary/Follow up Plan VSS, pt c/o pain throughout shift-PO pain meds did help, toes have good neurovascular checks and color, pt used BSC today-PT's 1st visit was during that time and said they would try to come back, they came back and patient refused-I informed pt she needs to be seen by our PT-she stated where she is going has good PT, pt uses [facility] [phone], plan is to be d/c'd tomorrow to [facility] (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      Goal: Adult Individualization and Mutuality  Outcome: Ongoing (interventions implemented as appropriate)      Problem: Perioperative Period (Adult)  Goal: Signs and Symptoms of Listed Potential Problems Will be Absent or Manageable (Perioperative Period)  Outcome: Ongoing (interventions implemented as appropriate)      Problem: Fall Risk (Adult)  Goal: Absence of Falls  Outcome: Ongoing (interventions implemented as appropriate)

## 2018-02-08 ENCOUNTER — HOSPITAL ENCOUNTER (INPATIENT)
Facility: HOSPITAL | Age: 83
LOS: 2 days | Discharge: NURSING, REHAB OR LONG-TERM CARE | End: 2018-02-12
Attending: ORTHOPAEDIC SURGERY | Admitting: ORTHOPAEDIC SURGERY

## 2018-02-08 ENCOUNTER — HOSPITAL ENCOUNTER (OUTPATIENT)
Facility: HOSPITAL | Age: 83
Setting detail: SURGERY ADMIT
End: 2018-02-08
Attending: ORTHOPAEDIC SURGERY | Admitting: ORTHOPAEDIC SURGERY

## 2018-02-08 DIAGNOSIS — T81.40XA POST OP INFECTION: ICD-10-CM

## 2018-02-08 DIAGNOSIS — Z74.09 IMPAIRED MOBILITY: Primary | ICD-10-CM

## 2018-02-08 LAB
ABO GROUP BLD: NORMAL
ANION GAP SERPL CALCULATED.3IONS-SCNC: 10.4 MMOL/L
ANTI-E: NORMAL
APTT PPP: 28.4 SECONDS (ref 22.7–35.4)
BLD GP AB SCN SERPL QL: NEGATIVE
BUN BLD-MCNC: 18 MG/DL (ref 8–23)
BUN/CREAT SERPL: 23.7 (ref 7–25)
CALCIUM SPEC-SCNC: 8.7 MG/DL (ref 8.6–10.5)
CHLORIDE SERPL-SCNC: 94 MMOL/L (ref 98–107)
CO2 SERPL-SCNC: 27.6 MMOL/L (ref 22–29)
CREAT BLD-MCNC: 0.76 MG/DL (ref 0.57–1)
CRP SERPL-MCNC: 2.65 MG/DL (ref 0–0.5)
DEPRECATED RDW RBC AUTO: 46.9 FL (ref 37–54)
ERYTHROCYTE [DISTWIDTH] IN BLOOD BY AUTOMATED COUNT: 13.6 % (ref 11.7–13)
ERYTHROCYTE [SEDIMENTATION RATE] IN BLOOD: 72 MM/HR (ref 0–30)
GFR SERPL CREATININE-BSD FRML MDRD: 73 ML/MIN/1.73
GLUCOSE BLD-MCNC: 112 MG/DL (ref 65–99)
HCT VFR BLD AUTO: 30.4 % (ref 35.6–45.5)
HGB BLD-MCNC: 9.6 G/DL (ref 11.9–15.5)
INR PPP: 1.24 (ref 0.9–1.1)
MCH RBC QN AUTO: 30.2 PG (ref 26.9–32)
MCHC RBC AUTO-ENTMCNC: 31.6 G/DL (ref 32.4–36.3)
MCV RBC AUTO: 95.6 FL (ref 80.5–98.2)
PLATELET # BLD AUTO: 370 10*3/MM3 (ref 140–500)
PMV BLD AUTO: 9.1 FL (ref 6–12)
POTASSIUM BLD-SCNC: 4.3 MMOL/L (ref 3.5–5.2)
PROTHROMBIN TIME: 15.4 SECONDS (ref 11.7–14.2)
RBC # BLD AUTO: 3.18 10*6/MM3 (ref 3.9–5.2)
RH BLD: POSITIVE
SODIUM BLD-SCNC: 132 MMOL/L (ref 136–145)
WBC NRBC COR # BLD: 8.67 10*3/MM3 (ref 4.5–10.7)

## 2018-02-08 PROCEDURE — 86922 COMPATIBILITY TEST ANTIGLOB: CPT

## 2018-02-08 PROCEDURE — 86850 RBC ANTIBODY SCREEN: CPT | Performed by: ORTHOPAEDIC SURGERY

## 2018-02-08 PROCEDURE — 86901 BLOOD TYPING SEROLOGIC RH(D): CPT | Performed by: ORTHOPAEDIC SURGERY

## 2018-02-08 PROCEDURE — 85027 COMPLETE CBC AUTOMATED: CPT | Performed by: ORTHOPAEDIC SURGERY

## 2018-02-08 PROCEDURE — 86920 COMPATIBILITY TEST SPIN: CPT

## 2018-02-08 PROCEDURE — 86900 BLOOD TYPING SEROLOGIC ABO: CPT | Performed by: ORTHOPAEDIC SURGERY

## 2018-02-08 PROCEDURE — 86140 C-REACTIVE PROTEIN: CPT | Performed by: ORTHOPAEDIC SURGERY

## 2018-02-08 PROCEDURE — 85610 PROTHROMBIN TIME: CPT | Performed by: ORTHOPAEDIC SURGERY

## 2018-02-08 PROCEDURE — 86870 RBC ANTIBODY IDENTIFICATION: CPT | Performed by: ORTHOPAEDIC SURGERY

## 2018-02-08 PROCEDURE — 80048 BASIC METABOLIC PNL TOTAL CA: CPT | Performed by: ORTHOPAEDIC SURGERY

## 2018-02-08 PROCEDURE — 85652 RBC SED RATE AUTOMATED: CPT | Performed by: ORTHOPAEDIC SURGERY

## 2018-02-08 PROCEDURE — 85730 THROMBOPLASTIN TIME PARTIAL: CPT | Performed by: ORTHOPAEDIC SURGERY

## 2018-02-08 RX ORDER — NALOXONE HCL 0.4 MG/ML
0.4 VIAL (ML) INJECTION
Status: DISCONTINUED | OUTPATIENT
Start: 2018-02-08 | End: 2018-02-13 | Stop reason: HOSPADM

## 2018-02-08 RX ORDER — FLUTICASONE PROPIONATE 50 MCG
2 SPRAY, SUSPENSION (ML) NASAL DAILY
Status: DISCONTINUED | OUTPATIENT
Start: 2018-02-08 | End: 2018-02-13 | Stop reason: HOSPADM

## 2018-02-08 RX ORDER — HYDROCODONE BITARTRATE AND ACETAMINOPHEN 10; 325 MG/1; MG/1
1 TABLET ORAL EVERY 4 HOURS PRN
Status: DISCONTINUED | OUTPATIENT
Start: 2018-02-08 | End: 2018-02-09

## 2018-02-08 RX ORDER — VALSARTAN 160 MG/1
160 TABLET ORAL EVERY EVENING
Status: DISCONTINUED | OUTPATIENT
Start: 2018-02-08 | End: 2018-02-13 | Stop reason: HOSPADM

## 2018-02-08 RX ORDER — LEVOTHYROXINE SODIUM 88 UG/1
88 TABLET ORAL DAILY
Status: DISCONTINUED | OUTPATIENT
Start: 2018-02-08 | End: 2018-02-08

## 2018-02-08 RX ORDER — ONDANSETRON 2 MG/ML
4 INJECTION INTRAMUSCULAR; INTRAVENOUS EVERY 6 HOURS PRN
Status: DISCONTINUED | OUTPATIENT
Start: 2018-02-08 | End: 2018-02-13 | Stop reason: HOSPADM

## 2018-02-08 RX ORDER — FUROSEMIDE 20 MG/1
20 TABLET ORAL WEEKLY
Status: DISCONTINUED | OUTPATIENT
Start: 2018-02-11 | End: 2018-02-13 | Stop reason: HOSPADM

## 2018-02-08 RX ORDER — MORPHINE SULFATE 2 MG/ML
1 INJECTION, SOLUTION INTRAMUSCULAR; INTRAVENOUS EVERY 4 HOURS PRN
Status: DISCONTINUED | OUTPATIENT
Start: 2018-02-08 | End: 2018-02-13 | Stop reason: HOSPADM

## 2018-02-08 RX ORDER — SENNA AND DOCUSATE SODIUM 50; 8.6 MG/1; MG/1
2 TABLET, FILM COATED ORAL NIGHTLY
Status: DISCONTINUED | OUTPATIENT
Start: 2018-02-08 | End: 2018-02-13 | Stop reason: HOSPADM

## 2018-02-08 RX ORDER — SERTRALINE HYDROCHLORIDE 100 MG/1
100 TABLET, FILM COATED ORAL EVERY EVENING
Status: DISCONTINUED | OUTPATIENT
Start: 2018-02-08 | End: 2018-02-13 | Stop reason: HOSPADM

## 2018-02-08 RX ORDER — PANTOPRAZOLE SODIUM 40 MG/1
40 TABLET, DELAYED RELEASE ORAL EVERY MORNING
Status: DISCONTINUED | OUTPATIENT
Start: 2018-02-09 | End: 2018-02-13 | Stop reason: HOSPADM

## 2018-02-08 RX ORDER — OXYBUTYNIN CHLORIDE 5 MG/1
5 TABLET, EXTENDED RELEASE ORAL DAILY
Status: DISCONTINUED | OUTPATIENT
Start: 2018-02-08 | End: 2018-02-13 | Stop reason: HOSPADM

## 2018-02-08 RX ORDER — FAMOTIDINE 20 MG/1
20 TABLET, FILM COATED ORAL DAILY
Status: DISCONTINUED | OUTPATIENT
Start: 2018-02-08 | End: 2018-02-13 | Stop reason: HOSPADM

## 2018-02-08 RX ORDER — POTASSIUM CHLORIDE 750 MG/1
10 CAPSULE, EXTENDED RELEASE ORAL DAILY
Status: DISCONTINUED | OUTPATIENT
Start: 2018-02-08 | End: 2018-02-13 | Stop reason: HOSPADM

## 2018-02-08 RX ORDER — CARVEDILOL 6.25 MG/1
6.25 TABLET ORAL 2 TIMES DAILY WITH MEALS
Status: DISCONTINUED | OUTPATIENT
Start: 2018-02-08 | End: 2018-02-13 | Stop reason: HOSPADM

## 2018-02-08 RX ORDER — LEVOTHYROXINE SODIUM 88 UG/1
88 TABLET ORAL DAILY
Status: DISCONTINUED | OUTPATIENT
Start: 2018-02-09 | End: 2018-02-13 | Stop reason: HOSPADM

## 2018-02-08 RX ORDER — MISOPROSTOL 200 UG/1
200 TABLET ORAL 2 TIMES DAILY
Status: DISCONTINUED | OUTPATIENT
Start: 2018-02-08 | End: 2018-02-13 | Stop reason: HOSPADM

## 2018-02-08 RX ORDER — ALBUTEROL SULFATE 2.5 MG/3ML
2.5 SOLUTION RESPIRATORY (INHALATION) EVERY 6 HOURS PRN
Status: DISCONTINUED | OUTPATIENT
Start: 2018-02-08 | End: 2018-02-13 | Stop reason: HOSPADM

## 2018-02-08 RX ORDER — SODIUM CHLORIDE 0.9 % (FLUSH) 0.9 %
1-10 SYRINGE (ML) INJECTION AS NEEDED
Status: DISCONTINUED | OUTPATIENT
Start: 2018-02-08 | End: 2018-02-13 | Stop reason: HOSPADM

## 2018-02-08 RX ORDER — ZOLPIDEM TARTRATE 5 MG/1
10 TABLET ORAL NIGHTLY PRN
Status: DISCONTINUED | OUTPATIENT
Start: 2018-02-08 | End: 2018-02-13 | Stop reason: HOSPADM

## 2018-02-08 RX ADMIN — SERTRALINE 100 MG: 100 TABLET, FILM COATED ORAL at 20:18

## 2018-02-08 RX ADMIN — HYDROCODONE BITARTRATE AND ACETAMINOPHEN 1 TABLET: 10; 325 TABLET ORAL at 20:20

## 2018-02-08 RX ADMIN — VALSARTAN 160 MG: 160 TABLET ORAL at 20:19

## 2018-02-08 RX ADMIN — MISOPROSTOL 200 MCG: 200 TABLET ORAL at 21:33

## 2018-02-08 RX ADMIN — DOCUSATE SODIUM -SENNOSIDES 2 TABLET: 50; 8.6 TABLET, COATED ORAL at 20:18

## 2018-02-08 RX ADMIN — FAMOTIDINE 20 MG: 20 TABLET, FILM COATED ORAL at 20:18

## 2018-02-08 RX ADMIN — CARVEDILOL 6.25 MG: 6.25 TABLET, FILM COATED ORAL at 20:19

## 2018-02-09 ENCOUNTER — ANESTHESIA EVENT (OUTPATIENT)
Dept: PERIOP | Facility: HOSPITAL | Age: 83
End: 2018-02-09

## 2018-02-09 ENCOUNTER — ANESTHESIA (OUTPATIENT)
Dept: PERIOP | Facility: HOSPITAL | Age: 83
End: 2018-02-09

## 2018-02-09 PROCEDURE — A9270 NON-COVERED ITEM OR SERVICE: HCPCS | Performed by: ORTHOPAEDIC SURGERY

## 2018-02-09 PROCEDURE — 25010000002 HYDROMORPHONE PER 4 MG

## 2018-02-09 PROCEDURE — 25010000002 VANCOMYCIN PER 500 MG: Performed by: ANESTHESIOLOGY

## 2018-02-09 PROCEDURE — 87176 TISSUE HOMOGENIZATION CULTR: CPT | Performed by: ORTHOPAEDIC SURGERY

## 2018-02-09 PROCEDURE — 63710000001 MISOPROSTOL 200 MCG TABLET: Performed by: ORTHOPAEDIC SURGERY

## 2018-02-09 PROCEDURE — 94799 UNLISTED PULMONARY SVC/PX: CPT

## 2018-02-09 PROCEDURE — 25010000002 PROPOFOL 10 MG/ML EMULSION: Performed by: ANESTHESIOLOGY

## 2018-02-09 PROCEDURE — 63710000001 SERTRALINE 100 MG TABLET: Performed by: ORTHOPAEDIC SURGERY

## 2018-02-09 PROCEDURE — 25010000002 FENTANYL CITRATE (PF) 100 MCG/2ML SOLUTION: Performed by: ANESTHESIOLOGY

## 2018-02-09 PROCEDURE — 0QBL0ZZ EXCISION OF RIGHT TARSAL, OPEN APPROACH: ICD-10-PCS | Performed by: ORTHOPAEDIC SURGERY

## 2018-02-09 PROCEDURE — 87205 SMEAR GRAM STAIN: CPT | Performed by: ORTHOPAEDIC SURGERY

## 2018-02-09 PROCEDURE — 25010000002 VANCOMYCIN PER 500 MG: Performed by: ORTHOPAEDIC SURGERY

## 2018-02-09 PROCEDURE — 25010000002 LEVOFLOXACIN PER 250 MG: Performed by: ANESTHESIOLOGY

## 2018-02-09 PROCEDURE — 25010000002 ONDANSETRON PER 1 MG: Performed by: ANESTHESIOLOGY

## 2018-02-09 PROCEDURE — 63710000001 SENNOSIDES-DOCUSATE SODIUM 8.6-50 MG TABLET: Performed by: ORTHOPAEDIC SURGERY

## 2018-02-09 PROCEDURE — 87070 CULTURE OTHR SPECIMN AEROBIC: CPT | Performed by: ORTHOPAEDIC SURGERY

## 2018-02-09 PROCEDURE — 25010000002 MIDAZOLAM PER 1 MG: Performed by: ANESTHESIOLOGY

## 2018-02-09 PROCEDURE — 63710000001 CARVEDILOL 6.25 MG TABLET: Performed by: ORTHOPAEDIC SURGERY

## 2018-02-09 PROCEDURE — 63710000001 VALSARTAN 160 MG TABLET: Performed by: ORTHOPAEDIC SURGERY

## 2018-02-09 PROCEDURE — 0J9Q0ZZ DRAINAGE OF RIGHT FOOT SUBCUTANEOUS TISSUE AND FASCIA, OPEN APPROACH: ICD-10-PCS | Performed by: ORTHOPAEDIC SURGERY

## 2018-02-09 PROCEDURE — 63710000001 HYDROCODONE-ACETAMINOPHEN 10-325 MG TABLET: Performed by: ORTHOPAEDIC SURGERY

## 2018-02-09 PROCEDURE — 25010000002 HYDROMORPHONE PER 4 MG: Performed by: ANESTHESIOLOGY

## 2018-02-09 RX ORDER — HYDROCODONE BITARTRATE AND ACETAMINOPHEN 10; 325 MG/1; MG/1
1 TABLET ORAL EVERY 4 HOURS PRN
Status: DISCONTINUED | OUTPATIENT
Start: 2018-02-09 | End: 2018-02-13 | Stop reason: HOSPADM

## 2018-02-09 RX ORDER — ECHINACEA PURPUREA EXTRACT 125 MG
1 TABLET ORAL AS NEEDED
Status: DISCONTINUED | OUTPATIENT
Start: 2018-02-09 | End: 2018-02-13 | Stop reason: HOSPADM

## 2018-02-09 RX ORDER — ONDANSETRON 2 MG/ML
4 INJECTION INTRAMUSCULAR; INTRAVENOUS ONCE AS NEEDED
Status: DISCONTINUED | OUTPATIENT
Start: 2018-02-09 | End: 2018-02-09 | Stop reason: HOSPADM

## 2018-02-09 RX ORDER — LIDOCAINE HYDROCHLORIDE 20 MG/ML
INJECTION, SOLUTION INFILTRATION; PERINEURAL AS NEEDED
Status: DISCONTINUED | OUTPATIENT
Start: 2018-02-09 | End: 2018-02-09 | Stop reason: SURG

## 2018-02-09 RX ORDER — LEVOFLOXACIN 5 MG/ML
INJECTION, SOLUTION INTRAVENOUS AS NEEDED
Status: DISCONTINUED | OUTPATIENT
Start: 2018-02-09 | End: 2018-02-09 | Stop reason: SURG

## 2018-02-09 RX ORDER — PROMETHAZINE HYDROCHLORIDE 25 MG/ML
12.5 INJECTION, SOLUTION INTRAMUSCULAR; INTRAVENOUS ONCE AS NEEDED
Status: DISCONTINUED | OUTPATIENT
Start: 2018-02-09 | End: 2018-02-09 | Stop reason: HOSPADM

## 2018-02-09 RX ORDER — LEVOFLOXACIN 5 MG/ML
750 INJECTION, SOLUTION INTRAVENOUS EVERY 24 HOURS
Status: DISCONTINUED | OUTPATIENT
Start: 2018-02-09 | End: 2018-02-09

## 2018-02-09 RX ORDER — FENTANYL CITRATE 50 UG/ML
50 INJECTION, SOLUTION INTRAMUSCULAR; INTRAVENOUS
Status: DISCONTINUED | OUTPATIENT
Start: 2018-02-09 | End: 2018-02-09 | Stop reason: HOSPADM

## 2018-02-09 RX ORDER — HYDROMORPHONE HCL 110MG/55ML
0.5 PATIENT CONTROLLED ANALGESIA SYRINGE INTRAVENOUS
Status: DISCONTINUED | OUTPATIENT
Start: 2018-02-09 | End: 2018-02-09 | Stop reason: HOSPADM

## 2018-02-09 RX ORDER — NALOXONE HCL 0.4 MG/ML
0.2 VIAL (ML) INJECTION AS NEEDED
Status: DISCONTINUED | OUTPATIENT
Start: 2018-02-09 | End: 2018-02-09 | Stop reason: HOSPADM

## 2018-02-09 RX ORDER — LEVOFLOXACIN 5 MG/ML
750 INJECTION, SOLUTION INTRAVENOUS EVERY 24 HOURS
Status: DISCONTINUED | OUTPATIENT
Start: 2018-02-10 | End: 2018-02-10

## 2018-02-09 RX ORDER — PROPOFOL 10 MG/ML
VIAL (ML) INTRAVENOUS AS NEEDED
Status: DISCONTINUED | OUTPATIENT
Start: 2018-02-09 | End: 2018-02-09 | Stop reason: SURG

## 2018-02-09 RX ORDER — HYDRALAZINE HYDROCHLORIDE 20 MG/ML
5 INJECTION INTRAMUSCULAR; INTRAVENOUS
Status: DISCONTINUED | OUTPATIENT
Start: 2018-02-09 | End: 2018-02-09 | Stop reason: HOSPADM

## 2018-02-09 RX ORDER — PROMETHAZINE HYDROCHLORIDE 25 MG/1
25 SUPPOSITORY RECTAL ONCE AS NEEDED
Status: DISCONTINUED | OUTPATIENT
Start: 2018-02-09 | End: 2018-02-09 | Stop reason: HOSPADM

## 2018-02-09 RX ORDER — FENTANYL CITRATE 50 UG/ML
INJECTION, SOLUTION INTRAMUSCULAR; INTRAVENOUS AS NEEDED
Status: DISCONTINUED | OUTPATIENT
Start: 2018-02-09 | End: 2018-02-09 | Stop reason: SURG

## 2018-02-09 RX ORDER — HYDROCODONE BITARTRATE AND ACETAMINOPHEN 10; 325 MG/1; MG/1
2 TABLET ORAL EVERY 4 HOURS PRN
Status: DISCONTINUED | OUTPATIENT
Start: 2018-02-19 | End: 2018-02-13 | Stop reason: HOSPADM

## 2018-02-09 RX ORDER — VANCOMYCIN HYDROCHLORIDE 1 G/200ML
1000 INJECTION, SOLUTION INTRAVENOUS ONCE
Status: COMPLETED | OUTPATIENT
Start: 2018-02-09 | End: 2018-02-10

## 2018-02-09 RX ORDER — SODIUM CHLORIDE 0.9 % (FLUSH) 0.9 %
1-10 SYRINGE (ML) INJECTION AS NEEDED
Status: DISCONTINUED | OUTPATIENT
Start: 2018-02-09 | End: 2018-02-09 | Stop reason: HOSPADM

## 2018-02-09 RX ORDER — PROMETHAZINE HYDROCHLORIDE 25 MG/1
12.5 TABLET ORAL ONCE AS NEEDED
Status: DISCONTINUED | OUTPATIENT
Start: 2018-02-09 | End: 2018-02-09 | Stop reason: HOSPADM

## 2018-02-09 RX ORDER — VANCOMYCIN HYDROCHLORIDE 1 G/200ML
1000 INJECTION, SOLUTION INTRAVENOUS EVERY 12 HOURS
Status: DISCONTINUED | OUTPATIENT
Start: 2018-02-10 | End: 2018-02-11

## 2018-02-09 RX ORDER — MIDAZOLAM HYDROCHLORIDE 1 MG/ML
1 INJECTION INTRAMUSCULAR; INTRAVENOUS
Status: DISCONTINUED | OUTPATIENT
Start: 2018-02-09 | End: 2018-02-09 | Stop reason: HOSPADM

## 2018-02-09 RX ORDER — MIDAZOLAM HYDROCHLORIDE 1 MG/ML
2 INJECTION INTRAMUSCULAR; INTRAVENOUS
Status: DISCONTINUED | OUTPATIENT
Start: 2018-02-09 | End: 2018-02-09 | Stop reason: HOSPADM

## 2018-02-09 RX ORDER — OXYCODONE AND ACETAMINOPHEN 7.5; 325 MG/1; MG/1
1 TABLET ORAL ONCE AS NEEDED
Status: DISCONTINUED | OUTPATIENT
Start: 2018-02-09 | End: 2018-02-09 | Stop reason: HOSPADM

## 2018-02-09 RX ORDER — PROMETHAZINE HYDROCHLORIDE 25 MG/1
25 TABLET ORAL ONCE AS NEEDED
Status: DISCONTINUED | OUTPATIENT
Start: 2018-02-09 | End: 2018-02-09 | Stop reason: HOSPADM

## 2018-02-09 RX ORDER — EPHEDRINE SULFATE 50 MG/ML
5 INJECTION, SOLUTION INTRAVENOUS ONCE AS NEEDED
Status: DISCONTINUED | OUTPATIENT
Start: 2018-02-09 | End: 2018-02-09 | Stop reason: HOSPADM

## 2018-02-09 RX ORDER — SODIUM CHLORIDE, SODIUM LACTATE, POTASSIUM CHLORIDE, CALCIUM CHLORIDE 600; 310; 30; 20 MG/100ML; MG/100ML; MG/100ML; MG/100ML
9 INJECTION, SOLUTION INTRAVENOUS CONTINUOUS
Status: DISCONTINUED | OUTPATIENT
Start: 2018-02-09 | End: 2018-02-13 | Stop reason: HOSPADM

## 2018-02-09 RX ORDER — LABETALOL HYDROCHLORIDE 5 MG/ML
5 INJECTION, SOLUTION INTRAVENOUS
Status: DISCONTINUED | OUTPATIENT
Start: 2018-02-09 | End: 2018-02-09 | Stop reason: HOSPADM

## 2018-02-09 RX ORDER — HYDROCODONE BITARTRATE AND ACETAMINOPHEN 7.5; 325 MG/1; MG/1
1 TABLET ORAL ONCE AS NEEDED
Status: DISCONTINUED | OUTPATIENT
Start: 2018-02-09 | End: 2018-02-09 | Stop reason: HOSPADM

## 2018-02-09 RX ORDER — LIDOCAINE HYDROCHLORIDE 10 MG/ML
0.5 INJECTION, SOLUTION EPIDURAL; INFILTRATION; INTRACAUDAL; PERINEURAL ONCE AS NEEDED
Status: DISCONTINUED | OUTPATIENT
Start: 2018-02-09 | End: 2018-02-09 | Stop reason: HOSPADM

## 2018-02-09 RX ORDER — HYDROMORPHONE HCL 110MG/55ML
PATIENT CONTROLLED ANALGESIA SYRINGE INTRAVENOUS
Status: COMPLETED
Start: 2018-02-09 | End: 2018-02-09

## 2018-02-09 RX ORDER — ONDANSETRON 2 MG/ML
INJECTION INTRAMUSCULAR; INTRAVENOUS AS NEEDED
Status: DISCONTINUED | OUTPATIENT
Start: 2018-02-09 | End: 2018-02-09 | Stop reason: SURG

## 2018-02-09 RX ORDER — FLUMAZENIL 0.1 MG/ML
0.2 INJECTION INTRAVENOUS AS NEEDED
Status: DISCONTINUED | OUTPATIENT
Start: 2018-02-09 | End: 2018-02-09 | Stop reason: HOSPADM

## 2018-02-09 RX ORDER — FAMOTIDINE 10 MG/ML
20 INJECTION, SOLUTION INTRAVENOUS ONCE
Status: COMPLETED | OUTPATIENT
Start: 2018-02-09 | End: 2018-02-09

## 2018-02-09 RX ORDER — DIPHENHYDRAMINE HYDROCHLORIDE 50 MG/ML
12.5 INJECTION INTRAMUSCULAR; INTRAVENOUS
Status: DISCONTINUED | OUTPATIENT
Start: 2018-02-09 | End: 2018-02-09 | Stop reason: HOSPADM

## 2018-02-09 RX ADMIN — VALSARTAN 160 MG: 160 TABLET ORAL at 21:30

## 2018-02-09 RX ADMIN — FENTANYL CITRATE 25 MCG: 50 INJECTION INTRAMUSCULAR; INTRAVENOUS at 16:10

## 2018-02-09 RX ADMIN — VANCOMYCIN HYDROCHLORIDE 1 G: 1 INJECTION, POWDER, LYOPHILIZED, FOR SOLUTION INTRAVENOUS at 16:27

## 2018-02-09 RX ADMIN — ZOLPIDEM TARTRATE 10 MG: 5 TABLET ORAL at 00:39

## 2018-02-09 RX ADMIN — HYDROMORPHONE HYDROCHLORIDE 0.5 MG: 2 INJECTION INTRAMUSCULAR; INTRAVENOUS; SUBCUTANEOUS at 17:16

## 2018-02-09 RX ADMIN — PROPOFOL 100 MG: 10 INJECTION, EMULSION INTRAVENOUS at 15:58

## 2018-02-09 RX ADMIN — DOCUSATE SODIUM -SENNOSIDES 1 TABLET: 50; 8.6 TABLET, COATED ORAL at 21:29

## 2018-02-09 RX ADMIN — VANCOMYCIN HYDROCHLORIDE 1000 MG: 1 INJECTION, SOLUTION INTRAVENOUS at 22:35

## 2018-02-09 RX ADMIN — PROPOFOL 100 MG: 10 INJECTION, EMULSION INTRAVENOUS at 16:00

## 2018-02-09 RX ADMIN — LEVOTHYROXINE SODIUM 88 MCG: 88 TABLET ORAL at 08:03

## 2018-02-09 RX ADMIN — MISOPROSTOL 200 MCG: 200 TABLET ORAL at 21:31

## 2018-02-09 RX ADMIN — HYDROMORPHONE HYDROCHLORIDE 0.5 MG: 2 INJECTION INTRAMUSCULAR; INTRAVENOUS; SUBCUTANEOUS at 17:33

## 2018-02-09 RX ADMIN — ONDANSETRON 4 MG: 2 INJECTION INTRAMUSCULAR; INTRAVENOUS at 16:40

## 2018-02-09 RX ADMIN — SERTRALINE 100 MG: 100 TABLET, FILM COATED ORAL at 21:32

## 2018-02-09 RX ADMIN — CARVEDILOL 6.25 MG: 6.25 TABLET, FILM COATED ORAL at 21:31

## 2018-02-09 RX ADMIN — FENTANYL CITRATE 50 MCG: 50 INJECTION INTRAMUSCULAR; INTRAVENOUS at 17:02

## 2018-02-09 RX ADMIN — SODIUM CHLORIDE, POTASSIUM CHLORIDE, SODIUM LACTATE AND CALCIUM CHLORIDE 9 ML/HR: 600; 310; 30; 20 INJECTION, SOLUTION INTRAVENOUS at 19:17

## 2018-02-09 RX ADMIN — HYDROCODONE BITARTRATE AND ACETAMINOPHEN 1 TABLET: 10; 325 TABLET ORAL at 21:31

## 2018-02-09 RX ADMIN — MIDAZOLAM 1 MG: 1 INJECTION INTRAMUSCULAR; INTRAVENOUS at 15:46

## 2018-02-09 RX ADMIN — FAMOTIDINE 20 MG: 10 INJECTION, SOLUTION INTRAVENOUS at 15:46

## 2018-02-09 RX ADMIN — FENTANYL CITRATE 50 MCG: 50 INJECTION INTRAMUSCULAR; INTRAVENOUS at 16:15

## 2018-02-09 RX ADMIN — SODIUM CHLORIDE, POTASSIUM CHLORIDE, SODIUM LACTATE AND CALCIUM CHLORIDE 9 ML/HR: 600; 310; 30; 20 INJECTION, SOLUTION INTRAVENOUS at 15:46

## 2018-02-09 RX ADMIN — HYDROCODONE BITARTRATE AND ACETAMINOPHEN 1 TABLET: 10; 325 TABLET ORAL at 00:39

## 2018-02-09 RX ADMIN — LIDOCAINE HYDROCHLORIDE 100 MG: 20 INJECTION, SOLUTION INFILTRATION; PERINEURAL at 15:58

## 2018-02-09 RX ADMIN — LEVOFLOXACIN 500 MG: 5 INJECTION, SOLUTION INTRAVENOUS at 16:29

## 2018-02-09 RX ADMIN — FENTANYL CITRATE 50 MCG: 50 INJECTION INTRAMUSCULAR; INTRAVENOUS at 16:52

## 2018-02-09 RX ADMIN — CARVEDILOL 6.25 MG: 6.25 TABLET, FILM COATED ORAL at 08:03

## 2018-02-09 NOTE — ANESTHESIA POSTPROCEDURE EVALUATION
"Patient: Renae CONNORS Schum    Procedure Summary     Date Anesthesia Start Anesthesia Stop Room / Location    02/09/18 9530 1702  [facility]       Procedure Diagnosis Surgeon Provider    RT ANKLE INCISION AND DRAINAGE  (Right ) No diagnosis on file. MD Kasia Rendon Jr., MD          Anesthesia Type: general  Last vitals  BP   179/82 (02/09/18 1815)   Temp   36.9 °C (98.5 °F) (02/09/18 1815)   Pulse   70 (02/09/18 1815)   Resp   16 (02/09/18 1815)     SpO2   97 % (02/09/18 1815)     Post Anesthesia Care and Evaluation    Patient location during evaluation: bedside  Patient participation: complete - patient participated  Level of consciousness: awake and alert  Pain management: adequate  Airway patency: patent  Anesthetic complications: No anesthetic complications    Cardiovascular status: acceptable  Respiratory status: acceptable  Hydration status: acceptable    Comments: /82 (BP Location: Left arm)  Pulse 70  Temp 36.9 °C (98.5 °F) (Oral)   Resp 16  Ht 167.6 cm (66\")  Wt 99.3 kg (219 lb)  SpO2 97%  BMI 35.35 kg/m2          "

## 2018-02-09 NOTE — PLAN OF CARE
Problem: Perioperative Period (Adult)  Goal: Signs and Symptoms of Listed Potential Problems Will be Absent or Manageable (Perioperative Period)  Outcome: Ongoing (interventions implemented as appropriate)   02/09/18 3926   Perioperative Period   Problems Assessed (Perioperative Period) pain;situational response   Problems Present (Perioperative Period) pain;situational response

## 2018-02-09 NOTE — PLAN OF CARE
Problem: Patient Care Overview (Adult)  Goal: Plan of Care Review  Outcome: Ongoing (interventions implemented as appropriate)   02/09/18 8512   Coping/Psychosocial Response Interventions   Plan Of Care Reviewed With patient   Patient Care Overview   Progress improving   Outcome Evaluation   Outcome Summary/Follow up Plan TO OR for I&D right ankle. awaiting report from PACU       Problem: Mobility, Physical Impaired (Adult)  Goal: Identify Related Risk Factors and Signs and Symptoms  Outcome: Outcome(s) achieved Date Met: 02/09/18    Goal: Enhanced Mobility Skills  Outcome: Ongoing (interventions implemented as appropriate)      Problem: Pain, Acute (Adult)  Goal: Identify Related Risk Factors and Signs and Symptoms  Outcome: Outcome(s) achieved Date Met: 02/09/18    Goal: Acceptable Pain Control/Comfort Level  Outcome: Ongoing (interventions implemented as appropriate)      Problem: Fall Risk (Adult)  Goal: Identify Related Risk Factors and Signs and Symptoms  Outcome: Outcome(s) achieved Date Met: 02/09/18    Goal: Absence of Falls  Outcome: Ongoing (interventions implemented as appropriate)

## 2018-02-09 NOTE — ANESTHESIA PROCEDURE NOTES
Airway  Urgency: elective    Airway not difficult    General Information and Staff    Patient location during procedure: OR  Anesthesiologist: ALYCIA MATOS    Indications and Patient Condition  Indications for airway management: airway protection    Preoxygenated: yes  Mask difficulty assessment: 0 - not attempted    Final Airway Details  Final airway type: supraglottic airway      Successful airway: classic  Size 4  Airway Seal Pressure (cm H2O): 20    Number of attempts at approach: 1    Additional Comments  Atraumatic

## 2018-02-09 NOTE — BRIEF OP NOTE
INCISION AND DRAINAGE LOWER EXTREMITY  Progress Note    Renae Tidwell  2/8/2018 - 2/9/2018    Pre-op Diagnosis:   1.  Postoperative abscess/infected hematoma, right ankle       Post-Op Diagnosis Codes:  Same    Procedure/CPT® Codes:      Procedure(s):  RT ANKLE INCISION AND DRAINAGE     Surgeon(s):  Richardson Summers Jr., MD    Anesthesia: Choice    Staff:   Circulator: Alva Shannon RN; Meka Morris RN  Scrub Person: Jacquie Ortiz RN; Jose A Howard    Estimated Blood Loss: 100 mL    Specimens:                  ID Type Source Tests Collected by Time Destination   1 : deep ankle fluid Wound Ankle, Right WOUND CULTURE Richardson Summers Jr., MD 2/9/2018 1612    2 : deep ankle tissue Tissue Ankle, Right TISSUE / BONE CULTURE Richardson Summers Jr., MD 2/9/2018 1613    3 : Talus Tissue Ankle, Right TISSUE / BONE CULTURE Richardson Summers Jr., MD 2/9/2018 1629          Drains:   Drain/Device Site 02/09/18 1635 Right other (see comments) collapsible closed device (Active)           Complications: None apparent      Richardson Summers Jr, MD     Date: 2/9/2018  Time: 4:56 PM

## 2018-02-09 NOTE — ANESTHESIA PREPROCEDURE EVALUATION
Anesthesia Evaluation     Patient summary reviewed and Nursing notes reviewed                Airway   Mallampati: III  no difficulty expected  Dental      Pulmonary - negative pulmonary ROS   Cardiovascular     ECG reviewed  Rhythm: regular  Rate: normal    (+) hypertension,       Neuro/Psych- negative ROS  GI/Hepatic/Renal/Endo    (+)  GERD, hypothyroidism,     Musculoskeletal (-) negative ROS    Abdominal    Substance History - negative use     OB/GYN negative ob/gyn ROS         Other                      Anesthesia Plan    ASA 3     general     intravenous induction   Anesthetic plan and risks discussed with patient.

## 2018-02-09 NOTE — ANESTHESIA PROCEDURE NOTES
Peripheral Block    Patient location during procedure: post-op  Start time: 2/9/2018 5:35 PM  Stop time: 2/9/2018 5:55 PM  Reason for block: at surgeon's request and post-op pain management  Performed by  Anesthesiologist: KATHY TEJEDA  Preanesthetic Checklist  Completed: patient identified, site marked, surgical consent, pre-op evaluation, timeout performed, IV checked, risks and benefits discussed and monitors and equipment checked  Prep:  Pt Position: supine  Sterile barriers:cap, gloves and mask  Prep: ChloraPrep  Patient monitoring: blood pressure monitoring, continuous pulse oximetry and EKG  Procedure  Sedation:yes  Performed under: local infiltration  Guidance:ultrasound guided  ULTRASOUND INTERPRETATION. Using ultrasound guidance a 22 G gauge needle was placed in close proximity to the nerve, at which point, under ultrasound guidance anesthetic was injected in the area of the nerve and spread of the anesthesia was seen on ultrasound in close proximity thereto.  There were no abnormalities seen on ultrasound; a digital image was taken; and the patient tolerated the procedure with no complications. Images:still images obtained    Laterality:right  Block Type:adductor canal block, femoral, popliteal and sciatic  Injection Technique:single-shot  Needle Type:echogenic  Needle Gauge:22 G  Resistance on Injection: none  Medications  Local Injected:ropivacaine 0.5% and lidocaine 2% with epinephrine Local Amount Injected:30mL  Post Assessment  Injection Assessment: negative aspiration for heme, no paresthesia on injection and incremental injection  Patient Tolerance:comfortable throughout block  Complications:no

## 2018-02-09 NOTE — H&P
Patient Care Team:  Martell Brooks MD as PCP - General (Internal Medicine)  Corey Pearson MD as Consulting Physician (Hematology and Oncology)  Nate Knapp MD as Referring Physician (Breast Surgery)        Subjective     History of Present Illness     The patient is a pleasant 82-year-old female approximately 3 weeks status post extensive right ankle and hindfoot fusion.  At her first postoperative visit she had significant drainage from her lateral incision concerning for an infected hematoma.  She was thus admitted for urgent irrigation and debridement.    She feels well, denies fevers or chills.    On admission she was found to have a normal white blood cell count.  ESR and CRP are elevated.  Vital signs have stayed stable.    Review of Systems   Review of Systems:  Constitutional: Negative  HENT: Negative  Eyes: Negative  Respiratory: Negative; no shortness of breath  Cardiovascular: Negative; no current chest pain  Gastrointestinal: Negative  : Negative  Skin: Negative except as listed in HPI  Neurological: Negative, no numbness or deficits  Hematological: Negative  Muscoloskeletal: Per HPI    Past Medical History:   Diagnosis Date   • Allergic rhinitis    • Anemia     Iron deficiency anemia   • Anesthesia complication     AFTER ONE SURGERY PT HAD SEVERE MUSCLE SPAMS   • Ankle fracture     RIGHT   • Arthritis     Osteoarthritis   • Basal cell carcinoma of nose 2010   • Cancer     Stage T1b/[id number], ER positive, 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   • Compression fracture 1970    At L4-L5   • DCIS (ductal carcinoma in situ) of breast     Of left breast post mastectomy   • Depression    • Esophageal ulceration    • GERD (gastroesophageal reflux disease)    • H/O seasonal allergies    • Hypertension    • Hypothyroidism    • MRSA (methicillin resistant Staphylococcus aureus) 05/2009    Of neck/TREATED AT [facility] BY PCP DR MARTELL BROOKS   • Osteoporosis    •  Thoracic outlet syndrome 1987   • Urinary incontinence     WEARS PAD     Past Surgical History:   Procedure Laterality Date   • BASAL CELL CARCINOMA EXCISION  2010    Nose   • BONE RESECTION  1987    Rib resections for thoracic outlet syndrome   • BREAST SURGERY      2012 right mastectomy and needle biopsy; 04/2013 left mastectomy for DCIS   • CHOLECYSTECTOMY  2003   • EYE SURGERY  2006    Bilateral cataract removal   • HYSTERECTOMY  1987    complete   • KNEE ARTHROSCOPY  2006    Right knee   • MASTECTOMY Right 2012    And needle biopsy   • NASAL SEPTUM SURGERY  1968   • OOPHORECTOMY  1987   • OTHER SURGICAL HISTORY  12/2008    Lumbar forminal and central stenosis requiring surgery   • PLANTAR'S WART EXCISION  2003   • ROTATOR CUFF REPAIR  2007    Right side   • TENDON REPAIR  05/2011    Of right foot   • TONSILLECTOMY  1952   • TOTAL KNEE ARTHROPLASTY Left 2004   • TOTAL SHOULDER ARTHROPLASTY W/ DISTAL CLAVICLE EXCISION Right 6/22/2017    Procedure: RIGHT TOTAL SHOULDER REVERSE ARTHROPLASTY W/ AUGMENT ;  Surgeon: Kofi Rivas MD;  Location: [facility] OR [facility];  Service:    • WRIST SURGERY  1970's    Four surgeries for ganglion cyst     Family History   Problem Relation Age of Onset   • No Known Problems Mother    • Lung cancer Father 62   • Lung cancer Sister    • Melanoma Brother    • Cancer Sister      Unknown cancer   • Malig Hyperthermia Neg Hx      Social History   Substance Use Topics   • Smoking status: Never Smoker   • Smokeless tobacco: Never Used   • Alcohol use Yes      Comment: Rare     Prescriptions Prior to Admission   Medication Sig Dispense Refill Last Dose   • albuterol (PROVENTIL HFA;VENTOLIN HFA) 108 (90 BASE) MCG/ACT inhaler Inhale 2 puffs Every 4 (Four) Hours As Needed.   1/18/2018 at 0900   • aspirin  MG EC tablet Take 1 tablet by mouth Daily. 30 tablet 0    • carvedilol (COREG) 6.25 MG tablet Take 6.25 mg by mouth 2 (two) times a day with meals.   1/19/2018 at 0800   • Cholecalciferol  "(VITAMIN D-3 PO) Take 1 tablet by mouth Daily. HOLD PRIOR TO SURG   1/18/2018 at 0900   • dexlansoprazole (DEXILANT) 60 MG capsule Take 60 mg by mouth Every Evening.   1/18/2018 at 2100   • furosemide (LASIX) 20 MG tablet Take 20 mg by mouth 1 (One) Time Per Week. ON SUNDAYS   1/14/2018   • HYDROcodone-acetaminophen (NORCO)  MG per tablet Take 1 tablet by mouth Every 4 (Four) Hours As Needed for Moderate Pain  or Severe Pain  (1 tablet pain 3-6/10, 2 tablet pain 7-10/10). 90 tablet 0    • levothyroxine (SYNTHROID, LEVOTHROID) 88 MCG tablet Take 88 mcg by mouth Daily. DOES NOT TAKE ON Sunday\"S   1/18/2018 at 0900   • misoprostol (CYTOTEC) 200 MCG tablet Take 200 mcg by mouth 2 (two) times a day.   1/5/2018   • mometasone (NASONEX) 50 MCG/ACT nasal spray 2 sprays into each nostril as needed.   1/16/2018   • Multiple Vitamins-Minerals (OCUVITE PO) Take 1 tablet by mouth Daily. HOLD PRIOR TO SURG   1/18/2018 at 0900   • potassium chloride (K-DUR) 10 MEQ CR tablet Take 10 mEq by mouth 1 (One) Time Per Week.   1/14/2018   • raNITIdine (ZANTAC) 300 MG tablet Take 300 mg by mouth Every Night.   1/18/2018 at 2100   • sertraline (ZOLOFT) 100 MG tablet Take 100 mg by mouth every evening.   1/18/2018 at 2100   • tolterodine LA (DETROL LA) 4 MG 24 hr capsule Take 4 mg by mouth Daily As Needed.   1/17/2018   • valsartan (DIOVAN) 160 MG tablet Take 160 mg by mouth Every Evening.   1/18/2018 at 2100   • zolpidem (AMBIEN) 10 MG tablet Take 10 mg by mouth at night as needed for sleep.   1/18/2018 at 2100     Allergies:  Aspirin; Grass; Morphine and related; Other; and Adhesive tape    Objective      Vital Signs  Temp:  [97.1 °F (36.2 °C)-98.7 °F (37.1 °C)] 98.1 °F (36.7 °C)  Heart Rate:  [72-83] 83  Resp:  [16] 16  BP: (124-184)/(66-90) 168/90    Physical Exam    Physical Exam:  Vital signs reviewed.  Constitutional:  Appears well-developed, well nourished.  HEENT:  Head: normocephalic, atraumatic  Eyes: EOMI, grossly normal.  " No scleral icterus.  Neck: Normal range of motion.  Supple, no tracheal deviation.  Cardiovascular: Regular rate.  Pulmonary: Effort normal, symmetric chest expansion, no labored breathing.  Abdominal: Soft, non distended  Neurological: No gross deficits, oriented to person, place, and time.  Skin: Warm and dry  Psychiatric: Normal mood and affect  Musculoskeletal:  Moderate persistent brown drainage from the lateral incision.  No surrounding erythema or sign of obvious cellulitis.    The other incisions remain healed.  There is evidence of some skin breakdown over the posterior heel incision.    No clinical dorsiflexion or plantarflexion consistent with arthrodesis.  Sensation intact to light touch throughout.  Toes are warm and well perfused.        Assessment/Plan     Active Problems:    Postoperative infection      Assessment & Plan      Please refer to clinic note dated 2/8/18 for full evaluation.        This is an 82-year-old female presenting with a postoperative hematoma/suspected infection 3 weeks status post extensive ankle and hindfoot arthrodesis.    We will proceed with irrigation and debridement with deep cultures.    We'll initiate antibiotics after cultures are obtained and request the assistance of our infectious disease colleagues.  She understands she'll likely need prolonged IV antibiotics, as the hardware cannot be removed at this point.  She also understands there is a very real risk of limb loss or amputation if she has a deep infection surrounding bone/hardware.    The risks/benefits of surgery, including pain, infection, wound healing problems, need for future procedures, mal/nonunion, DVT/PE, cardiac event, and/or death were discussed, and the patient elected to proceed with surgery.    - Continue nothing by mouth for I&D  - Hold abx given lack of septicemia  - DVT: PRINCESS/SCDs, hold formal chemoprophylaxis  - Pain control  - Dispo: pending      Richardson Summers Jr, MD  02/09/18  3:39 PM

## 2018-02-09 NOTE — PLAN OF CARE
Problem: Patient Care Overview (Adult)  Goal: Plan of Care Review  Outcome: Ongoing (interventions implemented as appropriate)   02/09/18 0748   Coping/Psychosocial Response Interventions   Plan Of Care Reviewed With patient   Patient Care Overview   Progress improving   Outcome Evaluation   Outcome Summary/Follow up Plan having some back spasms. takes bacolfen at home for it. NPO for surgery today at 1500. needs consents for proceedure.educated on importance of monitoring blood pressure at home     Goal: Adult Individualization and Mutuality  Outcome: Ongoing (interventions implemented as appropriate)    Goal: Discharge Needs Assessment  Outcome: Ongoing (interventions implemented as appropriate)      Problem: Pain, Acute (Adult)  Goal: Identify Related Risk Factors and Signs and Symptoms  Outcome: Ongoing (interventions implemented as appropriate)    Goal: Acceptable Pain Control/Comfort Level  Outcome: Ongoing (interventions implemented as appropriate)      Problem: Fall Risk (Adult)  Goal: Identify Related Risk Factors and Signs and Symptoms  Outcome: Ongoing (interventions implemented as appropriate)    Goal: Absence of Falls  Outcome: Ongoing (interventions implemented as appropriate)

## 2018-02-09 NOTE — PLAN OF CARE
Problem: Patient Care Overview (Adult)  Goal: Plan of Care Review  Outcome: Ongoing (interventions implemented as appropriate)  Pt direct admit from dr randle' office. No c/o pain. VSS. NVI. Pt admitted for possible infection to ankle. Pain is controlled no PO pain medications given. A/o x4. Possible OR in am. Discussed importance of blood pressure and stress reduction with pt h/o GERD, depression, and ulcers. Will monitor.    02/08/18 2009   Coping/Psychosocial Response Interventions   Plan Of Care Reviewed With patient   Patient Care Overview   Progress no change     Goal: Adult Individualization and Mutuality  Outcome: Ongoing (interventions implemented as appropriate)    Goal: Discharge Needs Assessment  Outcome: Ongoing (interventions implemented as appropriate)      Problem: Mobility, Physical Impaired (Adult)  Goal: Identify Related Risk Factors and Signs and Symptoms  Outcome: Ongoing (interventions implemented as appropriate)    Goal: Enhanced Mobility Skills  Outcome: Ongoing (interventions implemented as appropriate)    Goal: Enhanced Functionality Ability  Outcome: Ongoing (interventions implemented as appropriate)      Problem: Pain, Acute (Adult)  Goal: Identify Related Risk Factors and Signs and Symptoms  Outcome: Ongoing (interventions implemented as appropriate)    Goal: Acceptable Pain Control/Comfort Level  Outcome: Ongoing (interventions implemented as appropriate)      Problem: Fall Risk (Adult)  Goal: Identify Related Risk Factors and Signs and Symptoms  Outcome: Ongoing (interventions implemented as appropriate)    Goal: Absence of Falls  Outcome: Ongoing (interventions implemented as appropriate)

## 2018-02-10 LAB
ANION GAP SERPL CALCULATED.3IONS-SCNC: 9.6 MMOL/L
BUN BLD-MCNC: 12 MG/DL (ref 8–23)
BUN/CREAT SERPL: 19.4 (ref 7–25)
CALCIUM SPEC-SCNC: 8.6 MG/DL (ref 8.6–10.5)
CHLORIDE SERPL-SCNC: 97 MMOL/L (ref 98–107)
CO2 SERPL-SCNC: 28.4 MMOL/L (ref 22–29)
CREAT BLD-MCNC: 0.62 MG/DL (ref 0.57–1)
GFR SERPL CREATININE-BSD FRML MDRD: 92 ML/MIN/1.73
GLUCOSE BLD-MCNC: 105 MG/DL (ref 65–99)
POTASSIUM BLD-SCNC: 4.2 MMOL/L (ref 3.5–5.2)
SODIUM BLD-SCNC: 135 MMOL/L (ref 136–145)

## 2018-02-10 PROCEDURE — A9270 NON-COVERED ITEM OR SERVICE: HCPCS | Performed by: ORTHOPAEDIC SURGERY

## 2018-02-10 PROCEDURE — G8979 MOBILITY GOAL STATUS: HCPCS | Performed by: PHYSICAL THERAPIST

## 2018-02-10 PROCEDURE — 63710000001 ASPIRIN 325 MG TABLET: Performed by: ORTHOPAEDIC SURGERY

## 2018-02-10 PROCEDURE — 63710000001 PANTOPRAZOLE 40 MG TABLET DELAYED-RELEASE: Performed by: ORTHOPAEDIC SURGERY

## 2018-02-10 PROCEDURE — G8978 MOBILITY CURRENT STATUS: HCPCS | Performed by: PHYSICAL THERAPIST

## 2018-02-10 PROCEDURE — 63710000001 FLUTICASONE 50 MCG/ACT SUSPENSION 16 G BOTTLE: Performed by: ORTHOPAEDIC SURGERY

## 2018-02-10 PROCEDURE — 63710000001 MISOPROSTOL 200 MCG TABLET: Performed by: ORTHOPAEDIC SURGERY

## 2018-02-10 PROCEDURE — 97110 THERAPEUTIC EXERCISES: CPT | Performed by: PHYSICAL THERAPIST

## 2018-02-10 PROCEDURE — 80048 BASIC METABOLIC PNL TOTAL CA: CPT | Performed by: ORTHOPAEDIC SURGERY

## 2018-02-10 PROCEDURE — 25010000003 CEFTRIAXONE PER 250 MG: Performed by: INTERNAL MEDICINE

## 2018-02-10 PROCEDURE — 99205 OFFICE O/P NEW HI 60 MIN: CPT | Performed by: INTERNAL MEDICINE

## 2018-02-10 PROCEDURE — 97162 PT EVAL MOD COMPLEX 30 MIN: CPT | Performed by: PHYSICAL THERAPIST

## 2018-02-10 PROCEDURE — 63710000001 HYDROCODONE-ACETAMINOPHEN 10-325 MG TABLET: Performed by: ORTHOPAEDIC SURGERY

## 2018-02-10 PROCEDURE — 63710000001 CARVEDILOL 6.25 MG TABLET: Performed by: ORTHOPAEDIC SURGERY

## 2018-02-10 PROCEDURE — 63710000001 OXYBUTYNIN XL 5 MG TABLET SUSTAINED-RELEASE 24 HOUR: Performed by: ORTHOPAEDIC SURGERY

## 2018-02-10 PROCEDURE — 63710000001 LEVOTHYROXINE 88 MCG TABLET: Performed by: ORTHOPAEDIC SURGERY

## 2018-02-10 PROCEDURE — 63710000001 POTASSIUM CHLORIDE 10 MEQ CAPSULE CONTROLLED-RELEASE: Performed by: ORTHOPAEDIC SURGERY

## 2018-02-10 PROCEDURE — 63710000001 FAMOTIDINE 20 MG TABLET: Performed by: ORTHOPAEDIC SURGERY

## 2018-02-10 PROCEDURE — 25010000002 VANCOMYCIN PER 500 MG: Performed by: ORTHOPAEDIC SURGERY

## 2018-02-10 RX ORDER — CEFTRIAXONE SODIUM 2 G/50ML
2 INJECTION, SOLUTION INTRAVENOUS EVERY 24 HOURS
Status: DISCONTINUED | OUTPATIENT
Start: 2018-02-10 | End: 2018-02-13 | Stop reason: HOSPADM

## 2018-02-10 RX ORDER — BACLOFEN 10 MG/1
10 TABLET ORAL EVERY 6 HOURS PRN
Status: DISCONTINUED | OUTPATIENT
Start: 2018-02-10 | End: 2018-02-13 | Stop reason: HOSPADM

## 2018-02-10 RX ORDER — HYDROCODONE BITARTRATE AND ACETAMINOPHEN 10; 325 MG/1; MG/1
1 TABLET ORAL EVERY 4 HOURS PRN
Qty: 90 TABLET | Refills: 0 | Status: ON HOLD | OUTPATIENT
Start: 2018-02-10 | End: 2018-02-23

## 2018-02-10 RX ORDER — ASPIRIN 325 MG
325 TABLET ORAL DAILY
Status: DISCONTINUED | OUTPATIENT
Start: 2018-02-10 | End: 2018-02-13 | Stop reason: HOSPADM

## 2018-02-10 RX ADMIN — OXYBUTYNIN CHLORIDE 5 MG: 5 TABLET, EXTENDED RELEASE ORAL at 08:34

## 2018-02-10 RX ADMIN — POTASSIUM CHLORIDE 10 MEQ: 750 CAPSULE, EXTENDED RELEASE ORAL at 08:34

## 2018-02-10 RX ADMIN — DOCUSATE SODIUM -SENNOSIDES 2 TABLET: 50; 8.6 TABLET, COATED ORAL at 22:20

## 2018-02-10 RX ADMIN — HYDROCODONE BITARTRATE AND ACETAMINOPHEN 1 TABLET: 10; 325 TABLET ORAL at 06:19

## 2018-02-10 RX ADMIN — PANTOPRAZOLE SODIUM 40 MG: 40 TABLET, DELAYED RELEASE ORAL at 06:19

## 2018-02-10 RX ADMIN — HYDROCODONE BITARTRATE AND ACETAMINOPHEN 1 TABLET: 10; 325 TABLET ORAL at 15:42

## 2018-02-10 RX ADMIN — BACLOFEN 10 MG: 10 TABLET ORAL at 15:42

## 2018-02-10 RX ADMIN — ASPIRIN 325 MG: 325 TABLET ORAL at 08:37

## 2018-02-10 RX ADMIN — MISOPROSTOL 200 MCG: 200 TABLET ORAL at 08:34

## 2018-02-10 RX ADMIN — HYDROCODONE BITARTRATE AND ACETAMINOPHEN 1 TABLET: 10; 325 TABLET ORAL at 01:48

## 2018-02-10 RX ADMIN — CEFTRIAXONE SODIUM 2 G: 2 INJECTION, SOLUTION INTRAVENOUS at 13:14

## 2018-02-10 RX ADMIN — HYDROCODONE BITARTRATE AND ACETAMINOPHEN 1 TABLET: 10; 325 TABLET ORAL at 11:34

## 2018-02-10 RX ADMIN — VANCOMYCIN HYDROCHLORIDE 1000 MG: 1 INJECTION, SOLUTION INTRAVENOUS at 08:34

## 2018-02-10 RX ADMIN — CARVEDILOL 6.25 MG: 6.25 TABLET, FILM COATED ORAL at 08:34

## 2018-02-10 RX ADMIN — VANCOMYCIN HYDROCHLORIDE 1000 MG: 1 INJECTION, SOLUTION INTRAVENOUS at 22:20

## 2018-02-10 RX ADMIN — MISOPROSTOL 200 MCG: 200 TABLET ORAL at 22:20

## 2018-02-10 RX ADMIN — FLUTICASONE PROPIONATE 2 SPRAY: 50 SPRAY, METERED NASAL at 08:34

## 2018-02-10 RX ADMIN — VALSARTAN 160 MG: 160 TABLET ORAL at 17:22

## 2018-02-10 RX ADMIN — LEVOTHYROXINE SODIUM 88 MCG: 88 TABLET ORAL at 08:34

## 2018-02-10 RX ADMIN — SERTRALINE 100 MG: 100 TABLET, FILM COATED ORAL at 17:22

## 2018-02-10 RX ADMIN — FAMOTIDINE 20 MG: 20 TABLET, FILM COATED ORAL at 08:34

## 2018-02-10 RX ADMIN — HYDROCODONE BITARTRATE AND ACETAMINOPHEN 1 TABLET: 10; 325 TABLET ORAL at 19:39

## 2018-02-10 RX ADMIN — CARVEDILOL 6.25 MG: 6.25 TABLET, FILM COATED ORAL at 17:22

## 2018-02-10 NOTE — PROGRESS NOTES
"Orthopaedic Foot and Ankle Surgery  Daily Progress Note    /72 (BP Location: Left arm, Patient Position: Lying)  Pulse 70  Temp 98.7 °F (37.1 °C) (Oral)   Resp 16  Ht 167.6 cm (66\")  Wt 99.3 kg (219 lb)  SpO2 96%  BMI 35.35 kg/m2    Lab Results (last 24 hours)     Procedure Component Value Units Date/Time    Basic Metabolic Panel [[id number]]  (Abnormal) Collected:  02/10/18 0312    Specimen:  Blood Updated:  02/10/18 0443     Glucose 105 (H) mg/dL      BUN 12 mg/dL      Creatinine 0.62 mg/dL      Sodium 135 (L) mmol/L      Potassium 4.2 mmol/L      Chloride 97 (L) mmol/L      CO2 28.4 mmol/L      Calcium 8.6 mg/dL      eGFR Non African Amer 92 mL/min/1.73      BUN/Creatinine Ratio 19.4     Anion Gap 9.6 mmol/L     Narrative:       The MDRD GFR formula is only valid for adults with stable renal function between ages 18 and 70.    Tissue / Bone Culture - Tissue, Ankle, Right [[id number]]  (Normal) Collected:  02/09/18 1613    Specimen:  Tissue from Ankle, Right Updated:  02/10/18 0743     Tissue Culture No growth     Gram Stain Result Many (4+) Red blood cells      No organisms seen    Tissue / Bone Culture - Tissue, Ankle, Right [[id number]]  (Normal) Collected:  02/09/18 1629    Specimen:  Tissue from Ankle, Right Updated:  02/10/18 0743     Tissue Culture No growth     Gram Stain Result Many (4+) Red blood cells      No organisms seen    Wound Culture - Wound, Ankle, Right [[id number]]  (Normal) Collected:  02/09/18 1612    Specimen:  Wound from Ankle, Right Updated:  02/10/18 0745     Wound Culture No growth     Gram Stain Result Many (4+) Red blood cells      No organisms seen          Imaging Results (last 24 hours)     ** No results found for the last 24 hours. **          Patient Care Team:  Anne Brooks MD as PCP - General (Internal Medicine)  Corey Pearson MD as Consulting Physician (Hematology and Oncology)  Nate Knapp MD as Referring Physician (Breast " Surgery)    SUBJECTIVE  Pain controlled    PHYSICAL EXAM  Resting in NAD  Dressing clean, dry, intact  Toes warm, perfused, brisk capillary refill  Flexes/extends toes  SILT over toes  No pain with passive stretch     Active Problems:    Postoperative infection      PLAN / DISPOSITION:  POD 1 s/p I&D of right ankle    OR cultures NGTD    Greatly appreciate Dr. Portillo's ID assistance, continue broad spectrum abx for now pending culture data    Will leave drain 48hrs    1. Pain control: Orals  2.  Antibiotics: vanc/ceftriaxone pending culture data  3.  PT: Strict NWB RLE  4. DVT: ASA 325mg plus mobilization; will refrain from Lovenox given postop hematoma  5. Dispo: pending final antibiotic/ID plan, likely Monday, follow up 7 -10 days with me at [facility] ([phone] to make appointment)    Richardson Summers Jr, MD  02/10/18  8:22 AM

## 2018-02-10 NOTE — PLAN OF CARE
Problem: Patient Care Overview (Adult)  Goal: Plan of Care Review  Outcome: Ongoing (interventions implemented as appropriate)   02/10/18 0908   Coping/Psychosocial Response Interventions   Plan Of Care Reviewed With patient;family   Outcome Evaluation   Outcome Summary/Follow up Plan 83 y/o F evaluated by physical therapy s/p I & D of R ankle secondary to post-op hematoma/infection following arthrodesis. Patient now NWB RLE in ace wrap with drain and c/o L heel pain with preventative waffle boot in place. She requires mod to maxA for supine<>sit transfers and c/o LBP throughout and quickly fatigues after sitting x 4 minutes. Patient will likely require return to subacute rehab for additional therapy prior to discharge home.

## 2018-02-10 NOTE — PROGRESS NOTES
"Pharmacokinetic Consult - Vancomycin Dosing (Initial Note)    Renae CONNORS Steffnena has been consulted for pharmacy to dose vancomycin for postoperative abscess/infected hematoma of the right ankle per Dr. Summers' request. Goal trough: 15-20 mcg/mL.    Duration of Therapy: 3 days    Other antimicrobials: Levofloxacin 750 mg IV q24h     Relevant clinical data and objective history reviewed:  82 y.o. female 167.6 cm (66\") 99.3 kg (219 lb)  Patient is approximately 3 weeks status post extensive right ankle and hindfoot fusion. She has been having significant drainage from her incision concerning for infection. Patient is now s/p I&D of right ankle today-formal ID consult ordered.    Creatinine   Date Value Ref Range Status   02/08/2018 0.76 0.57 - 1.00 mg/dL Final     BUN   Date Value Ref Range Status   02/08/2018 18 8 - 23 mg/dL Final     Estimated Creatinine Clearance: 64.4 mL/min (by C-G formula based on Cr of 0.76).    Lab Results   Component Value Date    WBC 8.67 02/08/2018     Temp Readings from Last 3 Encounters:   02/09/18 98.2 °F (36.8 °C) (Oral)      Baseline culture/source/susceptibility:   2/9: Wound culture-in process  2/9: Tissue/Bone cultures x 2-in process    Assessment/Plan    1. Will give a vancomycin loading dose of 2000 mg (20 mg/kg) IV once now, then start a maintenance dose of 1000 mg (~10 mg/kg) IV q12h tomorrow morning based on patient parameters.    2. Will schedule a vancomycin trough for Sunday morning 2/11 before the 0900 dose (before 4th total dose).    3. Will monitor serum creatinine every 24 hours for the first 3 days then at least every 48 hours per dosing recommendations. SCr was 0.76 on 2/8 before antibiotics were started. Will make sure SCr is ordered for tomorrow.     4. Pharmacy will continue to follow daily while on vancomycin and adjust as needed.     Thank you for allowing me to participate in your patient's care,  Lacy Alves, Pharm.D., BCPS  "

## 2018-02-10 NOTE — OP NOTE
Procedure Note    Renae Tidwell  2/8/2018 - 2/9/2018    Pre-op Diagnosis:   1.  Postoperative infection/abscess, right ankle       Post-Op Diagnosis Codes:  Same    Procedure:  1.  Right ankle/hindfoot arthrotomy with irrigation, debridement    Surgeon(s):  Richardson Summers Jr., MD    Anesthesia: LMA    Estimated Blood Loss: 100 mL    Specimens:                  ID Type Source Tests Collected by Time Destination   1 : deep ankle fluid Wound Ankle, Right WOUND CULTURE Richardson Summers Jr., MD 2/9/2018 1612    2 : deep ankle tissue Tissue Ankle, Right TISSUE / BONE CULTURE Richardson Summers Jr., MD 2/9/2018 1613    3 : Talus Tissue Ankle, Right TISSUE / BONE CULTURE Richardson Summers Jr., MD 2/9/2018 1629          Drains: Medium HV x 1    Complications: None apparent    Disposition: Stable to PACU for recovery    Indications for procedure:  The patient is a pleasant 82-year-old female approximately 3 weeks status post extensive right ankle and hindfoot fusion.  At her first postoperative visit she had significant drainage from her lateral incision concerning for an infected hematoma/abscess.  Exploration, irrigation, and debridement was recommended.  The risks/benefits of surgery, including pain, infection, wound healing problems, need for future procedures, mal/nonunion, DVT/PE, cardiac event, and/or death were discussed, and the patient elected to proceed with surgery.      Procedure in detail:  The correct patient was identified in preoperative holding.  All risks and benefits of surgery were again discussed in detail, and the patient agreed to proceed with surgery.  The operative extremity was confirmed and marked by myself.  Operative consent reviewed and confirmed to be signed by the patient and myself.    At this time, the patient was wheeled to the operative theatre and placed supine on the OR table.  PRINCESS/SCD placed on nonoperative leg. Anesthesia was induced smoothly by our anesthesia colleagues.   Well padded nonsterile  tourniquet placed on the upper thigh. The right lower extremity was prepped and draped in standard sterile fashion.  Appropriate presurgical timeout was performed, confirming correct patient, correct extremity, correct procedure, availability of sterile instruments; antibiotics were held until cultures could be obtained.    Her lateral ankle/hindfoot incision was reopened.  There was a copious amount of brownish fluid that had the appearance of an infected hematoma.  A sample was sent for cultures.  The fluid was irrigated away.  Dissection was carried down deeper to the ankle and subtalar joints/arthrodesis sites.  A curette was used to debride the bony surfaces of the distal fibula, tibia, talus, calcaneus, and arthrodesis sites.  Two deep cultures were sent.  Antibiotics were then administered IV in the form of vancomycin and levofloxacin.      At this time, a rongeur and curette were used to debride any unhealthy appearing tissue.  It should be noted the lateral to medial calcaneal screwhead was exposed.  The skin edges were debrided back to healthy margins with a fresh knife blade.    Once all infected/unhealthy tissue had been removed, the wound was copiously irrigated with 3L saline with bacitracin via cystocopy tubing.  Dried vancomycin powder was placed directly in the wound bed.  Deep Hemovac dressing placed.    The wound was then closed with 00 PDS in the deeper subcutaneous layers and 00 nylon on the skin.  Xeroform, sterile gauze, and a well padded bulky dressing were applied.  The drapes were taken down.  Patient was woken from anesthesia without apparent complication and taken to PACU for recovery.            Richardson Summers Jr, MD     Date: 2/9/2018  Time: 9:46 PM

## 2018-02-10 NOTE — PROGRESS NOTES
Changed load from 2gm to 1gm based on 1gm given in OR at 1633 2/9. 1gm in OR plus 1gm given at 2100 = 2gm load. JENNIFER

## 2018-02-10 NOTE — CONSULTS
Referring Provider: Richardson Summers Jr., MD  [address] [facility] [address]  [place] [address]    Reason for Consultation: post-op infection around ankle hardware    History of present illness:  Mrs Tidwell is a 81 YO who I am asked to evaluate and give opinion for post-op infection around R ankle hardware. History is obtained from the patient, family, Dr Summers, and review of the old medical records which I summarize/synthesize as follows: On 1/19/18 she underwent right ankle fusion, subtalar fusion, talonavicular fusion, right talar head osteotomy, and Achilles tenotomy. When she presented for her first post-op visit she was noted to have brownish drainage from the incision site. She denies associated pain or drainage. There were no exacerbating factors. There was concern for infected hematoma. Therefore on 2/9/18 she went to OR for debridement. Further brown fluid was encountered and cultured. It was too early for hardware to be removed. She was started on empiric vancomycin and levofloxacin which she is tolerating well.     Of note, she has a distant history of MRSA infection of the skin of the neck.    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   • Compression fracture 1970    At L4-L5   • DCIS (ductal carcinoma in situ) of breast     Of left breast post mastectomy   • Depression    • Esophageal ulceration    • GERD (gastroesophageal reflux disease)    • H/O seasonal allergies    • Hypertension    • Hypothyroidism    • MRSA (methicillin resistant Staphylococcus aureus) 05/2009    Of neck/TREATED AT [facility] BY PCP DR MARTELL LE   • Osteoporosis    • Thoracic outlet syndrome  1987   • Urinary incontinence     WEARS PAD       Past Surgical History:   Procedure Laterality Date   • BASAL CELL CARCINOMA EXCISION  2010    Nose   • BONE RESECTION  1987    Rib resections for thoracic outlet syndrome   • BREAST SURGERY      2012 right mastectomy and needle biopsy; 04/2013 left mastectomy for DCIS   • CHOLECYSTECTOMY  2003   • EYE SURGERY  2006    Bilateral cataract removal   • HYSTERECTOMY  1987    complete   • KNEE ARTHROSCOPY  2006    Right knee   • MASTECTOMY Right 2012    And needle biopsy   • NASAL SEPTUM SURGERY  1968   • OOPHORECTOMY  1987   • OTHER SURGICAL HISTORY  12/2008    Lumbar forminal and central stenosis requiring surgery   • PLANTAR'S WART EXCISION  2003   • ROTATOR CUFF REPAIR  2007    Right side   • TENDON REPAIR  05/2011    Of right foot   • TONSILLECTOMY  1952   • TOTAL KNEE ARTHROPLASTY Left 2004   • TOTAL SHOULDER ARTHROPLASTY W/ DISTAL CLAVICLE EXCISION Right 6/22/2017    Procedure: RIGHT TOTAL SHOULDER REVERSE ARTHROPLASTY W/ AUGMENT ;  Surgeon: Kofi Rivas MD;  Location[place] [facility] [place] [facility];  Service:    • WRIST SURGERY  1970's    Four surgeries for ganglion cyst       Social History:  Lives in rehab facility  Retired educator    Family History:  Multiple 1st degree relatives w/ cancer    Allergies:  No Antibiotic Allergies    Medications:    Current Facility-Administered Medications:   •  albuterol (PROVENTIL) nebulizer solution 0.083% 2.5 mg/3mL, 2.5 mg, Nebulization, Q6H PRN, Richardson Summers Jr., MD  •  carvedilol (COREG) tablet 6.25 mg, 6.25 mg, Oral, BID With Meals, Richardson Summers Jr., MD, 6.25 mg at 02/09/18 2131  •  famotidine (PEPCID) tablet 20 mg, 20 mg, Oral, Daily, Richardson Summers Jr., MD, 20 mg at 02/08/18 2018  •  fluticasone (FLONASE) 50 MCG/ACT nasal spray 2 spray, 2 spray, Each Nare, Daily, Richardson Summers Jr., MD  •  [START ON 2/11/2018] furosemide (LASIX) tablet 20 mg, 20 mg, Oral, Weekly, Richardson Summers Jr., MD  •  HYDROcodone-acetaminophen (NORCO)  MG per  tablet 1 tablet, 1 tablet, Oral, Q4H PRN, 1 tablet at 02/10/18 0619 **FOLLOWED BY** [START ON 2/19/2018] HYDROcodone-acetaminophen (NORCO)  MG per tablet 2 tablet, 2 tablet, Oral, Q4H PRN, Richardson Summers Jr., MD  •  lactated ringers infusion, 9 mL/hr, Intravenous, Continuous, Antione Wilson MD, Last Rate: 9 mL/hr at 02/09/18 1917, 9 mL/hr at 02/09/18 1917  •  levoFLOXacin (LEVAQUIN) 750 mg/150 mL D5W (premix) (LEVAQUIN) 750 mg, 750 mg, Intravenous, Q24H, Richardson Summers Jr., MD  •  levothyroxine (SYNTHROID, LEVOTHROID) tablet 88 mcg, 88 mcg, Oral, Daily, Richardson Summers Jr., MD, 88 mcg at 02/09/18 0803  •  misoprostol (CYTOTEC) tablet 200 mcg, 200 mcg, Oral, BID, Richardson Summers Jr., MD, 200 mcg at 02/09/18 2131  •  morphine injection 1 mg, 1 mg, Intravenous, Q4H PRN **AND** naloxone (NARCAN) injection 0.4 mg, 0.4 mg, Intravenous, Q5 Min PRN, Richardson Summers Jr., MD  •  ondansetron (ZOFRAN) injection 4 mg, 4 mg, Intravenous, Q6H PRN, Richardson Summers Jr., MD  •  oxybutynin XL (DITROPAN-XL) 24 hr tablet 5 mg, 5 mg, Oral, Daily, Richardson Summers Jr., MD  •  pantoprazole (PROTONIX) EC tablet 40 mg, 40 mg, Oral, QAM, Richardson Summers Jr., MD, 40 mg at 02/10/18 0619  •  Pharmacy to dose vancomycin, , Does not apply, Continuous PRN, Richardson Summers Jr., MD  •  potassium chloride (MICRO-K) CR capsule 10 mEq, 10 mEq, Oral, Daily, Richardson Summers Jr., MD  •  sennosides-docusate sodium (SENOKOT-S) 8.6-50 MG tablet 2 tablet, 2 tablet, Oral, Nightly, Richardson Summers Jr., MD, 1 tablet at 02/09/18 2129  •  sertraline (ZOLOFT) tablet 100 mg, 100 mg, Oral, Q PM, Richardson Summers Jr., MD, 100 mg at 02/09/18 2132  •  sodium chloride (OCEAN) nasal spray 1 spray, 1 spray, Each Nare, PRN, Richardson Summers Jr., MD  •  sodium chloride 0.9 % flush 1-10 mL, 1-10 mL, Intravenous, PRN, Richardson Summers Jr., MD  •  valsartan (DIOVAN) tablet 160 mg, 160 mg, Oral, Q PM, Richardson Summers Jr., MD, 160 mg at 02/09/18 2130  •  vancomycin (VANCOCIN) in iso-osmotic dextrose IVPB 1 g  (premix) 200 mL, 1,000 mg, Intravenous, Q12H, Richardson Summers Jr., MD  •  zolpidem (AMBIEN) tablet 10 mg, 10 mg, Oral, Nightly PRN, Richardson Summers Jr., MD, 10 mg at 02/09/18 0039      Review of Systems  All systems were reviewed and are negative unless otherwise stated above in the HPI    Objective   Vital Signs   Temp:  [98.1 °F (36.7 °C)-98.9 °F (37.2 °C)] 98.7 °F (37.1 °C)  Heart Rate:  [67-89] 70  Resp:  [14-16] 16  BP: (128-196)/() 128/72    Physical Exam:   General: awake, alert, NAD, very nice   Head: Normocephalic, atraumatic  Eyes: PERRL, EOMI, no scleral icterus, no conjunctival pallor, no conjunctival hemorrhages.   ENT: MMM, OP clear, no thrush. Fair dentition. NC in nares.  Neck: Supple, no visible thyromegaly  Cardiovascular: NR, RR, no murmurs, rubs, or gallops; no LE edema  Respiratory: Lungs are clear to ascultation bilaterally, no rales or wheezing; normal work of breathing on ambient air  GI: Abdomen is soft, non-tender, non-distended, normal bowel sounds in all four quadrants; no hepatosplenomegaly, no masses palpated  : no Pino catheter present  Musculoskeletal: R ankle wrapped in bandage w/ drain in place  Skin: No rashes, lesions, or embolic phenomenon  Neurological: Alert and oriented x 3, cranial nerves 2-12 grossly intact, motor strength 5/5 in upper extremities  Psychiatric: Normal mood and affect   Lymph: no pre-auricular, post-auricular, submandibular, cervical, supraclavicular  LAD  Vasc: no cyanosis; PIV w/o erythema    Labs:     Lab Results   Component Value Date    WBC 8.67 02/08/2018    HGB 9.6 (L) 02/08/2018    HCT 30.4 (L) 02/08/2018    MCV 95.6 02/08/2018     02/08/2018       Lab Results   Component Value Date    GLUCOSE 105 (H) 02/10/2018    BUN 12 02/10/2018    CREATININE 0.62 02/10/2018    EGFRIFNONA 92 02/10/2018    BCR 19.4 02/10/2018    CO2 28.4 02/10/2018    CALCIUM 8.6 02/10/2018    ALBUMIN 4.00 04/10/2017    LABIL2 1.1 04/10/2017    AST 20 04/10/2017    ALT 15  04/10/2017     Lab Results   Component Value Date    SEDRATE 72 (H) 02/08/2018     Lab Results   Component Value Date    CRP 2.65 (H) 02/08/2018   No results found for: HGBA1C      Microbiology:  2/9 Ankle Cx: NGTD    Radiology (personally reviewed images/report):  none    Assessment/Plan   1. Acute post-operative infection near ankle hardware  -s/p debridement 2/9/18  -f/u pending cultures  -continue vancomycin w/ goal trough 15-20  -stop levofloxacin; start ceftriaxone 2 g IV q24h; avoiding FQs bc of recent tendon operation  -anticipate 4 weeks of antibiotics at [place]. Will likely need PICC but let's see what cultures grow first  -repeat CBC, Crt, CRP in AM    Thank you for this consult. ID will follow. I discussed the patients findings and my recommendations with:

## 2018-02-10 NOTE — THERAPY EVALUATION
Acute Care - Physical Therapy Initial Evaluation  [facility]     Patient Name: Renae Tidwell  : 1935  MRN: [id number]  Today's Date: 2/10/2018   Onset of Illness/Injury or Date of Surgery Date: 18  Date of Referral to PT: 02/10/18         Admit Date: 2018     Visit Dx:    ICD-10-CM ICD-9-CM   1. Impaired mobility Z74.09 799.89   2. 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   • Compression fracture 1970    At L4-L5   • DCIS (ductal carcinoma in situ) of breast     Of left breast post mastectomy   • Depression    • Esophageal ulceration    • GERD (gastroesophageal reflux disease)    • H/O seasonal allergies    • Hypertension    • Hypothyroidism    • MRSA (methicillin resistant Staphylococcus aureus) 2009    Of neck/TREATED AT [facility] BY PCP DR MARTELL LE   • Osteoporosis    • Thoracic outlet syndrome    • Urinary incontinence     WEARS PAD     Past Surgical History:   Procedure Laterality Date   • BASAL CELL CARCINOMA EXCISION      Nose   • BONE RESECTION      Rib resections for thoracic outlet syndrome   • BREAST SURGERY       right mastectomy and needle biopsy; 2013 left mastectomy for DCIS   • CHOLECYSTECTOMY     • EYE SURGERY      Bilateral cataract removal   • HYSTERECTOMY      complete   • KNEE ARTHROSCOPY  2006    Right knee   • MASTECTOMY Right 2012    And needle biopsy    • NASAL SEPTUM SURGERY  1968   • OOPHORECTOMY  1987   • OTHER SURGICAL HISTORY  12/2008    Lumbar forminal and central stenosis requiring surgery   • PLANTAR'S WART EXCISION  2003   • ROTATOR CUFF REPAIR  2007    Right side   • TENDON REPAIR  05/2011    Of right foot   • TONSILLECTOMY  1952   • TOTAL KNEE ARTHROPLASTY Left 2004   • TOTAL SHOULDER ARTHROPLASTY W/ DISTAL CLAVICLE EXCISION Right 6/22/2017    Procedure: RIGHT TOTAL SHOULDER REVERSE ARTHROPLASTY W/ AUGMENT ;  Surgeon: Kofi Rivas MD;  [place] [facility] [place] [facility] [place]:    • WRIST SURGERY  1970's    Four surgeries for ganglion cyst          PT ASSESSMENT (last 72 hours)      PT Evaluation       02/10/18 0900 02/08/18 2009    Rehab Evaluation    Document Type evaluation  -GR     Subjective Information agree to therapy;complains of;pain;fatigue  -GR     Patient Effort, Rehab Treatment adequate  -GR     Symptoms Noted During/After Treatment shortness of breath   83-92 SpO2 on room air  -GR     General Information    Patient Profile Review yes  -GR     Onset of Illness/Injury or Date of Surgery Date 02/08/18  -GR     Pertinent History Of Current Problem s/p I&D R ankle; admit s/p ankle and hindfoot arthrodesis with post-op hematoma/suspeted infection.  -GR     Precautions/Limitations non-weight bearing status   RLE  -GR     Prior Level of Function dependent:;gait;max assist:;transfer   patient admitted from rehab; stand pivots transfer to chair  -GR     Equipment Currently Used at Home walker, rolling;wheelchair  -GR walker, standard;wheelchair  -KJ    Plans/Goals Discussed With patient;other  -GR     Risks Reviewed patient:;other:;nausea/vomiting;dizziness;increased discomfort;change in vital signs  -GR     Benefits Reviewed patient:;other:;improve function;increase strength;increase balance;improve skin integrity  -GR     Barriers to Rehab medically complex  -GR     Living Environment    Lives With  alone  -KJ    Living Arrangements  house  -KJ     Home Accessibility  no concerns  -KJ    Stair Railings at Home  none  -KJ    Type of Financial/Environmental Concern  none  -KJ    Transportation Available  car;family or friend will provide  -KJ    Living Environment Comment patient admit from rehab; plan is to return to rehab prior to home with family in single level home without steps  -GR     Clinical Impression    Date of Referral to PT 02/10/18  -GR     PT Diagnosis Impaired mobility  -GR     Prognosis Fair  -GR     Functional Level At Time Of Evaluation ModA supine>sit with bed rail  -GR     Rehab Potential fair, will monitor progress closely  -GR     Pain Assessment    Pain Assessment 0-10  -GR     Pain Score 10  -GR     Post Pain Score 10  -GR     Pain Type Chronic pain  -GR     Pain Location Back  -GR     Pain Orientation Left  -GR     Pain Intervention(s) Cold applied;Repositioned;MD notified (Comment)  -GR     Response to Interventions no worse  -GR     Vision Assessment/Intervention    Visual Impairment WFL with corrective lenses  -GR     Cognitive Assessment/Intervention    Current Cognitive/Communication Assessment functional  -GR     Orientation Status oriented x 4  -GR     Follows Commands/Answers Questions 100% of the time  -GR     Personal Safety WNL/WFL  -GR     Personal Safety Interventions fall prevention program maintained;gait belt;nonskid shoes/slippers when out of bed  -GR     Short/Long Term Memory intact short term memory;intact long term memory  -GR     ROM (Range of Motion)    General ROM Detail RLE impaired below the knee   -GR     MMT (Manual Muscle Testing)    General MMT Assessment Detail R/L hip and knee 3/5 ankle NT  -GR     Mobility Assessment/Training    Extremity Weight-Bearing Status right lower extremity  -GR     Right Lower Extremity Weight-Bearing non weight-bearing  -GR     Bed Mobility, Assessment/Treatment    Bed Mobility, Roll Left, [place] minimum assist (75% patient effort)  -GR     Bed Mobility, Roll Right,  [place] minimum assist (75% patient effort)  -GR     Bed Mobility, Scoot/Bridge, [place] maximum assist (25% patient effort)  -GR     Bed Mob, Supine to Sit, [place] moderate assist (50% patient effort)  -GR     Bed Mob, Sit to Supine, [place] maximum assist (25% patient effort)  -GR     Bed Mobility, Comment with use of bed rails and HOB elevated 30 degrees per LBP  -GR     Transfer Assessment/Treatment    Transfer, Comment unable to transfer at this time secondary to pain, fatigue  -GR     Gait Assessment/Treatment    Gait, Comment unable to amulate at this time secondary to pain/fatigue  -GR     Positioning and Restraints    Pre-Treatment Position in bed  -GR     Post Treatment Position bed  -GR     In Bed notified nsg;supine;call light within reach;encouraged to call for assist;with family/caregiver;legs elevated;waffle boots/both;heels elevated  -       02/08/18 2007       General Information    Equipment Currently Used at Home none  -KJ       User Key  (r) = Recorded By, (t) = Taken By, (c) = Cosigned By    Initials Name Provider Type     Caden Ortiz PT Physical Therapist    VANIA Nguyen, RN Registered Nurse          Physical Therapy Education     Title: PT OT SLP Therapies (Done)     Topic: Physical Therapy (Done)     Point: Mobility training (Done)    Learning Progress Summary    Learner Readiness Method Response Comment Documented by Status   Patient Acceptance E VU,NR WB status, transfer training, positioning  02/10/18 1007 Done   Family Acceptance E VU,NR WB status, transfer training, positioning  02/10/18 1007 Done               Point: Home exercise program (Done)    Learning Progress Summary    Learner Readiness Method Response Comment Documented by Status   Patient Acceptance E VU,NR WB status, transfer training, positioning  02/10/18 1007 Done   Family Acceptance E VU,NR WB status, transfer training, positioning  02/10/18 1007 Done               Point:  Body mechanics (Done)    Learning Progress Summary    Learner Readiness Method Response Comment Documented by Status   Patient Acceptance E VU,NR WB status, transfer training, positioning GR 02/10/18 1007 Done   Family Acceptance E VU,NR WB status, transfer training, positioning GR 02/10/18 1007 Done               Point: Precautions (Done)    Learning Progress Summary    Learner Readiness Method Response Comment Documented by Status   Patient Acceptance E VU,NR WB status, transfer training, positioning GR 02/10/18 1007 Done   Family Acceptance E VU,NR WB status, transfer training, positioning GR 02/10/18 1007 Done                      User Key     Initials Effective Dates Name Provider Type Discipline    GR 10/03/16 -  Caden Ortiz, PT Physical Therapist PT                PT Recommendation and Plan  Anticipated Discharge Disposition: other (see comments) (subacute rehab)  Planned Therapy Interventions: balance training, bed mobility training, gait training, home exercise program, transfer training, wheelchair management/propulsion training  PT Frequency: 5 times/wk  Plan of Care Review  Plan Of Care Reviewed With: patient, family  Outcome Summary/Follow up Plan: 81 y/o F evaluated by physical therapy s/p I & D of R ankle secondary to post-op hematoma/infection following arthrodesis.  Patient now NWB RLE in ace wrap with drain and c/o L heel pain with preventative waffle boot in place. She requires mod to maxA for supine<>sit transfers and c/o LBP throughout and quickly fatigues after sitting x 4 minutes.  Patient will likely require return to subacute rehab for additional therapy  prior to discharge home.          IP PT Goals       02/10/18 1012 02/10/18 0908       Bed Mobility PT LTG    Bed Mobility PT LTG, Date Established  02/10/18  -GR     Bed Mobility PT LTG, Time to Achieve  5 days  -GR     Bed Mobility PT LTG, Activity Type  all bed mobility  -GR     Bed Mobility PT LTG, [place] Level  conditional  independence  -GR     Bed Mobility PT Goal  LTG, Assist Device  bed rails  -GR     Transfer Training PT LTG    Transfer Training PT LTG, Date Established 02/10/18  -GR      Transfer Training PT LTG, Time to Achieve 5 days  -GR      Transfer Training PT LTG, Activity Type bed to chair /chair to bed  -GR      Transfer Training PT LTG, [place] Level minimum assist (75% patient effort)  -GR      Transfer Training PT LTG, Assist Device walker, rolling  -GR      Transfer Training 2 PT LTG    Transfer Training PT 2 LTG, Date Established 02/10/18  -GR      Transfer Training PT 2 LTG, Time to Achieve 5 days  -GR      Transfer Training PT 2 LTG, Activity Type other (see comments)   supine<>sit  -GR      Transfer Training PT 2 LTG, [place] Level minimum assist (75% patient effort)  -GR        User Key  (r) = Recorded By, (t) = Taken By, (c) = Cosigned By    Initials Name Provider Type    RAGHU Ortiz PT Physical Therapist                Outcome Measures       02/10/18 1000          How much help from another person do you currently need...    Turning from your back to your side while in flat bed without using bedrails? 2  -GR      Moving from lying on back to sitting on the side of a flat bed without bedrails? 2  -GR      Moving to and from a bed to a chair (including a wheelchair)? 1  -GR      Standing up from a chair using your arms (e.g., wheelchair, bedside chair)? 1  -GR      Climbing 3-5 steps with a railing? 1  -GR      To walk in hospital room? 1  -GR      AM-PAC 6 Clicks Score 8  -GR      Functional Assessment    Outcome Measure Options AM-PAC 6 Clicks Basic Mobility (PT)  -GR        User Key  (r) = Recorded By, (t) = Taken By, (c) = Cosigned By    Initials Name Provider Type    RAGHU Ortiz PT Physical Therapist           Time Calculation:         PT Charges       02/10/18 1014          Time Calculation    Start Time 0908  -GR      Stop Time 0934  -GR      Time Calculation (min) 26 min  -GR       PT Received On 02/10/18  -GR      PT - Next Appointment 02/11/18  -GR        User Key  (r) = Recorded By, (t) = Taken By, (c) = Cosigned By    Initials Name Provider Type    GR Caden Ortiz PT Physical Therapist          Therapy Charges for Today     Code Description Service Date Service Provider Modifiers Qty    85746508224 HC PT MOBILITY CURRENT 2/10/2018 Caden Ortiz PT GP, CM 1    58951682083 HC PT MOBILITY PROJECTED 2/10/2018 Caden Ortiz PT GP, CL 1    26439291616 HC PT EVAL MOD COMPLEXITY 1 2/10/2018 Caden Ortiz PT GP 1    52027380628 HC PT THER PROC EA 15 MIN 2/10/2018 Caden Ortiz, PT GP 1          PT G-Codes  PT Professional Judgement Used?: Yes  Outcome Measure Options: AM-PAC 6 Clicks Basic Mobility (PT)  Score: 8  Functional Limitation: Mobility: Walking and moving around  Mobility: Walking and Moving Around Current Status (): At least 80 percent but less than 100 percent impaired, limited or restricted  Mobility: Walking and Moving Around Goal Status (): At least 60 percent but less than 80 percent impaired, limited or restricted      Caden Ortiz PT  2/10/2018

## 2018-02-10 NOTE — PLAN OF CARE
Problem: Patient Care Overview (Adult)  Goal: Plan of Care Review  Outcome: Ongoing (interventions implemented as appropriate)   02/10/18 0810   Coping/Psychosocial Response Interventions   Plan Of Care Reviewed With patient   Outcome Evaluation   Outcome Summary/Follow up Plan ace banage bandage to right lower leg, hemovac, po pain meds effective, voiding vss, educated on importance of monitoring blood pressure at home.     Goal: Adult Individualization and Mutuality  Outcome: Ongoing (interventions implemented as appropriate)   02/10/18 0810   Individualization   Patient Specific Goals adequate pain control assist with mobility, promote adequate skin integrity,   Patient Specific Interventions offer pain meds when needed, assist with mobility, monitor labs     Goal: Discharge Needs Assessment  Outcome: Ongoing (interventions implemented as appropriate)      Problem: Pain, Acute (Adult)  Goal: Acceptable Pain Control/Comfort Level  Outcome: Ongoing (interventions implemented as appropriate)      Problem: Fall Risk (Adult)  Goal: Absence of Falls  Outcome: Ongoing (interventions implemented as appropriate)

## 2018-02-10 NOTE — PLAN OF CARE
Problem: Inpatient Physical Therapy  Goal: Bed Mobility Goal LTG- PT  Outcome: Ongoing (interventions implemented as appropriate)   02/10/18 0908   Bed Mobility PT LTG   Bed Mobility PT LTG, Date Established 02/10/18   Bed Mobility PT LTG, Time to Achieve 5 days   Bed Mobility PT LTG, Activity Type all bed mobility   Bed Mobility PT LTG, [place] Level conditional independence   Bed Mobility PT Goal LTG, Assist Device bed rails     Goal: Transfer Training Goal 1 LTG- PT  Outcome: Ongoing (interventions implemented as appropriate)   02/10/18 1012   Transfer Training PT LTG   Transfer Training PT LTG, Date Established 02/10/18   Transfer Training PT LTG, Time to Achieve 5 days   Transfer Training PT LTG, Activity Type bed to chair /chair to bed   Transfer Training PT LTG, [place] Level minimum assist (75% patient effort)   Transfer Training PT LTG, Assist Device walker, rolling     Goal: Transfer Training Goal 2 LTG- PT  Outcome: Ongoing (interventions implemented as appropriate)   02/10/18 1012   Transfer Training 2 PT LTG   Transfer Training PT 2 LTG, Date Established 02/10/18   Transfer Training PT 2 LTG, Time to Achieve 5 days   Transfer Training PT 2 LTG, Activity Type other (see comments)  (supine<>sit)   Transfer Training PT 2 LTG, [place] Level minimum assist (75% patient effort)

## 2018-02-10 NOTE — PLAN OF CARE
Problem: Patient Care Overview (Adult)  Goal: Plan of Care Review  Outcome: Ongoing (interventions implemented as appropriate)   02/10/18 3894   Coping/Psychosocial Response Interventions   Plan Of Care Reviewed With patient   Patient Care Overview   Progress improving   Outcome Evaluation   Outcome Summary/Follow up Plan increased pain today (baclofen ordered), strict NWB RLE, abx per Dr Portillo, awaiting culture results, plan to dc to rehab when ready probably Monday, educated on bp meds and monitoring      Goal: Adult Individualization and Mutuality  Outcome: Ongoing (interventions implemented as appropriate)      Problem: Mobility, Physical Impaired (Adult)  Goal: Enhanced Mobility Skills  Outcome: Ongoing (interventions implemented as appropriate)      Problem: Pain, Acute (Adult)  Goal: Acceptable Pain Control/Comfort Level  Outcome: Ongoing (interventions implemented as appropriate)      Problem: Fall Risk (Adult)  Goal: Absence of Falls  Outcome: Ongoing (interventions implemented as appropriate)

## 2018-02-11 LAB
CREAT BLD-MCNC: 0.75 MG/DL (ref 0.57–1)
CRP SERPL-MCNC: 5.22 MG/DL (ref 0–0.5)
DEPRECATED RDW RBC AUTO: 46.3 FL (ref 37–54)
ERYTHROCYTE [DISTWIDTH] IN BLOOD BY AUTOMATED COUNT: 13.6 % (ref 11.7–13)
GFR SERPL CREATININE-BSD FRML MDRD: 74 ML/MIN/1.73
HCT VFR BLD AUTO: 28.8 % (ref 35.6–45.5)
HGB BLD-MCNC: 9.2 G/DL (ref 11.9–15.5)
MCH RBC QN AUTO: 30 PG (ref 26.9–32)
MCHC RBC AUTO-ENTMCNC: 31.9 G/DL (ref 32.4–36.3)
MCV RBC AUTO: 93.8 FL (ref 80.5–98.2)
PLATELET # BLD AUTO: 306 10*3/MM3 (ref 140–500)
PMV BLD AUTO: 9.4 FL (ref 6–12)
RBC # BLD AUTO: 3.07 10*6/MM3 (ref 3.9–5.2)
VANCOMYCIN TROUGH SERPL-MCNC: 12.7 MCG/ML (ref 5–20)
WBC NRBC COR # BLD: 8.17 10*3/MM3 (ref 4.5–10.7)

## 2018-02-11 PROCEDURE — 85027 COMPLETE CBC AUTOMATED: CPT | Performed by: INTERNAL MEDICINE

## 2018-02-11 PROCEDURE — 80202 ASSAY OF VANCOMYCIN: CPT | Performed by: ORTHOPAEDIC SURGERY

## 2018-02-11 PROCEDURE — 82565 ASSAY OF CREATININE: CPT | Performed by: INTERNAL MEDICINE

## 2018-02-11 PROCEDURE — 25010000003 CEFTRIAXONE PER 250 MG: Performed by: INTERNAL MEDICINE

## 2018-02-11 PROCEDURE — 25010000002 VANCOMYCIN 10 G RECONSTITUTED SOLUTION: Performed by: ORTHOPAEDIC SURGERY

## 2018-02-11 PROCEDURE — 99232 SBSQ HOSP IP/OBS MODERATE 35: CPT | Performed by: INTERNAL MEDICINE

## 2018-02-11 PROCEDURE — 86140 C-REACTIVE PROTEIN: CPT | Performed by: INTERNAL MEDICINE

## 2018-02-11 RX ADMIN — BACLOFEN 10 MG: 10 TABLET ORAL at 02:59

## 2018-02-11 RX ADMIN — CARVEDILOL 6.25 MG: 6.25 TABLET, FILM COATED ORAL at 17:48

## 2018-02-11 RX ADMIN — LEVOTHYROXINE SODIUM 88 MCG: 88 TABLET ORAL at 10:39

## 2018-02-11 RX ADMIN — VALSARTAN 160 MG: 160 TABLET ORAL at 17:48

## 2018-02-11 RX ADMIN — OXYBUTYNIN CHLORIDE 5 MG: 5 TABLET, EXTENDED RELEASE ORAL at 10:39

## 2018-02-11 RX ADMIN — MISOPROSTOL 200 MCG: 200 TABLET ORAL at 10:39

## 2018-02-11 RX ADMIN — HYDROCODONE BITARTRATE AND ACETAMINOPHEN 1 TABLET: 10; 325 TABLET ORAL at 10:46

## 2018-02-11 RX ADMIN — BACLOFEN 10 MG: 10 TABLET ORAL at 21:39

## 2018-02-11 RX ADMIN — HYDROCODONE BITARTRATE AND ACETAMINOPHEN 1 TABLET: 10; 325 TABLET ORAL at 02:59

## 2018-02-11 RX ADMIN — ASPIRIN 325 MG: 325 TABLET ORAL at 10:40

## 2018-02-11 RX ADMIN — PANTOPRAZOLE SODIUM 40 MG: 40 TABLET, DELAYED RELEASE ORAL at 06:42

## 2018-02-11 RX ADMIN — VANCOMYCIN HYDROCHLORIDE 1250 MG: 100 INJECTION, POWDER, LYOPHILIZED, FOR SOLUTION INTRAVENOUS at 12:08

## 2018-02-11 RX ADMIN — MISOPROSTOL 200 MCG: 200 TABLET ORAL at 21:39

## 2018-02-11 RX ADMIN — POTASSIUM CHLORIDE 10 MEQ: 750 CAPSULE, EXTENDED RELEASE ORAL at 10:39

## 2018-02-11 RX ADMIN — HYDROCODONE BITARTRATE AND ACETAMINOPHEN 1 TABLET: 10; 325 TABLET ORAL at 18:00

## 2018-02-11 RX ADMIN — SERTRALINE 100 MG: 100 TABLET, FILM COATED ORAL at 17:48

## 2018-02-11 RX ADMIN — CEFTRIAXONE SODIUM 2 G: 2 INJECTION, SOLUTION INTRAVENOUS at 14:30

## 2018-02-11 RX ADMIN — FUROSEMIDE 20 MG: 20 TABLET ORAL at 10:39

## 2018-02-11 RX ADMIN — FAMOTIDINE 20 MG: 20 TABLET, FILM COATED ORAL at 10:39

## 2018-02-11 RX ADMIN — CARVEDILOL 6.25 MG: 6.25 TABLET, FILM COATED ORAL at 10:39

## 2018-02-11 RX ADMIN — FLUTICASONE PROPIONATE 2 SPRAY: 50 SPRAY, METERED NASAL at 10:41

## 2018-02-11 RX ADMIN — DOCUSATE SODIUM -SENNOSIDES 2 TABLET: 50; 8.6 TABLET, COATED ORAL at 21:39

## 2018-02-11 NOTE — PROGRESS NOTES
LOS: 1 day     Chief Complaint:  Follow-up ankle infectin    Interval History:  No acute events. She reports mild constant pain worse w/ movement and relieved w/ rest. No fevers. Tolerating antibiotics w/o rash or diarrhea.    Vital Signs  Temp:  [97.5 °F (36.4 °C)-100.5 °F (38.1 °C)] 97.5 °F (36.4 °C)  Heart Rate:  [73-83] 83  Resp:  [16-18] 18  BP: (108-169)/(59-87) 169/87    Physical Exam:  General: awake, alert, NAD  Head: Normocephalic  Eyes: no scleral icterus  ENT: MMM, OP clear, no thrush. Fair dentition.  Neck: Supple  Cardiovascular: NR,  no LE edema  Respiratory: normal work of breathing on ambient air  GI: Abdomen is soft, non-tender, non-distended  : no Pino catheter present  Musculoskeletal: R ankle wrapped in bandage w/ drain in place  Skin: No rashes, lesions, or embolic phenomenon  Neurological: Alert and oriented x 3,  motor strength 5/5 in upper extremities  Psychiatric: Normal mood and affect   Vasc: no cyanosis; PIV w/o erythema    Antibiotics:  •  cefTRIAXone (ROCEPHIN) IVPB 2 g, 2 g, Intravenous, Q24H, Daren Portillo MD, Last Rate: 100 mL/hr at 02/10/18 1314, 2 g at 02/10/18 1314  •  vancomycin (VANCOCIN) in iso-osmotic dextrose IVPB 1 g (premix) 200 mL, 1,000 mg, Intravenous, Q12H, Richardson Summers Jr., MD, 1,000 mg at 02/10/18 2220      LABS:  Micro reviewed today; other AM labs pending  Lab Results   Component Value Date    WBC 8.67 02/08/2018    HGB 9.6 (L) 02/08/2018    HCT 30.4 (L) 02/08/2018    MCV 95.6 02/08/2018     02/08/2018     Lab Results   Component Value Date    K 4.2 02/10/2018     Lab Results   Component Value Date    CREATININE 0.62 02/10/2018     Lab Results   Component Value Date    CRP 2.65 (H) 02/08/2018   No results found for: SYL JENKINS VANCORANDOM    Microbiology:  2/9 Ankle Cx: NGTD     Radiology (personally reviewed images/report):  none    Assessment/Plan   1. Acute post-operative infection near ankle hardware  -s/p debridement  2/9/18  -f/u pending cultures  -continue vancomycin w/ goal trough 15-20  -continue ceftriaxone 2 g IV q24h; avoiding FQs bc of recent tendon operation  -anticipate 4 weeks of antibiotics at MI. Will likely need PICC but let's see what cultures grow first. Maybe tomorrow.  -repeat Crt in AM while on vanco     Thank you for this consult. ID will follow.

## 2018-02-11 NOTE — PLAN OF CARE
Problem: Patient Care Overview (Adult)  Goal: Plan of Care Review  Outcome: Ongoing (interventions implemented as appropriate)   02/11/18 1612   Coping/Psychosocial Response Interventions   Plan Of Care Reviewed With patient   Outcome Evaluation   Outcome Summary/Follow up Plan VSS, Vanc Trough 12.70 today, surgical dressing intact, educated pt on IV Vanc and Trough level prion     Goal: Adult Individualization and Mutuality  Outcome: Ongoing (interventions implemented as appropriate)      Problem: Mobility, Physical Impaired (Adult)  Goal: Enhanced Mobility Skills  Outcome: Ongoing (interventions implemented as appropriate)    Goal: Enhanced Functionality Ability  Outcome: Ongoing (interventions implemented as appropriate)      Problem: Pain, Acute (Adult)  Goal: Acceptable Pain Control/Comfort Level  Outcome: Ongoing (interventions implemented as appropriate)      Problem: Fall Risk (Adult)  Goal: Absence of Falls  Outcome: Ongoing (interventions implemented as appropriate)

## 2018-02-11 NOTE — PLAN OF CARE
Problem: Patient Care Overview (Adult)  Goal: Plan of Care Review  Outcome: Ongoing (interventions implemented as appropriate)   02/11/18 0508   Coping/Psychosocial Response Interventions   Plan Of Care Reviewed With patient;friend   Patient Care Overview   Progress improving   Outcome Evaluation   Outcome Summary/Follow up Plan VSS. Reports PO analgesic effective in managing pain. Peripheral vascular assessment WNL. Surgical dressing intact with no drainage. Scant amount in hemovac. Assist X3 up to BSC to maintain NWN RLE. Verbalizes understanding of all educational topics to include managing hypertension       Problem: Mobility, Physical Impaired (Adult)  Goal: Enhanced Mobility Skills  Outcome: Ongoing (interventions implemented as appropriate)   02/11/18 0508   Mobility, Physical Impaired (Adult)   Enhanced Mobility Skills making progress toward outcome     Goal: Enhanced Functionality Ability  Outcome: Ongoing (interventions implemented as appropriate)   02/11/18 0508   Mobility, Physical Impaired (Adult)   Enhanced Functionality Ability making progress toward outcome       Problem: Pain, Acute (Adult)  Goal: Acceptable Pain Control/Comfort Level  Outcome: Ongoing (interventions implemented as appropriate)   02/11/18 0508   Pain, Acute (Adult)   Acceptable Pain Control/Comfort Level making progress toward outcome       Problem: Fall Risk (Adult)  Goal: Absence of Falls  Outcome: Ongoing (interventions implemented as appropriate)   02/11/18 0508   Fall Risk (Adult)   Absence of Falls achieves outcome

## 2018-02-11 NOTE — PROGRESS NOTES
"Pharmacokinetic Consult - Vancomycin Dosing (Follow-up Note)  Renae Tidwell is on day 2 pharmacy to dose vancomycin for acute post-operative infection near ankle hardware.  Pharmacy dosing vancomycin per Dr. Summers's request. Dr. Portillo is following.  Goal trough: 15-20 mg/L   Duration: 4 weeks with stop date 3/9/18  Other antimicrobials: ceftriaxone 2 g iv q24h  Admit date: 2/8/2018  4:29 PM    Relevant clinical data and objective history reviewed:  82 y.o. 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   • Depression    • Esophageal ulceration    • GERD (gastroesophageal reflux disease)    • H/O seasonal allergies    • Hypertension    • Hypothyroidism    • MRSA (methicillin resistant Staphylococcus aureus) 05/2009    Of neck/TREATED AT [facility] BY PCP DR MARTELL LE   • Osteoporosis    • Thoracic outlet syndrome 1987   • Urinary incontinence     WEARS PAD     Creatinine   Date Value Ref Range Status   02/11/2018 0.75 0.57 - 1.00 mg/dL Final   02/10/2018 0.62 0.57 - 1.00 mg/dL Final   02/08/2018 0.76 0.57 - 1.00 mg/dL Final     BUN   Date Value Ref Range Status   02/10/2018 12 8 - 23 mg/dL Final     Estimated Creatinine Clearance: 64.4 mL/min (by C-G formula based on Cr of 0.75).    Lab Results   Component Value Date    WBC 8.17 02/11/2018    WBC 8.67 02/08/2018    WBC 12.44 (H) 01/20/2018     Temp Readings from Last 3 Encounters:   02/11/18 97.5 °F (36.4 °C) (Oral)   01/22/18 100.5 °F (38.1 °C) " (Oral)   01/18/18 97.2 °F (36.2 °C) (Oral)     Baseline cultures/source/suscetibilit/labs/radiology:  2/9 Wound cx NGTD  2/9 tissue cx NGTD    Anti-Infectives     Ordered     Dose/Rate Route Frequency Start Stop    02/11/18 1059  vancomycin 1250 mg/250 mL 0.9% NS IVPB ([facility])     Ordering Provider:  Richardson Summers Jr., MD    1,250 mg  over 90 Minutes Intravenous Every 12 Hours 02/11/18 1200 03/09/18 2359    02/10/18 0827  cefTRIAXone (ROCEPHIN) IVPB 2 g     Ordering Provider:  Daren Portillo MD    2 g  100 mL/hr over 30 Minutes Intravenous Every 24 Hours 02/10/18 0900 03/09/18 1359    02/09/18 2001  vancomycin (VANCOCIN) in iso-osmotic dextrose IVPB 1 g (premix) 200 mL     Ordering Provider:  Richardson Summers Jr., MD    1,000 mg  over 150 Minutes Intravenous Once 02/09/18 2100 02/10/18 0030    02/09/18 1909  Pharmacy to dose vancomycin     Daren Portillo MD reviewed the order on 02/10/18 0827.   Ordering Provider:  Daren Portillo MD     Does not apply Continuous PRN 02/09/18 1909 03/09/18 2359           Lab Results   Component Value Date    [facility] 12.70 02/11/2018     No results found for: VANCORANDOM    Vancomycin dosing history:   2/9 1627 vancomycin 1g iv once  2/9 2235 vancomycin 1g iv once  2/10 vancomycin 1 g iv q12h   2/11 0957 trough: 12.7 mcg/mL    Assessment/Plan  1. Vancomycin trough level is subtherapeutic at 12.7 mcg/mL. Level drawn ~11hrs after the last dose. Will increase vancomycin to 1250 mg iv q12h.  2. Creatinine at 0.75. Will continue to monitor renal function and draw vancomycin trough level on 2/12 prior to the 3rd dose of new regimen.   3. Encourage hydration to prevent toxic accumulation; monitor for s/sx of toxicity including increase in SCr and decrease in UOP.    Pharmacy will continue to follow daily while on vancomycin and adjust as needed.     Kellie Hicks, Pharm.D.  02/11/18 11:05 AM

## 2018-02-11 NOTE — PROGRESS NOTES
Ortho rounds    Pod 2 I&D right ankle  afeb vss  Doing well  Cultures pending to date  ID following  Neuro intact. Dressing intact  Strict nwb rt lower ext  Asa therapy      bianka De Jesus / dr philip

## 2018-02-12 ENCOUNTER — APPOINTMENT (OUTPATIENT)
Dept: GENERAL RADIOLOGY | Facility: HOSPITAL | Age: 83
End: 2018-02-12
Attending: ORTHOPAEDIC SURGERY

## 2018-02-12 VITALS
RESPIRATION RATE: 16 BRPM | OXYGEN SATURATION: 93 % | BODY MASS INDEX: 35.2 KG/M2 | SYSTOLIC BLOOD PRESSURE: 137 MMHG | HEIGHT: 66 IN | DIASTOLIC BLOOD PRESSURE: 77 MMHG | WEIGHT: 219 LBS | TEMPERATURE: 98 F | HEART RATE: 69 BPM

## 2018-02-12 LAB
ABO + RH BLD: NORMAL
ABO + RH BLD: NORMAL
BACTERIA SPEC AEROBE CULT: NO GROWTH
BACTERIA SPEC AEROBE CULT: NORMAL
BACTERIA SPEC AEROBE CULT: NORMAL
BH BB BLOOD EXPIRATION DATE: NORMAL
BH BB BLOOD EXPIRATION DATE: NORMAL
BH BB BLOOD TYPE BARCODE: 6200
BH BB BLOOD TYPE BARCODE: 6200
BH BB DISPENSE STATUS: NORMAL
BH BB DISPENSE STATUS: NORMAL
BH BB PRODUCT CODE: NORMAL
BH BB PRODUCT CODE: NORMAL
BH BB UNIT NUMBER: NORMAL
BH BB UNIT NUMBER: NORMAL
CREAT BLD-MCNC: 0.64 MG/DL (ref 0.57–1)
CROSSMATCH INTERPRETATION: NORMAL
CROSSMATCH INTERPRETATION: NORMAL
GFR SERPL CREATININE-BSD FRML MDRD: 89 ML/MIN/1.73
GRAM STN SPEC: NORMAL
UNIT  ABO: NORMAL
UNIT  ABO: NORMAL
UNIT  RH: NORMAL
UNIT  RH: NORMAL
VANCOMYCIN TROUGH SERPL-MCNC: 15 MCG/ML (ref 5–20)

## 2018-02-12 PROCEDURE — 99232 SBSQ HOSP IP/OBS MODERATE 35: CPT | Performed by: INTERNAL MEDICINE

## 2018-02-12 PROCEDURE — C1751 CATH, INF, PER/CENT/MIDLINE: HCPCS

## 2018-02-12 PROCEDURE — 25010000002 VANCOMYCIN 10 G RECONSTITUTED SOLUTION: Performed by: ORTHOPAEDIC SURGERY

## 2018-02-12 PROCEDURE — 80202 ASSAY OF VANCOMYCIN: CPT | Performed by: ORTHOPAEDIC SURGERY

## 2018-02-12 PROCEDURE — 25010000003 CEFTRIAXONE PER 250 MG: Performed by: INTERNAL MEDICINE

## 2018-02-12 PROCEDURE — 82565 ASSAY OF CREATININE: CPT | Performed by: INTERNAL MEDICINE

## 2018-02-12 RX ADMIN — HYDROCORTISONE: 25 CREAM TOPICAL at 12:55

## 2018-02-12 RX ADMIN — VANCOMYCIN HYDROCHLORIDE 1250 MG: 100 INJECTION, POWDER, LYOPHILIZED, FOR SOLUTION INTRAVENOUS at 00:27

## 2018-02-12 RX ADMIN — VALSARTAN 160 MG: 160 TABLET ORAL at 16:52

## 2018-02-12 RX ADMIN — HYDROCORTISONE: 25 CREAM TOPICAL at 20:43

## 2018-02-12 RX ADMIN — CARVEDILOL 6.25 MG: 6.25 TABLET, FILM COATED ORAL at 16:52

## 2018-02-12 RX ADMIN — OXYBUTYNIN CHLORIDE 5 MG: 5 TABLET, EXTENDED RELEASE ORAL at 07:58

## 2018-02-12 RX ADMIN — CARVEDILOL 6.25 MG: 6.25 TABLET, FILM COATED ORAL at 07:58

## 2018-02-12 RX ADMIN — FAMOTIDINE 20 MG: 20 TABLET, FILM COATED ORAL at 07:58

## 2018-02-12 RX ADMIN — HYDROCODONE BITARTRATE AND ACETAMINOPHEN 1 TABLET: 10; 325 TABLET ORAL at 20:41

## 2018-02-12 RX ADMIN — HYDROCODONE BITARTRATE AND ACETAMINOPHEN 1 TABLET: 10; 325 TABLET ORAL at 16:51

## 2018-02-12 RX ADMIN — BACLOFEN 10 MG: 10 TABLET ORAL at 10:38

## 2018-02-12 RX ADMIN — MISOPROSTOL 200 MCG: 200 TABLET ORAL at 20:37

## 2018-02-12 RX ADMIN — VANCOMYCIN HYDROCHLORIDE 1250 MG: 100 INJECTION, POWDER, LYOPHILIZED, FOR SOLUTION INTRAVENOUS at 12:51

## 2018-02-12 RX ADMIN — HYDROCODONE BITARTRATE AND ACETAMINOPHEN 1 TABLET: 10; 325 TABLET ORAL at 10:39

## 2018-02-12 RX ADMIN — FLUTICASONE PROPIONATE 2 SPRAY: 50 SPRAY, METERED NASAL at 07:58

## 2018-02-12 RX ADMIN — SERTRALINE 100 MG: 100 TABLET, FILM COATED ORAL at 16:52

## 2018-02-12 RX ADMIN — PANTOPRAZOLE SODIUM 40 MG: 40 TABLET, DELAYED RELEASE ORAL at 06:17

## 2018-02-12 RX ADMIN — POTASSIUM CHLORIDE 10 MEQ: 750 CAPSULE, EXTENDED RELEASE ORAL at 07:58

## 2018-02-12 RX ADMIN — BACLOFEN 10 MG: 10 TABLET ORAL at 20:40

## 2018-02-12 RX ADMIN — ASPIRIN 325 MG: 325 TABLET ORAL at 07:58

## 2018-02-12 RX ADMIN — CEFTRIAXONE SODIUM 2 G: 2 INJECTION, SOLUTION INTRAVENOUS at 16:42

## 2018-02-12 RX ADMIN — LEVOTHYROXINE SODIUM 88 MCG: 88 TABLET ORAL at 07:58

## 2018-02-12 RX ADMIN — Medication 10 ML: at 00:27

## 2018-02-12 NOTE — PROGRESS NOTES
"Orthopaedic Foot and Ankle Surgery  Daily Progress Note    /77 (BP Location: Left arm, Patient Position: Lying)  Pulse 73  Temp 97.1 °F (36.2 °C) (Oral)   Resp 18  Ht 167.6 cm (66\")  Wt 99.3 kg (219 lb)  SpO2 91%  BMI 35.35 kg/m2    Lab Results (last 24 hours)     Procedure Component Value Units Date/Time    CBC (No Diff) [[id number]]  (Abnormal) Collected:  02/11/18 0851    Specimen:  Blood Updated:  02/11/18 0930     WBC 8.17 10*3/mm3      RBC 3.07 (L) 10*6/mm3      Hemoglobin 9.2 (L) g/dL      Hematocrit 28.8 (L) %      MCV 93.8 fL      MCH 30.0 pg      MCHC 31.9 (L) g/dL      RDW 13.6 (H) %      RDW-SD 46.3 fl      MPV 9.4 fL      Platelets 306 10*3/mm3     Creatinine, Serum [[id number]]  (Normal) Collected:  02/11/18 0851    Specimen:  Blood Updated:  02/11/18 0948     Creatinine 0.75 mg/dL      eGFR Non African Amer 74 mL/min/1.73     Narrative:       The MDRD GFR formula is only valid for adults with stable renal function between ages 18 and 70.    C-reactive Protein [[id number]]  (Abnormal) Collected:  02/11/18 0851    Specimen:  Blood Updated:  02/11/18 0948     C-Reactive Protein 5.22 (H) mg/dL     Vancomycin, Trough Vancomycin is scheduled at 1000. Please draw trough 30 minutes prior to hanging dose (don't draw level while medication is infusing). [[id number]]  (Normal) Collected:  02/11/18 0957    Specimen:  Blood Updated:  02/11/18 1053     Vancomycin Trough 12.70 mcg/mL     Creatinine, Serum [[id number]]  (Normal) Collected:  02/12/18 0319    Specimen:  Blood Updated:  02/12/18 0451     Creatinine 0.64 mg/dL      eGFR Non African Amer 89 mL/min/1.73     Narrative:       The MDRD GFR formula is only valid for adults with stable renal function between ages 18 and 70.    Tissue / Bone Culture - Tissue, Ankle, Right [[id number]]  (Normal) Collected:  02/09/18 1629    Specimen:  Tissue from Ankle, Right Updated:  02/12/18 0706     Tissue Culture No growth     Gram Stain Result Many (4+) Red blood " cells      No organisms seen    Wound Culture - Wound, Ankle, Right [[id number]]  (Normal) Collected:  02/09/18 1612    Specimen:  Wound from Ankle, Right Updated:  02/12/18 0706     Wound Culture No growth at 3 days     Gram Stain Result Many (4+) Red blood cells      No organisms seen    Tissue / Bone Culture - Tissue, Ankle, Right [[id number]]  (Normal) Collected:  02/09/18 1613    Specimen:  Tissue from Ankle, Right Updated:  02/12/18 0707     Tissue Culture No growth at 3 days     Gram Stain Result Many (4+) Red blood cells      No organisms seen          Imaging Results (last 24 hours)     ** No results found for the last 24 hours. **          Patient Care Team:  Anne Brooks MD as PCP - General (Internal Medicine)  Corey Pearson MD as Consulting Physician (Hematology and Oncology)  Nate Knapp MD as Referring Physician (Breast Surgery)    SUBJECTIVE  Pain controlled    PHYSICAL EXAM  Resting in NAD  Incision clean, dry, intact  Toes warm, perfused, brisk capillary refill  Flexes/extends toes  SILT over toes       Active Problems:    Postoperative infection      PLAN / DISPOSITION:  POD 3 s/p I&D of right ankle    OR cultures NGTD; this is surprising given the amount of drainage/fluid collection and the fact she was on no antibiotics prior to surgery.    Greatly appreciate Dr. Portillo's ID assistance, continue broad spectrum abx for now pending culture data      1. Pain control: Orals  2.  Antibiotics: vanc/ceftriaxone pending culture data  3.  PT: Strict NWB RLE; waffle boots when in bed to protect wound/heel, in CAM boot when out of bed  4. DVT: ASA 325mg plus PRINCESS/SCDs, mobilization; will refrain from Lovenox given postop hematoma  5. Dispo: pending final antibiotic/ID plan, potentially today, follow up 7 -10 days with me at [facility] ([phone] to make appointment)    Richardson Summers Jr, MD  02/12/18  7:47 AM

## 2018-02-12 NOTE — PROGRESS NOTES
LOS: 2 days     Chief Complaint:  Follow-up ankle infectin    Interval History:  No acute events. Cultures finalized negative. Tolerating antibiotics w/o rash or diarrhea.    ROS: no n/v; no chest pain    Vital Signs  Temp:  [97.1 °F (36.2 °C)-99.1 °F (37.3 °C)] 97.1 °F (36.2 °C)  Heart Rate:  [72-93] 73  Resp:  [18-22] 18  BP: (118-159)/(62-87) 145/77    Physical Exam:  General: awake, alert, NAD  Head: Normocephalic  Eyes: no scleral icterus  ENT: MMM, OP clear, no thrush. Fair dentition.  Neck: Supple  Cardiovascular: NR,  no LE edema  Respiratory: normal work of breathing on ambient air  GI: Abdomen is soft, non-tender, non-distended  : no Pino catheter present  Musculoskeletal: R ankle wrapped in bandage; drain removed  Skin: No rashes, lesions, or embolic phenomenon  Neurological: Alert and oriented x 3,  motor strength 5/5 in upper extremities  Psychiatric: Normal mood and affect   Vasc: no cyanosis; PIV w/o erythema    Antibiotics:  •  cefTRIAXone 2 g IV q24h  •  Vancomycin 1250 mg IV q12h      LABS:  CBC, Crt, micro reviewed today  Lab Results   Component Value Date    WBC 8.17 02/11/2018    HGB 9.2 (L) 02/11/2018    HCT 28.8 (L) 02/11/2018    MCV 93.8 02/11/2018     02/11/2018     Lab Results   Component Value Date    K 4.2 02/10/2018     Lab Results   Component Value Date    CREATININE 0.64 02/12/2018     Lab Results   Component Value Date    CRP 5.22 (H) 02/11/2018     Lab Results   Component Value Date    VANCOTROUGH 12.70 02/11/2018       Microbiology:  2/9 Ankle Cx: negative, final     Radiology (personally reviewed images/report):  none    Assessment/Plan   1. Acute post-operative infection near ankle hardware - culture negative  -s/p debridement 2/9/18; surprisingly OR cultures negative  -continue vancomycin 1250 mg IV q12h w/ goal trough 15-20 x 4 weeks  -continue ceftriaxone 2 g IV q24h x 4 weeks  -stop date 3/9/18  -weekly CBC w/ diff, BMP, VTr, CRP faxed to me at [phone] -f/u with  me in ID clinic 3/9/18  -PICC today     Thank you for allowing me to be involved in the care of this patient. Infectious diseases will sign off at this time with antibiotics plan in place but please call me at [phone] if any further questions.

## 2018-02-12 NOTE — PROGRESS NOTES
Discharge Planning Assessment  [facility]     Patient Name: Renae Tidwell  MRN: [id number]  Today's Date: 2/12/2018    Admit Date: 2/8/2018          Discharge Needs Assessment       02/12/18 1438    Living Environment    Lives With alone    Living Arrangements extended care facility            Discharge Plan       02/12/18 1438    Case Management/Social Work Plan    Plan [facility]     Patient/Family In Agreement With Plan yes    Additional Comments Met w/ pt at the bedside, verified facesheet and d/c plan. Pt plans to return to [facility] via [facility]. [facility]/[place] notified and is expecting pt to return. Faxed [facility] d/c summary and ID orders for IV abx [phone].      Final Note    Final Note d/c to [facility] via [facility], [facility] notified of d/c and faxed d/c summary/IV abx orders.         Discharge Placement     Facility/Agency Request Status Selected? Address Phone Number Fax Number    [facility] Accepted    Yes [address], [place] [address] [phone]                 Demographic Summary     None            Functional Status     None            Psychosocial     None            Abuse/Neglect     None            Legal     None            Substance Abuse     None            Patient Forms       02/12/18 1438    Patient Forms    Provider Choice List Delivered    Delivered to Patient    Method of delivery In person          Celine Whitt RN

## 2018-02-12 NOTE — PLAN OF CARE
Problem: Patient Care Overview (Adult)  Goal: Plan of Care Review  Outcome: Ongoing (interventions implemented as appropriate)      Problem: Pain, Acute (Adult)  Goal: Acceptable Pain Control/Comfort Level  Outcome: Ongoing (interventions implemented as appropriate)   02/12/18 0341   Pain, Acute (Adult)   Acceptable Pain Control/Comfort Level making progress toward outcome       Problem: Skin Integrity Impairment, Risk/Actual (Adult)  Goal: Identify Related Risk Factors and Signs and Symptoms  Outcome: Outcome(s) achieved Date Met: 02/12/18 02/12/18 0341   Skin Integrity Impairment, Risk/Actual   Skin Integrity Impairment, Risk/Actual: Related Risk Factors age extremes;immobility;surgery/procedure     Goal: Skin Integrity/Wound Healing  Outcome: Ongoing (interventions implemented as appropriate)   02/12/18 0341   Skin Integrity Impairment, Risk/Actual (Adult)   Skin Integrity/Wound Healing making progress toward outcome

## 2018-02-12 NOTE — SIGNIFICANT NOTE
02/12/18 1452   PT Discharge Summary   Reason for Discharge Discharge from facility   Discharge Destination Home with assist

## 2018-02-12 NOTE — DISCHARGE SUMMARY
" Orthopedic Discharge Summary      Patient: Renae Tidwell      YOB: 1935    Medical Record Number: [id number]    Attending Physician: Richardson Summers Jr., MD  Consulting Physician(s):   Date of Admission: 2/8/2018  4:29 PM  Date of Discharge: 2/12/18      Patient Active Problem List   Diagnosis   • Iron deficiency anemia   • Malignant neoplasm of female breast   • Rotator cuff insufficiency of right shoulder   • Ankle arthritis   • Postoperative infection     Status Post: RT ANKLE INCISION AND DRAINAGE       Allergies   Allergen Reactions   • Aspirin Nausea And Vomiting   • Grass    • Morphine And Related Nausea And Vomiting   • Other      Trees   • Adhesive Tape Rash       Current Medications:   Renae Tidwell   Home Medication Instructions ROSEANNE:[id number]    Printed on:02/12/18 0749   Medication Information                      albuterol (PROVENTIL HFA;VENTOLIN HFA) 108 (90 BASE) MCG/ACT inhaler  Inhale 2 puffs Every 4 (Four) Hours As Needed.             aspirin  MG EC tablet  Take 1 tablet by mouth Daily.             carvedilol (COREG) 6.25 MG tablet  Take 6.25 mg by mouth 2 (two) times a day with meals.             Cholecalciferol (VITAMIN D-3 PO)  Take 1 tablet by mouth Daily. HOLD PRIOR TO SURG             dexlansoprazole (DEXILANT) 60 MG capsule  Take 60 mg by mouth Every Evening.             furosemide (LASIX) 20 MG tablet  Take 20 mg by mouth 1 (One) Time Per Week. ON SUNDAYS             HYDROcodone-acetaminophen (NORCO)  MG per tablet  Take 1 tablet by mouth Every 4 (Four) Hours As Needed for Moderate Pain  or Severe Pain  (1 tablet pain 3-6/10, 2 tablet pain 7-10/10).             levothyroxine (SYNTHROID, LEVOTHROID) 88 MCG tablet  Take 88 mcg by mouth Daily. DOES NOT TAKE ON Sunday\"S             misoprostol (CYTOTEC) 200 MCG tablet  Take 200 mcg by mouth 2 (two) times a day.             mometasone (NASONEX) 50 MCG/ACT nasal spray  2 sprays into each nostril as needed.           "   Multiple Vitamins-Minerals (OCUVITE PO)  Take 1 tablet by mouth Daily. HOLD PRIOR TO SURG             potassium chloride (K-DUR) 10 MEQ CR tablet  Take 10 mEq by mouth 1 (One) Time Per Week.             raNITIdine (ZANTAC) 300 MG tablet  Take 300 mg by mouth Every Night.             sertraline (ZOLOFT) 100 MG tablet  Take 100 mg by mouth every evening.             tolterodine LA (DETROL LA) 4 MG 24 hr capsule  Take 4 mg by mouth Daily As Needed.             valsartan (DIOVAN) 160 MG tablet  Take 160 mg by mouth Every Evening.             zolpidem (AMBIEN) 10 MG tablet  Take 10 mg by mouth at night as needed for sleep.                     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   • Compression fracture 1970    At L4-L5   • DCIS (ductal carcinoma in situ) of breast     Of left breast post mastectomy   • Depression    • Esophageal ulceration    • GERD (gastroesophageal reflux disease)    • H/O seasonal allergies    • Hypertension    • Hypothyroidism    • MRSA (methicillin resistant Staphylococcus aureus) 05/2009    Of neck/TREATED AT [facility] BY PCP DR MARTELL LE   • Osteoporosis    • Thoracic outlet syndrome 1987   • Urinary incontinence     WEARS PAD     Past Surgical History:   Procedure Laterality Date   • BASAL CELL CARCINOMA EXCISION  2010    Nose   • BONE RESECTION  1987    Rib resections for thoracic outlet syndrome   • BREAST SURGERY      2012 right mastectomy and needle biopsy; 04/2013 left mastectomy for DCIS   • CHOLECYSTECTOMY  2003   • EYE SURGERY  2006    Bilateral cataract removal   • HYSTERECTOMY  1987    complete   • KNEE ARTHROSCOPY  2006    Right knee    • MASTECTOMY Right 2012    And needle biopsy   • NASAL SEPTUM SURGERY  1968   • OOPHORECTOMY  1987   • OTHER SURGICAL HISTORY  12/2008    Lumbar forminal and central stenosis requiring surgery   • PLANTAR'S WART EXCISION  2003   • ROTATOR CUFF REPAIR  2007    Right side   • TENDON REPAIR  05/2011    Of right foot   • TONSILLECTOMY  1952   • TOTAL KNEE ARTHROPLASTY Left 2004   • TOTAL SHOULDER ARTHROPLASTY W/ DISTAL CLAVICLE EXCISION Right 6/22/2017    Procedure: RIGHT TOTAL SHOULDER REVERSE ARTHROPLASTY W/ AUGMENT ;  Surgeon: Kofi Rivas MD;  [place] [facility] [place] [facility] [place]  [place]:    • WRIST SURGERY  1970's    Four surgeries for ganglion cyst     Social History     Occupational History   •  [employer]     Works in pastoral care     Social History Main Topics   • Smoking status: Never Smoker   • Smokeless tobacco: Never Used   • Alcohol use Yes      Comment: Rare   • Drug use: No   • Sexual activity: Not on file      Social History     Social History Narrative     Family History   Problem Relation Age of Onset   • No Known Problems Mother    • Lung cancer Father 62   • Lung cancer Sister    • Melanoma Brother    • Cancer Sister      Unknown cancer   • Malig Hyperthermia Neg Hx          Physical Exam: 82 y.o. female  General Appearance:    Alert, cooperative, in no acute distress                      Vitals:    02/11/18 1948 02/11/18 2257 02/12/18 0400 02/12/18 0735   BP: 129/76 118/70 128/62 145/77   BP Location: Left arm Left arm Left arm Left arm   Patient Position: Lying Lying Lying Lying   Pulse: 77 72 73 73   Resp: 20 20 18 18   Temp: 99.1 °F (37.3 °C) 98.7 °F (37.1 °C) 97.9 °F (36.6 °C) 97.1 °F (36.2 °C)   TempSrc: Oral Oral Oral Oral   SpO2: 95%  94% 91%   Weight:       Height:                On the day of discharge: Resting in NAD  Incision clean, dry, intact  Toes warm, perfused, brisk capillary refill  Flexes/extends toes  SILT over toes                                                                    Hospital Course:  82 y.o. female admitted to [facility] [place] to services of Richardson Summers Jr., MD with Postoperative infection [T81.4XXA]  Postoperative infection [T81.4XXA] on 2/8/2018 and underwent RT ANKLE INCISION AND DRAINAGE   Per Richardson Summers Jr., MD.      Pain was initially controlled with oral pain medication with IV for breakthrough. Aspirin 325mg daily was initiated for DVT propylaxis.  The patient underwent mobilization therapy with physical therapy remaining NWB on the operative extremity.  Opioids were titrated to achieve appropriate pain management to allow for participation in mobilization exercises. Vital signs are now stable. The incision remains intact without signs or symptoms of infection. Operative extremity neurovascular status remains intact. No transfusions of blood products were necessary postoperatively.  Infectious Disease was consulted for antibiotic plan.    Appropriate education re: incision care, activity levels, medications, and follow up visits was completed and all questions were answered.  The patient was tolerating a regular diet, pain was controlled with oral pain medication, and cleared by physical therapy.    The patient is now deemed stable for discharge to rehab facility.      DIAGNOSTIC TESTS:     Admission on 02/08/2018   Component Date Value Ref Range Status   • C-Reactive Protein 02/08/2018 2.65* 0.00 - 0.50 mg/dL Final   • Sed Rate 02/08/2018 72* 0 - 30 mm/hr Final   • WBC 02/08/2018 8.67  4.50 - 10.70 10*3/mm3 Final   • RBC 02/08/2018 3.18* 3.90 - 5.20 10*6/mm3 Final   • Hemoglobin 02/08/2018 9.6* 11.9 - 15.5 g/dL Final   • Hematocrit 02/08/2018 30.4* 35.6 - 45.5 % Final   • MCV 02/08/2018 95.6  80.5 - 98.2 fL Final   • MCH 02/08/2018 30.2  26.9 - 32.0 pg Final   • MCHC 02/08/2018 31.6* 32.4 - 36.3 g/dL Final   • RDW 02/08/2018 13.6* 11.7 - 13.0 % Final   • RDW-SD 02/08/2018 46.9  37.0 - 54.0 fl Final   • MPV 02/08/2018 9.1  6.0 - 12.0 fL Final   •  Platelets 02/08/2018 370  140 - 500 10*3/mm3 Final   • Glucose 02/08/2018 112* 65 - 99 mg/dL Final   • BUN 02/08/2018 18  8 - 23 mg/dL Final   • Creatinine 02/08/2018 0.76  0.57 - 1.00 mg/dL Final   • Sodium 02/08/2018 132* 136 - 145 mmol/L Final   • Potassium 02/08/2018 4.3  3.5 - 5.2 mmol/L Final   • Chloride 02/08/2018 94* 98 - 107 mmol/L Final   • CO2 02/08/2018 27.6  22.0 - 29.0 mmol/L Final   • Calcium 02/08/2018 8.7  8.6 - 10.5 mg/dL Final   • eGFR Non  Amer 02/08/2018 73  >60 mL/min/1.73 Final   • BUN/Creatinine Ratio 02/08/2018 23.7  7.0 - 25.0 Final   • Anion Gap 02/08/2018 10.4  mmol/L Final   • Protime 02/08/2018 15.4* 11.7 - 14.2 Seconds Final   • INR 02/08/2018 1.24* 0.90 - 1.10 Final   • PTT 02/08/2018 28.4  22.7 - 35.4 seconds Final   • ABO Type 02/08/2018 A   Final   • RH type 02/08/2018 Positive   Final   • Antibody Screen 02/08/2018 Negative   Final   • Product Code 02/12/2018 [id number]   Final   • Unit Number 02/12/2018 [id number]   Final   • UNIT  ABO 02/12/2018 A   Final   • UNIT  RH 02/12/2018 POS   Final   • CROSSMATCH 1 INTERPRETATION 02/12/2018 Compatible   Final   • Dispense Status 02/12/2018 RE   Final   • Blood Type 02/12/2018 APOS   Final   • Blood Expiration Date 02/12/2018 201803012359   Final   • Blood Type Barcode 02/12/2018 6200   Final   • Product Code 02/12/2018 [id number]   Final   • Unit Number 02/12/2018 [id number]   Final   • UNIT  ABO 02/12/2018 A   Final   • UNIT  RH 02/12/2018 POS   Final   • CROSSMATCH 1 INTERPRETATION 02/12/2018 Compatible   Final   • Dispense Status 02/12/2018 RE   Final   • Blood Type 02/12/2018 APOS   Final   • Blood Expiration Date 02/12/2018 132786425645   Final   • Blood Type Barcode 02/12/2018 6200   Final   • Anti-E 02/08/2018 ANTI-E   Final   • Wound Culture 02/09/2018 No growth at 3 days   Final   • Gram Stain Result 02/09/2018 Many (4+) Red blood cells   Final   • Gram Stain Result 02/09/2018 No organisms seen   Final   • Tissue  Culture 02/09/2018 No growth at 3 days   Final   • Gram Stain Result 02/09/2018 Many (4+) Red blood cells   Final   • Gram Stain Result 02/09/2018 No organisms seen   Final   • Tissue Culture 02/09/2018 No growth   Final   • Gram Stain Result 02/09/2018 Many (4+) Red blood cells   Final   • Gram Stain Result 02/09/2018 No organisms seen   Final   • Glucose 02/10/2018 105* 65 - 99 mg/dL Final   • BUN 02/10/2018 12  8 - 23 mg/dL Final   • Creatinine 02/10/2018 0.62  0.57 - 1.00 mg/dL Final   • Sodium 02/10/2018 135* 136 - 145 mmol/L Final   • Potassium 02/10/2018 4.2  3.5 - 5.2 mmol/L Final   • Chloride 02/10/2018 97* 98 - 107 mmol/L Final   • CO2 02/10/2018 28.4  22.0 - 29.0 mmol/L Final   • Calcium 02/10/2018 8.6  8.6 - 10.5 mg/dL Final   • eGFR Non African Amer 02/10/2018 92  >60 mL/min/1.73 Final   • BUN/Creatinine Ratio 02/10/2018 19.4  7.0 - 25.0 Final   • Anion Gap 02/10/2018 9.6  mmol/L Final   • WBC 02/11/2018 8.17  4.50 - 10.70 10*3/mm3 Final   • RBC 02/11/2018 3.07* 3.90 - 5.20 10*6/mm3 Final   • Hemoglobin 02/11/2018 9.2* 11.9 - 15.5 g/dL Final   • Hematocrit 02/11/2018 28.8* 35.6 - 45.5 % Final   • MCV 02/11/2018 93.8  80.5 - 98.2 fL Final   • MCH 02/11/2018 30.0  26.9 - 32.0 pg Final   • MCHC 02/11/2018 31.9* 32.4 - 36.3 g/dL Final   • RDW 02/11/2018 13.6* 11.7 - 13.0 % Final   • RDW-SD 02/11/2018 46.3  37.0 - 54.0 fl Final   • MPV 02/11/2018 9.4  6.0 - 12.0 fL Final   • Platelets 02/11/2018 306  140 - 500 10*3/mm3 Final   • Creatinine 02/11/2018 0.75  0.57 - 1.00 mg/dL Final   • eGFR Non African Amer 02/11/2018 74  >60 mL/min/1.73 Final   • C-Reactive Protein 02/11/2018 5.22* 0.00 - 0.50 mg/dL Final   • Vancomycin Trough 02/11/2018 12.70  5.00 - 20.00 mcg/mL Final   • Creatinine 02/12/2018 0.64  0.57 - 1.00 mg/dL Final   • eGFR Non African Amer 02/12/2018 89  >60 mL/min/1.73 Final       Xr Ankle 3+ View Right    Result Date: 1/19/2018  Narrative: RIGHT ANKLE  HISTORY: Right ankle fusion.  RIGHT ANKLE:  "Eight intraoperative views.  FINDINGS: Imaging demonstrates subtalar and tibiotalar fusion with retrograde placement of a nail transfixing the subtalar and tibiotalar joints. Cannulated screws transfix the calcaneus and there is also fusion device overlying the anterior talus. Exam performed for intraoperative localization and control.  FLUOROSCOPY TIME:   Five minutes 45 seconds.  This report was finalized on 1/19/2018 7:18 PM by Dr. Caden Xiao MD.      Fl C Arm During Surgery    Result Date: 1/19/2018  Narrative: This procedure was auto-finalized with no dictation required.      Discharge and Follow up Instructions:     Remain NWB on the operative extremity.    Dry dressing changes as needed.  In the soft waffle boot when in bed, in CAM boot when out of bed/mobilizing    Elevate the extremity as much as possible (\"toes above the level of the nose\").    Try to get up at least once per hour while awake to help prevent blood clots.    Follow up in 2 weeks with Dr. Summers at [facility] ([phone]).    Date: 2/12/2018    Richardson Summers Jr, MD            "

## 2018-02-12 NOTE — PROGRESS NOTES
"Pharmacokinetic Consult - Vancomycin Dosing (Follow-up Note)    Renae Tidwell is on day 4 pharmacy to dose vancomycin for post-operative infection near ankle hardware per Dr. Summers' request. Goal trough: 15-20 mcg/mL.     Duration of Therapy: 4 weeks (per ID-stop date 3/9/18)    Other antimicrobials: Ceftriaxone 2g IV q24h     Relevant clinical data and objective history reviewed:  82 y.o. female 167.6 cm (66\") 99.3 kg (219 lb)  Patient is approximately 3 weeks status post extensive right ankle and hindfoot fusion. She has been having significant drainage from her incision concerning for infection. Patient is now s/p I&D of right ankle on 2/9. Dr. Portillo from ID is on the case-will follow-up with patient when antibiotics are complete.     Creatinine   Date Value Ref Range Status   02/12/2018 0.64 0.57 - 1.00 mg/dL Final   02/11/2018 0.75 0.57 - 1.00 mg/dL Final   02/10/2018 0.62 0.57 - 1.00 mg/dL Final     BUN   Date Value Ref Range Status   02/10/2018 12 8 - 23 mg/dL Final     Estimated Creatinine Clearance: 64.4 mL/min (by C-G formula based on Cr of 0.64).    Lab Results   Component Value Date    WBC 8.17 02/11/2018     Temp Readings from Last 3 Encounters:   02/12/18 97.3 °F (36.3 °C) (Oral)     Baseline culture/source/susceptibility:   2/9: Wound culture-no growth (final)  2/9: Tissue/Bone cultures x 2-no growth (final)    Anti-Infectives     Ordered     Dose/Rate Route Frequency Start Stop    02/11/18 1059  vancomycin 1250 mg/250 mL 0.9% NS IVPB ([facility])     Ordering Provider:  Richardson Summers Jr., MD    1,250 mg  over 90 Minutes Intravenous Every 12 Hours 02/11/18 1200 03/09/18 2359    02/10/18 0827  cefTRIAXone (ROCEPHIN) IVPB 2 g     Ordering Provider:  Daren Portillo MD    2 g  100 mL/hr over 30 Minutes Intravenous Every 24 Hours 02/10/18 0900 03/09/18 1359      Lab Results   Component Value Date    VANCOTROUGH 15.00 02/12/2018     Assessment/Plan    1. Vancomycin is currently dosed at 1250 mg " (~13 mg/kg) IV q12. Trough came back at 15 mcg/mL (~11.5 hrs post-dose)-within goal range. Would not increase dose due to age and BMI, since patient may start to accumulate drug during 4 week regimen.     2. Will monitor serum creatinine at least every 48 hours per dosing recommendations. SCr remains stable today at 0.64. Would recommend obtaining vancomycin trough every week after patient is discharged.    3. Encourage hydration as allowed by MD to help prevent toxic accumulation; monitor for s/sxn of toxicity including increase in SCr and decrease in UOP.     4. Pharmacy will continue to follow daily while on vancomycin and adjust as needed.     Lacy Alves, Pharm.D., BCPS

## 2018-02-20 ENCOUNTER — TELEPHONE (OUTPATIENT)
Dept: INFECTIOUS DISEASES | Facility: CLINIC | Age: 83
End: 2018-02-20

## 2018-02-20 NOTE — TELEPHONE ENCOUNTER
Phone with Gene Kan at [facility]. I had Dr. Mendiola look at patient's labs since Dr. Portillo is out of office. Her last Creatinine on 2/12/18 was normal and today was 2.37 (she is on IV Vanco and CTX). Her Vanco trough was 49. I informed Gene Messer, per Dr. Mendiola, to hold all IV ABX and repeat all labs on Thursday. I told them to encourage fluids as well. Labs will be rechecked on Thursday and results faxed to us ASAP. Shwetha Vuong RN

## 2018-02-23 ENCOUNTER — APPOINTMENT (OUTPATIENT)
Dept: CARDIOLOGY | Facility: HOSPITAL | Age: 83
End: 2018-02-23
Attending: HOSPITALIST

## 2018-02-23 ENCOUNTER — APPOINTMENT (OUTPATIENT)
Dept: ULTRASOUND IMAGING | Facility: HOSPITAL | Age: 83
End: 2018-02-23
Attending: INTERNAL MEDICINE

## 2018-02-23 ENCOUNTER — APPOINTMENT (OUTPATIENT)
Dept: CT IMAGING | Facility: HOSPITAL | Age: 83
End: 2018-02-23
Attending: INTERNAL MEDICINE

## 2018-02-23 ENCOUNTER — HOSPITAL ENCOUNTER (INPATIENT)
Facility: HOSPITAL | Age: 83
LOS: 5 days | Discharge: NURSING, REHAB OR LONG-TERM CARE | End: 2018-02-28
Attending: INTERNAL MEDICINE | Admitting: INTERNAL MEDICINE

## 2018-02-23 ENCOUNTER — APPOINTMENT (OUTPATIENT)
Dept: GENERAL RADIOLOGY | Facility: HOSPITAL | Age: 83
End: 2018-02-23

## 2018-02-23 ENCOUNTER — APPOINTMENT (OUTPATIENT)
Dept: NUCLEAR MEDICINE | Facility: HOSPITAL | Age: 83
End: 2018-02-23

## 2018-02-23 DIAGNOSIS — R26.89 DECREASED MOBILITY: Primary | ICD-10-CM

## 2018-02-23 PROBLEM — G93.41 METABOLIC ENCEPHALOPATHY: Status: ACTIVE | Noted: 2018-02-23

## 2018-02-23 PROBLEM — Z66 DNR (DO NOT RESUSCITATE): Status: ACTIVE | Noted: 2018-02-23

## 2018-02-23 PROBLEM — I10 ESSENTIAL HYPERTENSION: Status: ACTIVE | Noted: 2018-02-23

## 2018-02-23 PROBLEM — R06.00 DYSPNEA: Status: ACTIVE | Noted: 2018-02-23

## 2018-02-23 PROBLEM — N17.9 ACUTE KIDNEY INJURY (HCC): Status: ACTIVE | Noted: 2018-02-23

## 2018-02-23 PROBLEM — I26.99 ACUTE PULMONARY EMBOLISM (HCC): Status: ACTIVE | Noted: 2018-02-23

## 2018-02-23 PROBLEM — R79.89 ELEVATED D-DIMER: Status: ACTIVE | Noted: 2018-02-23

## 2018-02-23 LAB
ALBUMIN SERPL-MCNC: 3.2 G/DL (ref 3.5–5.2)
ALBUMIN/GLOB SERPL: 0.9 G/DL
ALP SERPL-CCNC: 95 U/L (ref 39–117)
ALT SERPL W P-5'-P-CCNC: 9 U/L (ref 1–33)
ANION GAP SERPL CALCULATED.3IONS-SCNC: 8.1 MMOL/L
APTT PPP: 121 SECONDS (ref 22.7–35.4)
APTT PPP: 38.2 SECONDS (ref 22.7–35.4)
APTT PPP: 50 SECONDS (ref 22.7–35.4)
AST SERPL-CCNC: 12 U/L (ref 1–32)
BACTERIA UR QL AUTO: NORMAL /HPF
BASOPHILS # BLD AUTO: 0.1 10*3/MM3 (ref 0–0.2)
BASOPHILS NFR BLD AUTO: 1 % (ref 0–1.5)
BH CV LOW VAS LEFT DISTAL FEMORAL SPONT: 1
BH CV LOW VAS LEFT GASTRONEMIUS VESSEL: 1
BH CV LOW VAS LEFT PERONEAL VESSEL: 1
BH CV LOW VAS LEFT POPLITEAL SPONT: 1
BH CV LOW VAS LEFT POSTERIOR TIBIAL VESSEL: 1
BH CV LOW VAS RIGHT GASTRONEMIUS VESSEL: 1
BH CV LOWER VASCULAR LEFT COMMON FEMORAL AUGMENT: NORMAL
BH CV LOWER VASCULAR LEFT COMMON FEMORAL COMPETENT: NORMAL
BH CV LOWER VASCULAR LEFT COMMON FEMORAL COMPRESS: NORMAL
BH CV LOWER VASCULAR LEFT COMMON FEMORAL PHASIC: NORMAL
BH CV LOWER VASCULAR LEFT COMMON FEMORAL SPONT: NORMAL
BH CV LOWER VASCULAR LEFT DISTAL FEMORAL COMPRESS: NORMAL
BH CV LOWER VASCULAR LEFT DISTAL FEMORAL THROMBUS: NORMAL
BH CV LOWER VASCULAR LEFT GASTRONEMIUS COMPRESS: NORMAL
BH CV LOWER VASCULAR LEFT GASTRONEMIUS THROMBUS: NORMAL
BH CV LOWER VASCULAR LEFT GREATER SAPH AK COMPRESS: NORMAL
BH CV LOWER VASCULAR LEFT GREATER SAPH BK COMPRESS: NORMAL
BH CV LOWER VASCULAR LEFT LESSER SAPH COMPRESS: NORMAL
BH CV LOWER VASCULAR LEFT MID FEMORAL AUGMENT: NORMAL
BH CV LOWER VASCULAR LEFT MID FEMORAL COMPETENT: NORMAL
BH CV LOWER VASCULAR LEFT MID FEMORAL COMPRESS: NORMAL
BH CV LOWER VASCULAR LEFT MID FEMORAL PHASIC: NORMAL
BH CV LOWER VASCULAR LEFT MID FEMORAL SPONT: NORMAL
BH CV LOWER VASCULAR LEFT PERONEAL COMPRESS: NORMAL
BH CV LOWER VASCULAR LEFT PERONEAL THROMBUS: NORMAL
BH CV LOWER VASCULAR LEFT POPLITEAL AUGMENT: NORMAL
BH CV LOWER VASCULAR LEFT POPLITEAL COMPETENT: NORMAL
BH CV LOWER VASCULAR LEFT POPLITEAL COMPRESS: NORMAL
BH CV LOWER VASCULAR LEFT POPLITEAL PHASIC: NORMAL
BH CV LOWER VASCULAR LEFT POPLITEAL SPONT: NORMAL
BH CV LOWER VASCULAR LEFT POPLITEAL THROMBUS: NORMAL
BH CV LOWER VASCULAR LEFT POSTERIOR TIBIAL COMPRESS: NORMAL
BH CV LOWER VASCULAR LEFT POSTERIOR TIBIAL THROMBUS: NORMAL
BH CV LOWER VASCULAR LEFT PROXIMAL FEMORAL COMPRESS: NORMAL
BH CV LOWER VASCULAR LEFT SAPHENOFEMORAL JUNCTION AUGMENT: NORMAL
BH CV LOWER VASCULAR LEFT SAPHENOFEMORAL JUNCTION COMPRESS: NORMAL
BH CV LOWER VASCULAR LEFT SAPHENOFEMORAL JUNCTION PHASIC: NORMAL
BH CV LOWER VASCULAR LEFT SAPHENOFEMORAL JUNCTION SPONT: NORMAL
BH CV LOWER VASCULAR RIGHT COMMON FEMORAL AUGMENT: NORMAL
BH CV LOWER VASCULAR RIGHT COMMON FEMORAL COMPETENT: NORMAL
BH CV LOWER VASCULAR RIGHT COMMON FEMORAL COMPRESS: NORMAL
BH CV LOWER VASCULAR RIGHT COMMON FEMORAL PHASIC: NORMAL
BH CV LOWER VASCULAR RIGHT COMMON FEMORAL SPONT: NORMAL
BH CV LOWER VASCULAR RIGHT DISTAL FEMORAL COMPRESS: NORMAL
BH CV LOWER VASCULAR RIGHT GASTRONEMIUS COMPRESS: NORMAL
BH CV LOWER VASCULAR RIGHT GASTRONEMIUS THROMBUS: NORMAL
BH CV LOWER VASCULAR RIGHT GREATER SAPH AK COMPRESS: NORMAL
BH CV LOWER VASCULAR RIGHT GREATER SAPH BK COMPRESS: NORMAL
BH CV LOWER VASCULAR RIGHT LESSER SAPH COMPRESS: NORMAL
BH CV LOWER VASCULAR RIGHT MID FEMORAL AUGMENT: NORMAL
BH CV LOWER VASCULAR RIGHT MID FEMORAL COMPETENT: NORMAL
BH CV LOWER VASCULAR RIGHT MID FEMORAL COMPRESS: NORMAL
BH CV LOWER VASCULAR RIGHT MID FEMORAL PHASIC: NORMAL
BH CV LOWER VASCULAR RIGHT MID FEMORAL SPONT: NORMAL
BH CV LOWER VASCULAR RIGHT PERONEAL COMPRESS: NORMAL
BH CV LOWER VASCULAR RIGHT POPLITEAL AUGMENT: NORMAL
BH CV LOWER VASCULAR RIGHT POPLITEAL COMPETENT: NORMAL
BH CV LOWER VASCULAR RIGHT POPLITEAL COMPRESS: NORMAL
BH CV LOWER VASCULAR RIGHT POPLITEAL PHASIC: NORMAL
BH CV LOWER VASCULAR RIGHT POPLITEAL SPONT: NORMAL
BH CV LOWER VASCULAR RIGHT POSTERIOR TIBIAL COMPRESS: NORMAL
BH CV LOWER VASCULAR RIGHT PROXIMAL FEMORAL COMPRESS: NORMAL
BH CV LOWER VASCULAR RIGHT SAPHENOFEMORAL JUNCTION AUGMENT: NORMAL
BH CV LOWER VASCULAR RIGHT SAPHENOFEMORAL JUNCTION COMPRESS: NORMAL
BH CV LOWER VASCULAR RIGHT SAPHENOFEMORAL JUNCTION PHASIC: NORMAL
BH CV LOWER VASCULAR RIGHT SAPHENOFEMORAL JUNCTION SPONT: NORMAL
BILIRUB SERPL-MCNC: 0.2 MG/DL (ref 0.1–1.2)
BILIRUB UR QL STRIP: NEGATIVE
BUN BLD-MCNC: 19 MG/DL (ref 8–23)
BUN/CREAT SERPL: 9.2 (ref 7–25)
CALCIUM SPEC-SCNC: 8.6 MG/DL (ref 8.6–10.5)
CHLORIDE SERPL-SCNC: 99 MMOL/L (ref 98–107)
CHLORIDE UR-SCNC: 68 MMOL/L
CK SERPL-CCNC: 41 U/L (ref 20–180)
CLARITY UR: CLEAR
CO2 SERPL-SCNC: 26.9 MMOL/L (ref 22–29)
COLOR UR: YELLOW
CREAT BLD-MCNC: 2.06 MG/DL (ref 0.57–1)
CREAT UR-MCNC: 25.2 MG/DL
DEPRECATED RDW RBC AUTO: 47.4 FL (ref 37–54)
EOSINOPHIL # BLD AUTO: 1.32 10*3/MM3 (ref 0–0.7)
EOSINOPHIL NFR BLD AUTO: 13.1 % (ref 0.3–6.2)
EOSINOPHIL SPEC QL MICRO: 2 % EOS/100 CELLS (ref 0–0)
ERYTHROCYTE [DISTWIDTH] IN BLOOD BY AUTOMATED COUNT: 14 % (ref 11.7–13)
GFR SERPL CREATININE-BSD FRML MDRD: 23 ML/MIN/1.73
GLOBULIN UR ELPH-MCNC: 3.7 GM/DL
GLUCOSE BLD-MCNC: 97 MG/DL (ref 65–99)
GLUCOSE UR STRIP-MCNC: NEGATIVE MG/DL
HCT VFR BLD AUTO: 27.6 % (ref 35.6–45.5)
HGB BLD-MCNC: 8.5 G/DL (ref 11.9–15.5)
HGB UR QL STRIP.AUTO: ABNORMAL
HYALINE CASTS UR QL AUTO: NORMAL /LPF
IMM GRANULOCYTES # BLD: 0.02 10*3/MM3 (ref 0–0.03)
IMM GRANULOCYTES NFR BLD: 0.2 % (ref 0–0.5)
INR PPP: 1.24 (ref 0.9–1.1)
KETONES UR QL STRIP: NEGATIVE
LEUKOCYTE ESTERASE UR QL STRIP.AUTO: ABNORMAL
LYMPHOCYTES # BLD AUTO: 1.72 10*3/MM3 (ref 0.9–4.8)
LYMPHOCYTES NFR BLD AUTO: 17.1 % (ref 19.6–45.3)
MCH RBC QN AUTO: 28.8 PG (ref 26.9–32)
MCHC RBC AUTO-ENTMCNC: 30.8 G/DL (ref 32.4–36.3)
MCV RBC AUTO: 93.6 FL (ref 80.5–98.2)
MONOCYTES # BLD AUTO: 0.74 10*3/MM3 (ref 0.2–1.2)
MONOCYTES NFR BLD AUTO: 7.3 % (ref 5–12)
NEUTROPHILS # BLD AUTO: 6.18 10*3/MM3 (ref 1.9–8.1)
NEUTROPHILS NFR BLD AUTO: 61.3 % (ref 42.7–76)
NITRITE UR QL STRIP: NEGATIVE
NT-PROBNP SERPL-MCNC: 942.5 PG/ML (ref 0–1800)
PH UR STRIP.AUTO: 7.5 [PH] (ref 5–8)
PLATELET # BLD AUTO: 284 10*3/MM3 (ref 140–500)
PMV BLD AUTO: 9 FL (ref 6–12)
POTASSIUM BLD-SCNC: 4.1 MMOL/L (ref 3.5–5.2)
PROT SERPL-MCNC: 6.9 G/DL (ref 6–8.5)
PROT UR QL STRIP: NEGATIVE
PROT UR-MCNC: 8 MG/DL
PROTHROMBIN TIME: 15.4 SECONDS (ref 11.7–14.2)
RBC # BLD AUTO: 2.95 10*6/MM3 (ref 3.9–5.2)
RBC # UR: NORMAL /HPF
REF LAB TEST METHOD: NORMAL
SODIUM BLD-SCNC: 134 MMOL/L (ref 136–145)
SODIUM UR-SCNC: 91 MMOL/L
SP GR UR STRIP: <=1.005 (ref 1–1.03)
SQUAMOUS #/AREA URNS HPF: NORMAL /HPF
TROPONIN T SERPL-MCNC: <0.01 NG/ML (ref 0–0.03)
TSH SERPL DL<=0.05 MIU/L-ACNC: 3.05 MIU/ML (ref 0.27–4.2)
UROBILINOGEN UR QL STRIP: ABNORMAL
UUN 24H UR-MCNC: 124 MG/DL
VANCOMYCIN SERPL-MCNC: 26.1 MCG/ML (ref 5–40)
WBC NRBC COR # BLD: 10.08 10*3/MM3 (ref 4.5–10.7)
WBC UR QL AUTO: NORMAL /HPF

## 2018-02-23 PROCEDURE — 82550 ASSAY OF CK (CPK): CPT | Performed by: INTERNAL MEDICINE

## 2018-02-23 PROCEDURE — 70450 CT HEAD/BRAIN W/O DYE: CPT

## 2018-02-23 PROCEDURE — 0 TECHNETIUM ALBUMIN AGGREGATED: Performed by: INTERNAL MEDICINE

## 2018-02-23 PROCEDURE — 78582 LUNG VENTILAT&PERFUS IMAGING: CPT

## 2018-02-23 PROCEDURE — 80202 ASSAY OF VANCOMYCIN: CPT | Performed by: INTERNAL MEDICINE

## 2018-02-23 PROCEDURE — 81001 URINALYSIS AUTO W/SCOPE: CPT | Performed by: INTERNAL MEDICINE

## 2018-02-23 PROCEDURE — 80053 COMPREHEN METABOLIC PANEL: CPT | Performed by: HOSPITALIST

## 2018-02-23 PROCEDURE — 93005 ELECTROCARDIOGRAM TRACING: CPT | Performed by: INTERNAL MEDICINE

## 2018-02-23 PROCEDURE — 93010 ELECTROCARDIOGRAM REPORT: CPT | Performed by: INTERNAL MEDICINE

## 2018-02-23 PROCEDURE — 71045 X-RAY EXAM CHEST 1 VIEW: CPT

## 2018-02-23 PROCEDURE — 85610 PROTHROMBIN TIME: CPT | Performed by: HOSPITALIST

## 2018-02-23 PROCEDURE — 87205 SMEAR GRAM STAIN: CPT | Performed by: INTERNAL MEDICINE

## 2018-02-23 PROCEDURE — 85730 THROMBOPLASTIN TIME PARTIAL: CPT | Performed by: HOSPITALIST

## 2018-02-23 PROCEDURE — 99223 1ST HOSP IP/OBS HIGH 75: CPT | Performed by: INTERNAL MEDICINE

## 2018-02-23 PROCEDURE — 82436 ASSAY OF URINE CHLORIDE: CPT | Performed by: INTERNAL MEDICINE

## 2018-02-23 PROCEDURE — 84300 ASSAY OF URINE SODIUM: CPT | Performed by: INTERNAL MEDICINE

## 2018-02-23 PROCEDURE — 93970 EXTREMITY STUDY: CPT

## 2018-02-23 PROCEDURE — A9558 XE133 XENON 10MCI: HCPCS | Performed by: INTERNAL MEDICINE

## 2018-02-23 PROCEDURE — 83880 ASSAY OF NATRIURETIC PEPTIDE: CPT | Performed by: HOSPITALIST

## 2018-02-23 PROCEDURE — 84443 ASSAY THYROID STIM HORMONE: CPT | Performed by: INTERNAL MEDICINE

## 2018-02-23 PROCEDURE — 25010000002 HEPARIN (PORCINE) PER 1000 UNITS: Performed by: HOSPITALIST

## 2018-02-23 PROCEDURE — 85730 THROMBOPLASTIN TIME PARTIAL: CPT | Performed by: INTERNAL MEDICINE

## 2018-02-23 PROCEDURE — 0 XENON XE 133: Performed by: INTERNAL MEDICINE

## 2018-02-23 PROCEDURE — 84156 ASSAY OF PROTEIN URINE: CPT | Performed by: INTERNAL MEDICINE

## 2018-02-23 PROCEDURE — 84484 ASSAY OF TROPONIN QUANT: CPT | Performed by: HOSPITALIST

## 2018-02-23 PROCEDURE — 82570 ASSAY OF URINE CREATININE: CPT | Performed by: INTERNAL MEDICINE

## 2018-02-23 PROCEDURE — 76775 US EXAM ABDO BACK WALL LIM: CPT

## 2018-02-23 PROCEDURE — 85025 COMPLETE CBC W/AUTO DIFF WBC: CPT | Performed by: HOSPITALIST

## 2018-02-23 PROCEDURE — 84540 ASSAY OF URINE/UREA-N: CPT | Performed by: INTERNAL MEDICINE

## 2018-02-23 PROCEDURE — A9540 TC99M MAA: HCPCS | Performed by: INTERNAL MEDICINE

## 2018-02-23 PROCEDURE — 93005 ELECTROCARDIOGRAM TRACING: CPT | Performed by: HOSPITALIST

## 2018-02-23 RX ORDER — SODIUM CHLORIDE 0.9 % (FLUSH) 0.9 %
1-10 SYRINGE (ML) INJECTION AS NEEDED
Status: DISCONTINUED | OUTPATIENT
Start: 2018-02-23 | End: 2018-02-24

## 2018-02-23 RX ORDER — ONDANSETRON 4 MG/1
4 TABLET, FILM COATED ORAL EVERY 6 HOURS PRN
Status: DISCONTINUED | OUTPATIENT
Start: 2018-02-23 | End: 2018-02-27

## 2018-02-23 RX ORDER — SODIUM CHLORIDE 0.9 % (FLUSH) 0.9 %
1-10 SYRINGE (ML) INJECTION AS NEEDED
Status: DISCONTINUED | OUTPATIENT
Start: 2018-02-23 | End: 2018-02-28 | Stop reason: HOSPADM

## 2018-02-23 RX ORDER — ONDANSETRON 2 MG/ML
4 INJECTION INTRAMUSCULAR; INTRAVENOUS EVERY 6 HOURS PRN
Status: DISCONTINUED | OUTPATIENT
Start: 2018-02-23 | End: 2018-02-27

## 2018-02-23 RX ORDER — LEVOTHYROXINE SODIUM 88 UG/1
88 TABLET ORAL
Status: DISCONTINUED | OUTPATIENT
Start: 2018-02-23 | End: 2018-02-28 | Stop reason: HOSPADM

## 2018-02-23 RX ORDER — CETIRIZINE HYDROCHLORIDE 10 MG/1
10 TABLET ORAL DAILY
COMMUNITY

## 2018-02-23 RX ORDER — SODIUM CHLORIDE 9 MG/ML
75 INJECTION, SOLUTION INTRAVENOUS CONTINUOUS
Status: ACTIVE | OUTPATIENT
Start: 2018-02-23 | End: 2018-02-23

## 2018-02-23 RX ORDER — DOCUSATE SODIUM 100 MG/1
100 CAPSULE, LIQUID FILLED ORAL 2 TIMES DAILY
COMMUNITY

## 2018-02-23 RX ORDER — CALCIUM CARBONATE 200(500)MG
2 TABLET,CHEWABLE ORAL 2 TIMES DAILY PRN
Status: DISCONTINUED | OUTPATIENT
Start: 2018-02-23 | End: 2018-02-27

## 2018-02-23 RX ORDER — BACLOFEN 10 MG/1
10 TABLET ORAL 3 TIMES DAILY
COMMUNITY

## 2018-02-23 RX ORDER — CETIRIZINE HYDROCHLORIDE 10 MG/1
5 TABLET ORAL DAILY
Status: DISCONTINUED | OUTPATIENT
Start: 2018-02-23 | End: 2018-02-24

## 2018-02-23 RX ORDER — HYDROCODONE BITARTRATE AND ACETAMINOPHEN 7.5; 325 MG/1; MG/1
1 TABLET ORAL EVERY 6 HOURS PRN
Status: DISCONTINUED | OUTPATIENT
Start: 2018-02-23 | End: 2018-02-28

## 2018-02-23 RX ORDER — ONDANSETRON 4 MG/1
4 TABLET, ORALLY DISINTEGRATING ORAL EVERY 6 HOURS PRN
Status: DISCONTINUED | OUTPATIENT
Start: 2018-02-23 | End: 2018-02-27

## 2018-02-23 RX ORDER — BACLOFEN 10 MG/1
10 TABLET ORAL 3 TIMES DAILY
Status: DISCONTINUED | OUTPATIENT
Start: 2018-02-23 | End: 2018-02-28 | Stop reason: HOSPADM

## 2018-02-23 RX ORDER — NITROGLYCERIN 0.4 MG/1
0.4 TABLET SUBLINGUAL
Status: DISCONTINUED | OUTPATIENT
Start: 2018-02-23 | End: 2018-02-27

## 2018-02-23 RX ORDER — SENNA AND DOCUSATE SODIUM 50; 8.6 MG/1; MG/1
2 TABLET, FILM COATED ORAL NIGHTLY
Status: DISCONTINUED | OUTPATIENT
Start: 2018-02-23 | End: 2018-02-28 | Stop reason: HOSPADM

## 2018-02-23 RX ORDER — MONTELUKAST SODIUM 10 MG/1
10 TABLET ORAL NIGHTLY
COMMUNITY

## 2018-02-23 RX ORDER — SODIUM CHLORIDE 9 MG/ML
100 INJECTION, SOLUTION INTRAVENOUS CONTINUOUS
Status: DISCONTINUED | OUTPATIENT
Start: 2018-02-23 | End: 2018-02-26

## 2018-02-23 RX ORDER — AMLODIPINE BESYLATE 5 MG/1
5 TABLET ORAL 2 TIMES DAILY
Status: DISCONTINUED | OUTPATIENT
Start: 2018-02-23 | End: 2018-02-26

## 2018-02-23 RX ORDER — AMLODIPINE BESYLATE 5 MG/1
5 TABLET ORAL 2 TIMES DAILY
COMMUNITY
End: 2018-02-28 | Stop reason: HOSPADM

## 2018-02-23 RX ORDER — CARVEDILOL 6.25 MG/1
6.25 TABLET ORAL 2 TIMES DAILY WITH MEALS
Status: DISCONTINUED | OUTPATIENT
Start: 2018-02-23 | End: 2018-02-25

## 2018-02-23 RX ORDER — HYDROCODONE BITARTRATE AND ACETAMINOPHEN 5; 325 MG/1; MG/1
1 TABLET ORAL EVERY 4 HOURS PRN
Status: DISCONTINUED | OUTPATIENT
Start: 2018-02-23 | End: 2018-02-28 | Stop reason: HOSPADM

## 2018-02-23 RX ORDER — ALBUTEROL SULFATE 2.5 MG/3ML
2.5 SOLUTION RESPIRATORY (INHALATION) EVERY 6 HOURS PRN
Status: DISCONTINUED | OUTPATIENT
Start: 2018-02-23 | End: 2018-02-27

## 2018-02-23 RX ORDER — VANCOMYCIN HCL-SODIUM CHLORIDE IV SOLN 1.25 GM/250ML-0.9% 1.25-0.9/25 GM/ML-%
1250 SOLUTION INTRAVENOUS EVERY 12 HOURS SCHEDULED
COMMUNITY
End: 2018-02-28 | Stop reason: HOSPADM

## 2018-02-23 RX ORDER — SODIUM CHLORIDE 9 MG/ML
75 INJECTION, SOLUTION INTRAVENOUS CONTINUOUS
Status: DISCONTINUED | OUTPATIENT
Start: 2018-02-23 | End: 2018-02-23

## 2018-02-23 RX ORDER — TOLTERODINE 4 MG/1
4 CAPSULE, EXTENDED RELEASE ORAL DAILY
COMMUNITY

## 2018-02-23 RX ORDER — MONTELUKAST SODIUM 10 MG/1
10 TABLET ORAL NIGHTLY
Status: DISCONTINUED | OUTPATIENT
Start: 2018-02-23 | End: 2018-02-28 | Stop reason: HOSPADM

## 2018-02-23 RX ORDER — ONDANSETRON 2 MG/ML
4 INJECTION INTRAMUSCULAR; INTRAVENOUS EVERY 6 HOURS PRN
Status: DISCONTINUED | OUTPATIENT
Start: 2018-02-23 | End: 2018-02-28

## 2018-02-23 RX ORDER — FAMOTIDINE 20 MG/1
20 TABLET, FILM COATED ORAL NIGHTLY
Status: DISCONTINUED | OUTPATIENT
Start: 2018-02-23 | End: 2018-02-24

## 2018-02-23 RX ORDER — ACETAMINOPHEN 325 MG/1
650 TABLET ORAL EVERY 4 HOURS PRN
Status: DISCONTINUED | OUTPATIENT
Start: 2018-02-23 | End: 2018-02-28

## 2018-02-23 RX ORDER — AZELASTINE 1 MG/ML
1 SPRAY, METERED NASAL 2 TIMES DAILY
COMMUNITY

## 2018-02-23 RX ORDER — SERTRALINE HYDROCHLORIDE 100 MG/1
100 TABLET, FILM COATED ORAL EVERY EVENING
Status: DISCONTINUED | OUTPATIENT
Start: 2018-02-23 | End: 2018-02-28 | Stop reason: HOSPADM

## 2018-02-23 RX ORDER — HYDROCODONE BITARTRATE AND ACETAMINOPHEN 7.5; 325 MG/1; MG/1
1 TABLET ORAL EVERY 6 HOURS PRN
COMMUNITY
End: 2018-02-28 | Stop reason: HOSPADM

## 2018-02-23 RX ORDER — OXYBUTYNIN CHLORIDE 10 MG/1
10 TABLET, EXTENDED RELEASE ORAL DAILY
Status: DISCONTINUED | OUTPATIENT
Start: 2018-02-23 | End: 2018-02-28 | Stop reason: HOSPADM

## 2018-02-23 RX ORDER — ZOLPIDEM TARTRATE 5 MG/1
5 TABLET ORAL NIGHTLY PRN
Status: DISCONTINUED | OUTPATIENT
Start: 2018-02-23 | End: 2018-02-28

## 2018-02-23 RX ORDER — CLONIDINE HYDROCHLORIDE 0.1 MG/1
0.1 TABLET ORAL EVERY 12 HOURS PRN
Status: DISCONTINUED | OUTPATIENT
Start: 2018-02-23 | End: 2018-02-28

## 2018-02-23 RX ORDER — AZELASTINE 1 MG/ML
1 SPRAY, METERED NASAL DAILY
Status: DISCONTINUED | OUTPATIENT
Start: 2018-02-23 | End: 2018-02-28 | Stop reason: HOSPADM

## 2018-02-23 RX ORDER — HYDROCODONE BITARTRATE AND ACETAMINOPHEN 5; 325 MG/1; MG/1
1 TABLET ORAL EVERY 4 HOURS PRN
Status: DISCONTINUED | OUTPATIENT
Start: 2018-02-23 | End: 2018-02-24

## 2018-02-23 RX ORDER — ACETAMINOPHEN 325 MG/1
650 TABLET ORAL EVERY 4 HOURS PRN
Status: DISCONTINUED | OUTPATIENT
Start: 2018-02-23 | End: 2018-02-27

## 2018-02-23 RX ORDER — PANTOPRAZOLE SODIUM 40 MG/1
40 TABLET, DELAYED RELEASE ORAL
Status: DISCONTINUED | OUTPATIENT
Start: 2018-02-23 | End: 2018-02-28 | Stop reason: HOSPADM

## 2018-02-23 RX ADMIN — PANTOPRAZOLE SODIUM 40 MG: 40 TABLET, DELAYED RELEASE ORAL at 18:30

## 2018-02-23 RX ADMIN — SODIUM CHLORIDE 100 ML/HR: 9 INJECTION, SOLUTION INTRAVENOUS at 21:24

## 2018-02-23 RX ADMIN — CARVEDILOL 6.25 MG: 6.25 TABLET, FILM COATED ORAL at 18:30

## 2018-02-23 RX ADMIN — FAMOTIDINE 20 MG: 20 TABLET, FILM COATED ORAL at 21:24

## 2018-02-23 RX ADMIN — LEVOTHYROXINE SODIUM 88 MCG: 88 TABLET ORAL at 16:13

## 2018-02-23 RX ADMIN — HEPARIN SODIUM 15.1 UNITS/KG/HR: 10000 INJECTION, SOLUTION INTRAVENOUS at 03:23

## 2018-02-23 RX ADMIN — Medication: at 16:19

## 2018-02-23 RX ADMIN — AZELASTINE HYDROCHLORIDE 1 SPRAY: 137 SPRAY, METERED NASAL at 16:14

## 2018-02-23 RX ADMIN — MONTELUKAST 10 MG: 10 TABLET, FILM COATED ORAL at 21:24

## 2018-02-23 RX ADMIN — DOCUSATE SODIUM -SENNOSIDES 2 TABLET: 50; 8.6 TABLET, COATED ORAL at 21:24

## 2018-02-23 RX ADMIN — HEPARIN SODIUM 19.1 UNITS/KG/HR: 10000 INJECTION, SOLUTION INTRAVENOUS at 18:33

## 2018-02-23 RX ADMIN — AMLODIPINE BESYLATE 5 MG: 5 TABLET ORAL at 21:24

## 2018-02-23 RX ADMIN — XENON XE-133 9 MILLICURIE: 10 GAS RESPIRATORY (INHALATION) at 07:55

## 2018-02-23 RX ADMIN — HYDROCODONE BITARTRATE AND ACETAMINOPHEN 1 TABLET: 5; 325 TABLET ORAL at 22:13

## 2018-02-23 RX ADMIN — Medication 1 DOSE: at 07:55

## 2018-02-23 RX ADMIN — SERTRALINE 100 MG: 100 TABLET, FILM COATED ORAL at 16:13

## 2018-02-23 RX ADMIN — OXYBUTYNIN CHLORIDE 10 MG: 10 TABLET, FILM COATED, EXTENDED RELEASE ORAL at 16:14

## 2018-02-23 RX ADMIN — HYDROCODONE BITARTRATE AND ACETAMINOPHEN 1 TABLET: 7.5; 325 TABLET ORAL at 12:30

## 2018-02-23 RX ADMIN — BACLOFEN 10 MG: 10 TABLET ORAL at 21:24

## 2018-02-23 RX ADMIN — CETIRIZINE HYDROCHLORIDE 5 MG: 10 TABLET, FILM COATED ORAL at 16:13

## 2018-02-23 RX ADMIN — BACLOFEN 10 MG: 10 TABLET ORAL at 16:13

## 2018-02-23 RX ADMIN — AMLODIPINE BESYLATE 5 MG: 5 TABLET ORAL at 16:13

## 2018-02-24 LAB
ALBUMIN SERPL-MCNC: 2.9 G/DL (ref 3.5–5.2)
ANION GAP SERPL CALCULATED.3IONS-SCNC: 11.9 MMOL/L
APTT PPP: 103.5 SECONDS (ref 22.7–35.4)
APTT PPP: 52.3 SECONDS (ref 22.7–35.4)
APTT PPP: 95.8 SECONDS (ref 22.7–35.4)
BASOPHILS # BLD AUTO: 0.11 10*3/MM3 (ref 0–0.2)
BASOPHILS NFR BLD AUTO: 1.1 % (ref 0–1.5)
BUN BLD-MCNC: 22 MG/DL (ref 8–23)
BUN/CREAT SERPL: 12 (ref 7–25)
CALCIUM SPEC-SCNC: 8.4 MG/DL (ref 8.6–10.5)
CHLORIDE SERPL-SCNC: 99 MMOL/L (ref 98–107)
CO2 SERPL-SCNC: 27.1 MMOL/L (ref 22–29)
CREAT BLD-MCNC: 1.84 MG/DL (ref 0.57–1)
DEPRECATED RDW RBC AUTO: 47.6 FL (ref 37–54)
EOSINOPHIL # BLD AUTO: 1.26 10*3/MM3 (ref 0–0.7)
EOSINOPHIL NFR BLD AUTO: 12.9 % (ref 0.3–6.2)
ERYTHROCYTE [DISTWIDTH] IN BLOOD BY AUTOMATED COUNT: 14 % (ref 11.7–13)
GFR SERPL CREATININE-BSD FRML MDRD: 26 ML/MIN/1.73
GLUCOSE BLD-MCNC: 96 MG/DL (ref 65–99)
HCT VFR BLD AUTO: 26.4 % (ref 35.6–45.5)
HGB BLD-MCNC: 8.2 G/DL (ref 11.9–15.5)
IMM GRANULOCYTES # BLD: 0.03 10*3/MM3 (ref 0–0.03)
IMM GRANULOCYTES NFR BLD: 0.3 % (ref 0–0.5)
LYMPHOCYTES # BLD AUTO: 2.14 10*3/MM3 (ref 0.9–4.8)
LYMPHOCYTES NFR BLD AUTO: 21.9 % (ref 19.6–45.3)
MCH RBC QN AUTO: 29 PG (ref 26.9–32)
MCHC RBC AUTO-ENTMCNC: 31.1 G/DL (ref 32.4–36.3)
MCV RBC AUTO: 93.3 FL (ref 80.5–98.2)
MONOCYTES # BLD AUTO: 0.78 10*3/MM3 (ref 0.2–1.2)
MONOCYTES NFR BLD AUTO: 8 % (ref 5–12)
NEUTROPHILS # BLD AUTO: 5.43 10*3/MM3 (ref 1.9–8.1)
NEUTROPHILS NFR BLD AUTO: 55.8 % (ref 42.7–76)
PHOSPHATE SERPL-MCNC: 4.7 MG/DL (ref 2.5–4.5)
PLATELET # BLD AUTO: 320 10*3/MM3 (ref 140–500)
PMV BLD AUTO: 9.1 FL (ref 6–12)
POTASSIUM BLD-SCNC: 4.2 MMOL/L (ref 3.5–5.2)
RBC # BLD AUTO: 2.83 10*6/MM3 (ref 3.9–5.2)
SODIUM BLD-SCNC: 138 MMOL/L (ref 136–145)
URATE SERPL-MCNC: 5.3 MG/DL (ref 2.4–5.7)
VANCOMYCIN SERPL-MCNC: 21.4 MCG/ML (ref 5–40)
WBC NRBC COR # BLD: 9.75 10*3/MM3 (ref 4.5–10.7)

## 2018-02-24 PROCEDURE — 25010000002 CEFTRIAXONE PER 250 MG: Performed by: INTERNAL MEDICINE

## 2018-02-24 PROCEDURE — 84550 ASSAY OF BLOOD/URIC ACID: CPT | Performed by: INTERNAL MEDICINE

## 2018-02-24 PROCEDURE — 80069 RENAL FUNCTION PANEL: CPT | Performed by: INTERNAL MEDICINE

## 2018-02-24 PROCEDURE — 85730 THROMBOPLASTIN TIME PARTIAL: CPT | Performed by: HOSPITALIST

## 2018-02-24 PROCEDURE — 85730 THROMBOPLASTIN TIME PARTIAL: CPT | Performed by: INTERNAL MEDICINE

## 2018-02-24 PROCEDURE — 97162 PT EVAL MOD COMPLEX 30 MIN: CPT

## 2018-02-24 PROCEDURE — 25010000002 VANCOMYCIN PER 500 MG: Performed by: INTERNAL MEDICINE

## 2018-02-24 PROCEDURE — 85025 COMPLETE CBC W/AUTO DIFF WBC: CPT | Performed by: INTERNAL MEDICINE

## 2018-02-24 PROCEDURE — 25010000002 HEPARIN (PORCINE) PER 1000 UNITS: Performed by: HOSPITALIST

## 2018-02-24 PROCEDURE — 80202 ASSAY OF VANCOMYCIN: CPT | Performed by: INTERNAL MEDICINE

## 2018-02-24 PROCEDURE — 97110 THERAPEUTIC EXERCISES: CPT

## 2018-02-24 RX ORDER — IPRATROPIUM BROMIDE AND ALBUTEROL SULFATE 2.5; .5 MG/3ML; MG/3ML
3 SOLUTION RESPIRATORY (INHALATION) EVERY 6 HOURS PRN
Status: DISCONTINUED | OUTPATIENT
Start: 2018-02-24 | End: 2018-02-27

## 2018-02-24 RX ORDER — PANTOPRAZOLE SODIUM 40 MG/1
40 TABLET, DELAYED RELEASE ORAL
Status: DISCONTINUED | OUTPATIENT
Start: 2018-02-25 | End: 2018-02-24

## 2018-02-24 RX ORDER — VANCOMYCIN HYDROCHLORIDE 1 G/200ML
1000 INJECTION, SOLUTION INTRAVENOUS ONCE
Status: COMPLETED | OUTPATIENT
Start: 2018-02-24 | End: 2018-02-24

## 2018-02-24 RX ORDER — IPRATROPIUM BROMIDE AND ALBUTEROL SULFATE 2.5; .5 MG/3ML; MG/3ML
3 SOLUTION RESPIRATORY (INHALATION)
Status: DISCONTINUED | OUTPATIENT
Start: 2018-02-24 | End: 2018-02-24

## 2018-02-24 RX ADMIN — LEVOTHYROXINE SODIUM 88 MCG: 88 TABLET ORAL at 05:10

## 2018-02-24 RX ADMIN — MONTELUKAST 10 MG: 10 TABLET, FILM COATED ORAL at 20:50

## 2018-02-24 RX ADMIN — DOCUSATE SODIUM -SENNOSIDES 2 TABLET: 50; 8.6 TABLET, COATED ORAL at 20:50

## 2018-02-24 RX ADMIN — PANTOPRAZOLE SODIUM 40 MG: 40 TABLET, DELAYED RELEASE ORAL at 05:10

## 2018-02-24 RX ADMIN — ACETAMINOPHEN 650 MG: 325 TABLET, FILM COATED ORAL at 05:10

## 2018-02-24 RX ADMIN — AMLODIPINE BESYLATE 5 MG: 5 TABLET ORAL at 08:16

## 2018-02-24 RX ADMIN — AMLODIPINE BESYLATE 5 MG: 5 TABLET ORAL at 20:50

## 2018-02-24 RX ADMIN — OXYBUTYNIN CHLORIDE 10 MG: 10 TABLET, FILM COATED, EXTENDED RELEASE ORAL at 08:22

## 2018-02-24 RX ADMIN — CEFTRIAXONE 2 G: 2 INJECTION, POWDER, FOR SOLUTION INTRAMUSCULAR; INTRAVENOUS at 15:19

## 2018-02-24 RX ADMIN — VANCOMYCIN HYDROCHLORIDE 1000 MG: 1 INJECTION, SOLUTION INTRAVENOUS at 18:31

## 2018-02-24 RX ADMIN — AZELASTINE HYDROCHLORIDE 1 SPRAY: 137 SPRAY, METERED NASAL at 08:08

## 2018-02-24 RX ADMIN — PANTOPRAZOLE SODIUM 40 MG: 40 TABLET, DELAYED RELEASE ORAL at 08:16

## 2018-02-24 RX ADMIN — HYDROCODONE BITARTRATE AND ACETAMINOPHEN 1 TABLET: 7.5; 325 TABLET ORAL at 20:50

## 2018-02-24 RX ADMIN — CARVEDILOL 6.25 MG: 6.25 TABLET, FILM COATED ORAL at 17:20

## 2018-02-24 RX ADMIN — BACLOFEN 10 MG: 10 TABLET ORAL at 15:33

## 2018-02-24 RX ADMIN — CARVEDILOL 6.25 MG: 6.25 TABLET, FILM COATED ORAL at 08:16

## 2018-02-24 RX ADMIN — SODIUM CHLORIDE 100 ML/HR: 9 INJECTION, SOLUTION INTRAVENOUS at 08:24

## 2018-02-24 RX ADMIN — SERTRALINE 100 MG: 100 TABLET, FILM COATED ORAL at 16:53

## 2018-02-24 RX ADMIN — HEPARIN SODIUM 15.1 UNITS/KG/HR: 10000 INJECTION, SOLUTION INTRAVENOUS at 10:23

## 2018-02-24 RX ADMIN — BACLOFEN 10 MG: 10 TABLET ORAL at 20:50

## 2018-02-24 RX ADMIN — PANTOPRAZOLE SODIUM 40 MG: 40 TABLET, DELAYED RELEASE ORAL at 16:53

## 2018-02-24 RX ADMIN — HYDROCODONE BITARTRATE AND ACETAMINOPHEN 1 TABLET: 5; 325 TABLET ORAL at 15:33

## 2018-02-24 RX ADMIN — BACLOFEN 10 MG: 10 TABLET ORAL at 08:16

## 2018-02-24 RX ADMIN — HYDROCODONE BITARTRATE AND ACETAMINOPHEN 1 TABLET: 5; 325 TABLET ORAL at 08:16

## 2018-02-25 LAB
ALBUMIN SERPL-MCNC: 3.2 G/DL (ref 3.5–5.2)
ALBUMIN/GLOB SERPL: 0.9 G/DL
ALP SERPL-CCNC: 87 U/L (ref 39–117)
ALT SERPL W P-5'-P-CCNC: 10 U/L (ref 1–33)
ANION GAP SERPL CALCULATED.3IONS-SCNC: 11.2 MMOL/L
APTT PPP: 131.6 SECONDS (ref 22.7–35.4)
APTT PPP: 79.6 SECONDS (ref 22.7–35.4)
APTT PPP: 80.3 SECONDS (ref 22.7–35.4)
AST SERPL-CCNC: 16 U/L (ref 1–32)
BASOPHILS # BLD AUTO: 0.11 10*3/MM3 (ref 0–0.2)
BASOPHILS NFR BLD AUTO: 1.2 % (ref 0–1.5)
BILIRUB SERPL-MCNC: <0.2 MG/DL (ref 0.1–1.2)
BUN BLD-MCNC: 23 MG/DL (ref 8–23)
BUN/CREAT SERPL: 11.8 (ref 7–25)
CALCIUM SPEC-SCNC: 8.3 MG/DL (ref 8.6–10.5)
CHLORIDE SERPL-SCNC: 98 MMOL/L (ref 98–107)
CHOLEST SERPL-MCNC: 143 MG/DL (ref 0–200)
CO2 SERPL-SCNC: 24.8 MMOL/L (ref 22–29)
CREAT BLD-MCNC: 1.95 MG/DL (ref 0.57–1)
DEPRECATED RDW RBC AUTO: 47.8 FL (ref 37–54)
EOSINOPHIL # BLD AUTO: 1.17 10*3/MM3 (ref 0–0.7)
EOSINOPHIL NFR BLD AUTO: 12.5 % (ref 0.3–6.2)
ERYTHROCYTE [DISTWIDTH] IN BLOOD BY AUTOMATED COUNT: 13.9 % (ref 11.7–13)
GFR SERPL CREATININE-BSD FRML MDRD: 25 ML/MIN/1.73
GLOBULIN UR ELPH-MCNC: 3.6 GM/DL
GLUCOSE BLD-MCNC: 137 MG/DL (ref 65–99)
HBA1C MFR BLD: 5.05 % (ref 4.8–5.6)
HCT VFR BLD AUTO: 27.6 % (ref 35.6–45.5)
HDLC SERPL-MCNC: 53 MG/DL (ref 40–60)
HGB BLD-MCNC: 8.4 G/DL (ref 11.9–15.5)
IMM GRANULOCYTES # BLD: 0.03 10*3/MM3 (ref 0–0.03)
IMM GRANULOCYTES NFR BLD: 0.3 % (ref 0–0.5)
LDLC SERPL CALC-MCNC: 72 MG/DL (ref 0–100)
LDLC/HDLC SERPL: 1.37 {RATIO}
LYMPHOCYTES # BLD AUTO: 1.78 10*3/MM3 (ref 0.9–4.8)
LYMPHOCYTES NFR BLD AUTO: 19 % (ref 19.6–45.3)
MCH RBC QN AUTO: 28.7 PG (ref 26.9–32)
MCHC RBC AUTO-ENTMCNC: 30.4 G/DL (ref 32.4–36.3)
MCV RBC AUTO: 94.2 FL (ref 80.5–98.2)
MONOCYTES # BLD AUTO: 0.89 10*3/MM3 (ref 0.2–1.2)
MONOCYTES NFR BLD AUTO: 9.5 % (ref 5–12)
NEUTROPHILS # BLD AUTO: 5.38 10*3/MM3 (ref 1.9–8.1)
NEUTROPHILS NFR BLD AUTO: 57.5 % (ref 42.7–76)
NT-PROBNP SERPL-MCNC: 719.8 PG/ML (ref 0–1800)
PLATELET # BLD AUTO: 303 10*3/MM3 (ref 140–500)
PMV BLD AUTO: 9.4 FL (ref 6–12)
POTASSIUM BLD-SCNC: 4.1 MMOL/L (ref 3.5–5.2)
PROT SERPL-MCNC: 6.8 G/DL (ref 6–8.5)
RBC # BLD AUTO: 2.93 10*6/MM3 (ref 3.9–5.2)
SODIUM BLD-SCNC: 134 MMOL/L (ref 136–145)
TRIGL SERPL-MCNC: 88 MG/DL (ref 0–150)
TSH SERPL DL<=0.05 MIU/L-ACNC: 2.74 MIU/ML (ref 0.27–4.2)
VANCOMYCIN SERPL-MCNC: 27.2 MCG/ML (ref 5–40)
VLDLC SERPL-MCNC: 17.6 MG/DL (ref 5–40)
WBC NRBC COR # BLD: 9.36 10*3/MM3 (ref 4.5–10.7)

## 2018-02-25 PROCEDURE — 80053 COMPREHEN METABOLIC PANEL: CPT | Performed by: HOSPITALIST

## 2018-02-25 PROCEDURE — 93010 ELECTROCARDIOGRAM REPORT: CPT | Performed by: INTERNAL MEDICINE

## 2018-02-25 PROCEDURE — 85025 COMPLETE CBC W/AUTO DIFF WBC: CPT | Performed by: INTERNAL MEDICINE

## 2018-02-25 PROCEDURE — 93005 ELECTROCARDIOGRAM TRACING: CPT | Performed by: HOSPITALIST

## 2018-02-25 PROCEDURE — 25010000002 HEPARIN (PORCINE) PER 1000 UNITS: Performed by: HOSPITALIST

## 2018-02-25 PROCEDURE — 84443 ASSAY THYROID STIM HORMONE: CPT | Performed by: HOSPITALIST

## 2018-02-25 PROCEDURE — 80061 LIPID PANEL: CPT | Performed by: HOSPITALIST

## 2018-02-25 PROCEDURE — 99223 1ST HOSP IP/OBS HIGH 75: CPT | Performed by: INTERNAL MEDICINE

## 2018-02-25 PROCEDURE — 85730 THROMBOPLASTIN TIME PARTIAL: CPT | Performed by: HOSPITALIST

## 2018-02-25 PROCEDURE — 83880 ASSAY OF NATRIURETIC PEPTIDE: CPT | Performed by: HOSPITALIST

## 2018-02-25 PROCEDURE — 83036 HEMOGLOBIN GLYCOSYLATED A1C: CPT | Performed by: HOSPITALIST

## 2018-02-25 PROCEDURE — 80202 ASSAY OF VANCOMYCIN: CPT | Performed by: INTERNAL MEDICINE

## 2018-02-25 PROCEDURE — 25010000002 CEFTRIAXONE PER 250 MG: Performed by: INTERNAL MEDICINE

## 2018-02-25 RX ORDER — OXYBUTYNIN CHLORIDE 5 MG/1
5 TABLET, EXTENDED RELEASE ORAL DAILY
Status: DISCONTINUED | OUTPATIENT
Start: 2018-02-25 | End: 2018-02-25

## 2018-02-25 RX ORDER — CARVEDILOL 3.12 MG/1
3.12 TABLET ORAL 2 TIMES DAILY WITH MEALS
Status: DISCONTINUED | OUTPATIENT
Start: 2018-02-25 | End: 2018-02-28 | Stop reason: HOSPADM

## 2018-02-25 RX ADMIN — BACLOFEN 10 MG: 10 TABLET ORAL at 20:50

## 2018-02-25 RX ADMIN — CARVEDILOL 6.25 MG: 6.25 TABLET, FILM COATED ORAL at 08:32

## 2018-02-25 RX ADMIN — ACETAMINOPHEN 650 MG: 325 TABLET, FILM COATED ORAL at 06:01

## 2018-02-25 RX ADMIN — PANTOPRAZOLE SODIUM 40 MG: 40 TABLET, DELAYED RELEASE ORAL at 06:01

## 2018-02-25 RX ADMIN — AMLODIPINE BESYLATE 5 MG: 5 TABLET ORAL at 08:32

## 2018-02-25 RX ADMIN — ACETAMINOPHEN 650 MG: 325 TABLET, FILM COATED ORAL at 15:28

## 2018-02-25 RX ADMIN — CEFTRIAXONE 2 G: 2 INJECTION, POWDER, FOR SOLUTION INTRAMUSCULAR; INTRAVENOUS at 15:28

## 2018-02-25 RX ADMIN — AMLODIPINE BESYLATE 5 MG: 5 TABLET ORAL at 20:50

## 2018-02-25 RX ADMIN — HYDROCODONE BITARTRATE AND ACETAMINOPHEN 1 TABLET: 5; 325 TABLET ORAL at 01:39

## 2018-02-25 RX ADMIN — SERTRALINE 100 MG: 100 TABLET, FILM COATED ORAL at 19:10

## 2018-02-25 RX ADMIN — BACLOFEN 10 MG: 10 TABLET ORAL at 15:28

## 2018-02-25 RX ADMIN — SODIUM CHLORIDE 100 ML/HR: 9 INJECTION, SOLUTION INTRAVENOUS at 17:00

## 2018-02-25 RX ADMIN — BACLOFEN 10 MG: 10 TABLET ORAL at 08:32

## 2018-02-25 RX ADMIN — HEPARIN SODIUM 14.1 UNITS/KG/HR: 10000 INJECTION, SOLUTION INTRAVENOUS at 20:54

## 2018-02-25 RX ADMIN — OXYBUTYNIN CHLORIDE 10 MG: 10 TABLET, FILM COATED, EXTENDED RELEASE ORAL at 08:32

## 2018-02-25 RX ADMIN — DOCUSATE SODIUM -SENNOSIDES 2 TABLET: 50; 8.6 TABLET, COATED ORAL at 20:50

## 2018-02-25 RX ADMIN — AZELASTINE HYDROCHLORIDE 1 SPRAY: 137 SPRAY, METERED NASAL at 08:32

## 2018-02-25 RX ADMIN — HYDROCODONE BITARTRATE AND ACETAMINOPHEN 1 TABLET: 5; 325 TABLET ORAL at 22:06

## 2018-02-25 RX ADMIN — CARVEDILOL 3.12 MG: 3.12 TABLET, FILM COATED ORAL at 19:10

## 2018-02-25 RX ADMIN — PANTOPRAZOLE SODIUM 40 MG: 40 TABLET, DELAYED RELEASE ORAL at 19:10

## 2018-02-25 RX ADMIN — SODIUM CHLORIDE 100 ML/HR: 9 INJECTION, SOLUTION INTRAVENOUS at 06:01

## 2018-02-25 RX ADMIN — LEVOTHYROXINE SODIUM 88 MCG: 88 TABLET ORAL at 06:00

## 2018-02-25 RX ADMIN — MONTELUKAST 10 MG: 10 TABLET, FILM COATED ORAL at 20:50

## 2018-02-25 RX ADMIN — HEPARIN SODIUM 17.1 UNITS/KG/HR: 10000 INJECTION, SOLUTION INTRAVENOUS at 01:50

## 2018-02-26 LAB
ABO GROUP BLD: NORMAL
ANION GAP SERPL CALCULATED.3IONS-SCNC: 9 MMOL/L
ANTI-E: NORMAL
APTT PPP: 91.8 SECONDS (ref 22.7–35.4)
BASOPHILS # BLD AUTO: 0.14 10*3/MM3 (ref 0–0.2)
BASOPHILS NFR BLD AUTO: 1.6 % (ref 0–1.5)
BLD GP AB SCN SERPL QL: POSITIVE
BUN BLD-MCNC: 21 MG/DL (ref 8–23)
BUN/CREAT SERPL: 13.3 (ref 7–25)
CALCIUM SPEC-SCNC: 8.6 MG/DL (ref 8.6–10.5)
CHLORIDE SERPL-SCNC: 102 MMOL/L (ref 98–107)
CO2 SERPL-SCNC: 26 MMOL/L (ref 22–29)
CREAT BLD-MCNC: 1.58 MG/DL (ref 0.57–1)
DEPRECATED RDW RBC AUTO: 47.8 FL (ref 37–54)
EOSINOPHIL # BLD AUTO: 1.08 10*3/MM3 (ref 0–0.7)
EOSINOPHIL NFR BLD AUTO: 12.3 % (ref 0.3–6.2)
ERYTHROCYTE [DISTWIDTH] IN BLOOD BY AUTOMATED COUNT: 13.9 % (ref 11.7–13)
GFR SERPL CREATININE-BSD FRML MDRD: 31 ML/MIN/1.73
GLUCOSE BLD-MCNC: 98 MG/DL (ref 65–99)
HCT VFR BLD AUTO: 26 % (ref 35.6–45.5)
HGB BLD-MCNC: 7.9 G/DL (ref 11.9–15.5)
IMM GRANULOCYTES # BLD: 0.03 10*3/MM3 (ref 0–0.03)
IMM GRANULOCYTES NFR BLD: 0.3 % (ref 0–0.5)
INR PPP: 1.18 (ref 0.9–1.1)
LYMPHOCYTES # BLD AUTO: 1.76 10*3/MM3 (ref 0.9–4.8)
LYMPHOCYTES NFR BLD AUTO: 20.1 % (ref 19.6–45.3)
MCH RBC QN AUTO: 28.3 PG (ref 26.9–32)
MCHC RBC AUTO-ENTMCNC: 30.4 G/DL (ref 32.4–36.3)
MCV RBC AUTO: 93.2 FL (ref 80.5–98.2)
MONOCYTES # BLD AUTO: 0.7 10*3/MM3 (ref 0.2–1.2)
MONOCYTES NFR BLD AUTO: 8 % (ref 5–12)
NEUTROPHILS # BLD AUTO: 5.06 10*3/MM3 (ref 1.9–8.1)
NEUTROPHILS NFR BLD AUTO: 57.7 % (ref 42.7–76)
PLATELET # BLD AUTO: 326 10*3/MM3 (ref 140–500)
PMV BLD AUTO: 9.4 FL (ref 6–12)
POTASSIUM BLD-SCNC: 4 MMOL/L (ref 3.5–5.2)
PROTHROMBIN TIME: 14.8 SECONDS (ref 11.7–14.2)
RBC # BLD AUTO: 2.79 10*6/MM3 (ref 3.9–5.2)
RH BLD: POSITIVE
SODIUM BLD-SCNC: 137 MMOL/L (ref 136–145)
VANCOMYCIN SERPL-MCNC: 19.5 MCG/ML (ref 5–40)
WBC NRBC COR # BLD: 8.77 10*3/MM3 (ref 4.5–10.7)

## 2018-02-26 PROCEDURE — 80048 BASIC METABOLIC PNL TOTAL CA: CPT | Performed by: INTERNAL MEDICINE

## 2018-02-26 PROCEDURE — 86901 BLOOD TYPING SEROLOGIC RH(D): CPT | Performed by: HOSPITALIST

## 2018-02-26 PROCEDURE — 86850 RBC ANTIBODY SCREEN: CPT | Performed by: HOSPITALIST

## 2018-02-26 PROCEDURE — 86870 RBC ANTIBODY IDENTIFICATION: CPT | Performed by: HOSPITALIST

## 2018-02-26 PROCEDURE — 85025 COMPLETE CBC W/AUTO DIFF WBC: CPT | Performed by: INTERNAL MEDICINE

## 2018-02-26 PROCEDURE — 86920 COMPATIBILITY TEST SPIN: CPT

## 2018-02-26 PROCEDURE — 86922 COMPATIBILITY TEST ANTIGLOB: CPT

## 2018-02-26 PROCEDURE — 99233 SBSQ HOSP IP/OBS HIGH 50: CPT | Performed by: INTERNAL MEDICINE

## 2018-02-26 PROCEDURE — 85610 PROTHROMBIN TIME: CPT | Performed by: HOSPITALIST

## 2018-02-26 PROCEDURE — 25010000002 VANCOMYCIN: Performed by: INTERNAL MEDICINE

## 2018-02-26 PROCEDURE — 97110 THERAPEUTIC EXERCISES: CPT

## 2018-02-26 PROCEDURE — 86902 BLOOD TYPE ANTIGEN DONOR EA: CPT

## 2018-02-26 PROCEDURE — 80202 ASSAY OF VANCOMYCIN: CPT | Performed by: INTERNAL MEDICINE

## 2018-02-26 PROCEDURE — 25010000002 CEFTRIAXONE IN SWFI 2 GRAMS/20ML IV PUSH SYRINGE (SIMPLE): Performed by: INTERNAL MEDICINE

## 2018-02-26 PROCEDURE — 25010000002 ENOXAPARIN PER 10 MG: Performed by: HOSPITALIST

## 2018-02-26 PROCEDURE — 86900 BLOOD TYPING SEROLOGIC ABO: CPT | Performed by: HOSPITALIST

## 2018-02-26 PROCEDURE — 85730 THROMBOPLASTIN TIME PARTIAL: CPT | Performed by: INTERNAL MEDICINE

## 2018-02-26 RX ORDER — AMLODIPINE BESYLATE 10 MG/1
10 TABLET ORAL 2 TIMES DAILY
Status: DISCONTINUED | OUTPATIENT
Start: 2018-02-26 | End: 2018-02-28 | Stop reason: HOSPADM

## 2018-02-26 RX ORDER — WARFARIN SODIUM 5 MG/1
5 TABLET ORAL
Status: COMPLETED | OUTPATIENT
Start: 2018-02-26 | End: 2018-02-26

## 2018-02-26 RX ADMIN — ENOXAPARIN SODIUM 100 MG: 100 INJECTION SUBCUTANEOUS at 18:43

## 2018-02-26 RX ADMIN — PANTOPRAZOLE SODIUM 40 MG: 40 TABLET, DELAYED RELEASE ORAL at 18:42

## 2018-02-26 RX ADMIN — PANTOPRAZOLE SODIUM 40 MG: 40 TABLET, DELAYED RELEASE ORAL at 05:24

## 2018-02-26 RX ADMIN — ACETAMINOPHEN 650 MG: 325 TABLET, FILM COATED ORAL at 05:25

## 2018-02-26 RX ADMIN — CEFTRIAXONE 2 G: 2 INJECTION, POWDER, FOR SOLUTION INTRAMUSCULAR; INTRAVENOUS at 13:59

## 2018-02-26 RX ADMIN — CARVEDILOL 3.12 MG: 3.12 TABLET, FILM COATED ORAL at 08:01

## 2018-02-26 RX ADMIN — SERTRALINE 100 MG: 100 TABLET, FILM COATED ORAL at 18:42

## 2018-02-26 RX ADMIN — AMLODIPINE BESYLATE 5 MG: 5 TABLET ORAL at 08:01

## 2018-02-26 RX ADMIN — BACLOFEN 10 MG: 10 TABLET ORAL at 22:24

## 2018-02-26 RX ADMIN — LEVOTHYROXINE SODIUM 88 MCG: 88 TABLET ORAL at 05:24

## 2018-02-26 RX ADMIN — MONTELUKAST 10 MG: 10 TABLET, FILM COATED ORAL at 22:24

## 2018-02-26 RX ADMIN — AMLODIPINE BESYLATE 10 MG: 10 TABLET ORAL at 22:24

## 2018-02-26 RX ADMIN — BACLOFEN 10 MG: 10 TABLET ORAL at 08:01

## 2018-02-26 RX ADMIN — WARFARIN SODIUM 5 MG: 5 TABLET ORAL at 18:43

## 2018-02-26 RX ADMIN — AZELASTINE HYDROCHLORIDE 1 SPRAY: 137 SPRAY, METERED NASAL at 08:01

## 2018-02-26 RX ADMIN — OXYBUTYNIN CHLORIDE 10 MG: 10 TABLET, FILM COATED, EXTENDED RELEASE ORAL at 08:01

## 2018-02-26 RX ADMIN — VANCOMYCIN HYDROCHLORIDE 500 MG: 500 INJECTION, POWDER, LYOPHILIZED, FOR SOLUTION INTRAVENOUS at 11:30

## 2018-02-26 RX ADMIN — CARVEDILOL 3.12 MG: 3.12 TABLET, FILM COATED ORAL at 22:26

## 2018-02-26 RX ADMIN — ACETAMINOPHEN 650 MG: 325 TABLET, FILM COATED ORAL at 13:58

## 2018-02-26 RX ADMIN — BACLOFEN 10 MG: 10 TABLET ORAL at 15:17

## 2018-02-26 RX ADMIN — DOCUSATE SODIUM -SENNOSIDES 1 TABLET: 50; 8.6 TABLET, COATED ORAL at 22:23

## 2018-02-27 ENCOUNTER — APPOINTMENT (OUTPATIENT)
Dept: GENERAL RADIOLOGY | Facility: HOSPITAL | Age: 83
End: 2018-02-27
Attending: ORTHOPAEDIC SURGERY

## 2018-02-27 LAB
ABO + RH BLD: NORMAL
ABO + RH BLD: NORMAL
ANION GAP SERPL CALCULATED.3IONS-SCNC: 14.1 MMOL/L
BASOPHILS # BLD AUTO: 0.14 10*3/MM3 (ref 0–0.2)
BASOPHILS NFR BLD AUTO: 1.6 % (ref 0–1.5)
BH BB BLOOD EXPIRATION DATE: NORMAL
BH BB BLOOD EXPIRATION DATE: NORMAL
BH BB BLOOD TYPE BARCODE: 5100
BH BB BLOOD TYPE BARCODE: 5100
BH BB DISPENSE STATUS: NORMAL
BH BB DISPENSE STATUS: NORMAL
BH BB PRODUCT CODE: NORMAL
BH BB PRODUCT CODE: NORMAL
BH BB UNIT NUMBER: NORMAL
BH BB UNIT NUMBER: NORMAL
BUN BLD-MCNC: 18 MG/DL (ref 8–23)
BUN/CREAT SERPL: 12.2 (ref 7–25)
CALCIUM SPEC-SCNC: 9.1 MG/DL (ref 8.6–10.5)
CHLORIDE SERPL-SCNC: 97 MMOL/L (ref 98–107)
CO2 SERPL-SCNC: 25.9 MMOL/L (ref 22–29)
CREAT BLD-MCNC: 1.48 MG/DL (ref 0.57–1)
CROSSMATCH INTERPRETATION: NORMAL
CROSSMATCH INTERPRETATION: NORMAL
DEPRECATED RDW RBC AUTO: 49.3 FL (ref 37–54)
EOSINOPHIL # BLD AUTO: 1.01 10*3/MM3 (ref 0–0.7)
EOSINOPHIL NFR BLD AUTO: 11.2 % (ref 0.3–6.2)
ERYTHROCYTE [DISTWIDTH] IN BLOOD BY AUTOMATED COUNT: 14.6 % (ref 11.7–13)
GFR SERPL CREATININE-BSD FRML MDRD: 34 ML/MIN/1.73
GLUCOSE BLD-MCNC: 154 MG/DL (ref 65–99)
HCT VFR BLD AUTO: 32 % (ref 35.6–45.5)
HGB BLD-MCNC: 10.1 G/DL (ref 11.9–15.5)
IMM GRANULOCYTES # BLD: 0.07 10*3/MM3 (ref 0–0.03)
IMM GRANULOCYTES NFR BLD: 0.8 % (ref 0–0.5)
INR PPP: 1.25 (ref 0.9–1.1)
LYMPHOCYTES # BLD AUTO: 1.53 10*3/MM3 (ref 0.9–4.8)
LYMPHOCYTES NFR BLD AUTO: 17 % (ref 19.6–45.3)
MCH RBC QN AUTO: 28.9 PG (ref 26.9–32)
MCHC RBC AUTO-ENTMCNC: 31.6 G/DL (ref 32.4–36.3)
MCV RBC AUTO: 91.7 FL (ref 80.5–98.2)
MONOCYTES # BLD AUTO: 0.67 10*3/MM3 (ref 0.2–1.2)
MONOCYTES NFR BLD AUTO: 7.5 % (ref 5–12)
NEUTROPHILS # BLD AUTO: 5.56 10*3/MM3 (ref 1.9–8.1)
NEUTROPHILS NFR BLD AUTO: 61.9 % (ref 42.7–76)
PLATELET # BLD AUTO: 350 10*3/MM3 (ref 140–500)
PMV BLD AUTO: 9.5 FL (ref 6–12)
POTASSIUM BLD-SCNC: 3.5 MMOL/L (ref 3.5–5.2)
PROTHROMBIN TIME: 15.5 SECONDS (ref 11.7–14.2)
RBC # BLD AUTO: 3.49 10*6/MM3 (ref 3.9–5.2)
SODIUM BLD-SCNC: 137 MMOL/L (ref 136–145)
UNIT  ABO: NORMAL
UNIT  ABO: NORMAL
UNIT  RH: NORMAL
UNIT  RH: NORMAL
VANCOMYCIN SERPL-MCNC: 17.3 MCG/ML (ref 5–40)
WBC NRBC COR # BLD: 8.98 10*3/MM3 (ref 4.5–10.7)

## 2018-02-27 PROCEDURE — 85610 PROTHROMBIN TIME: CPT | Performed by: HOSPITALIST

## 2018-02-27 PROCEDURE — 80202 ASSAY OF VANCOMYCIN: CPT | Performed by: INTERNAL MEDICINE

## 2018-02-27 PROCEDURE — 25010000002 CEFTRIAXONE PER 250 MG: Performed by: INTERNAL MEDICINE

## 2018-02-27 PROCEDURE — 73610 X-RAY EXAM OF ANKLE: CPT

## 2018-02-27 PROCEDURE — P9016 RBC LEUKOCYTES REDUCED: HCPCS

## 2018-02-27 PROCEDURE — 25010000002 ENOXAPARIN PER 10 MG: Performed by: HOSPITALIST

## 2018-02-27 PROCEDURE — 97110 THERAPEUTIC EXERCISES: CPT

## 2018-02-27 PROCEDURE — 86900 BLOOD TYPING SEROLOGIC ABO: CPT

## 2018-02-27 PROCEDURE — 99232 SBSQ HOSP IP/OBS MODERATE 35: CPT | Performed by: INTERNAL MEDICINE

## 2018-02-27 PROCEDURE — 36430 TRANSFUSION BLD/BLD COMPNT: CPT

## 2018-02-27 PROCEDURE — 85025 COMPLETE CBC W/AUTO DIFF WBC: CPT | Performed by: INTERNAL MEDICINE

## 2018-02-27 PROCEDURE — 80048 BASIC METABOLIC PNL TOTAL CA: CPT | Performed by: HOSPITALIST

## 2018-02-27 PROCEDURE — 73630 X-RAY EXAM OF FOOT: CPT

## 2018-02-27 RX ORDER — HYDRALAZINE HYDROCHLORIDE 25 MG/1
25 TABLET, FILM COATED ORAL EVERY 8 HOURS SCHEDULED
Status: DISCONTINUED | OUTPATIENT
Start: 2018-02-27 | End: 2018-02-28 | Stop reason: HOSPADM

## 2018-02-27 RX ORDER — WARFARIN SODIUM 7.5 MG/1
7.5 TABLET ORAL
Status: COMPLETED | OUTPATIENT
Start: 2018-02-27 | End: 2018-02-27

## 2018-02-27 RX ADMIN — CARVEDILOL 3.12 MG: 3.12 TABLET, FILM COATED ORAL at 18:08

## 2018-02-27 RX ADMIN — HYDRALAZINE HYDROCHLORIDE 25 MG: 25 TABLET ORAL at 13:48

## 2018-02-27 RX ADMIN — PANTOPRAZOLE SODIUM 40 MG: 40 TABLET, DELAYED RELEASE ORAL at 18:08

## 2018-02-27 RX ADMIN — BACLOFEN 10 MG: 10 TABLET ORAL at 15:07

## 2018-02-27 RX ADMIN — BACLOFEN 10 MG: 10 TABLET ORAL at 19:52

## 2018-02-27 RX ADMIN — ENOXAPARIN SODIUM 100 MG: 100 INJECTION SUBCUTANEOUS at 18:09

## 2018-02-27 RX ADMIN — SERTRALINE 100 MG: 100 TABLET, FILM COATED ORAL at 18:08

## 2018-02-27 RX ADMIN — LEVOTHYROXINE SODIUM 88 MCG: 88 TABLET ORAL at 05:15

## 2018-02-27 RX ADMIN — ENOXAPARIN SODIUM 100 MG: 100 INJECTION SUBCUTANEOUS at 05:15

## 2018-02-27 RX ADMIN — SODIUM CHLORIDE 750 MG: 900 INJECTION, SOLUTION INTRAVENOUS at 12:58

## 2018-02-27 RX ADMIN — BACLOFEN 10 MG: 10 TABLET ORAL at 08:30

## 2018-02-27 RX ADMIN — HYDRALAZINE HYDROCHLORIDE 25 MG: 25 TABLET ORAL at 19:52

## 2018-02-27 RX ADMIN — WARFARIN SODIUM 7.5 MG: 7.5 TABLET ORAL at 18:09

## 2018-02-27 RX ADMIN — OXYBUTYNIN CHLORIDE 10 MG: 10 TABLET, FILM COATED, EXTENDED RELEASE ORAL at 08:30

## 2018-02-27 RX ADMIN — PANTOPRAZOLE SODIUM 40 MG: 40 TABLET, DELAYED RELEASE ORAL at 05:15

## 2018-02-27 RX ADMIN — ACETAMINOPHEN 650 MG: 325 TABLET, FILM COATED ORAL at 12:57

## 2018-02-27 RX ADMIN — MONTELUKAST 10 MG: 10 TABLET, FILM COATED ORAL at 19:52

## 2018-02-27 RX ADMIN — CEFTRIAXONE 2 G: 2 INJECTION, POWDER, FOR SOLUTION INTRAMUSCULAR; INTRAVENOUS at 12:57

## 2018-02-27 RX ADMIN — DOCUSATE SODIUM -SENNOSIDES 1 TABLET: 50; 8.6 TABLET, COATED ORAL at 19:52

## 2018-02-27 RX ADMIN — AMLODIPINE BESYLATE 10 MG: 10 TABLET ORAL at 19:52

## 2018-02-27 RX ADMIN — CARVEDILOL 3.12 MG: 3.12 TABLET, FILM COATED ORAL at 08:30

## 2018-02-27 RX ADMIN — AZELASTINE HYDROCHLORIDE 1 SPRAY: 137 SPRAY, METERED NASAL at 08:30

## 2018-02-27 RX ADMIN — AMLODIPINE BESYLATE 10 MG: 10 TABLET ORAL at 08:30

## 2018-02-28 VITALS
SYSTOLIC BLOOD PRESSURE: 157 MMHG | HEIGHT: 66 IN | RESPIRATION RATE: 16 BRPM | WEIGHT: 218.26 LBS | DIASTOLIC BLOOD PRESSURE: 84 MMHG | OXYGEN SATURATION: 96 % | TEMPERATURE: 97.9 F | HEART RATE: 69 BPM | BODY MASS INDEX: 35.08 KG/M2

## 2018-02-28 LAB
ANION GAP SERPL CALCULATED.3IONS-SCNC: 13.1 MMOL/L
BASOPHILS # BLD AUTO: 0.14 10*3/MM3 (ref 0–0.2)
BASOPHILS NFR BLD AUTO: 1.7 % (ref 0–1.5)
BUN BLD-MCNC: 24 MG/DL (ref 8–23)
BUN/CREAT SERPL: 14 (ref 7–25)
CALCIUM SPEC-SCNC: 8.5 MG/DL (ref 8.6–10.5)
CHLORIDE SERPL-SCNC: 99 MMOL/L (ref 98–107)
CO2 SERPL-SCNC: 26.9 MMOL/L (ref 22–29)
CREAT BLD-MCNC: 1.71 MG/DL (ref 0.57–1)
DEPRECATED RDW RBC AUTO: 49.7 FL (ref 37–54)
EOSINOPHIL # BLD AUTO: 0.75 10*3/MM3 (ref 0–0.7)
EOSINOPHIL NFR BLD AUTO: 9.3 % (ref 0.3–6.2)
ERYTHROCYTE [DISTWIDTH] IN BLOOD BY AUTOMATED COUNT: 14.7 % (ref 11.7–13)
GFR SERPL CREATININE-BSD FRML MDRD: 29 ML/MIN/1.73
GLUCOSE BLD-MCNC: 99 MG/DL (ref 65–99)
HCT VFR BLD AUTO: 31 % (ref 35.6–45.5)
HGB BLD-MCNC: 9.7 G/DL (ref 11.9–15.5)
IMM GRANULOCYTES # BLD: 0.04 10*3/MM3 (ref 0–0.03)
IMM GRANULOCYTES NFR BLD: 0.5 % (ref 0–0.5)
INR PPP: 1.34 (ref 0.9–1.1)
INR PPP: 1.37 (ref 0.9–1.1)
LYMPHOCYTES # BLD AUTO: 1.54 10*3/MM3 (ref 0.9–4.8)
LYMPHOCYTES NFR BLD AUTO: 19.2 % (ref 19.6–45.3)
MCH RBC QN AUTO: 28.9 PG (ref 26.9–32)
MCHC RBC AUTO-ENTMCNC: 31.3 G/DL (ref 32.4–36.3)
MCV RBC AUTO: 92.3 FL (ref 80.5–98.2)
MONOCYTES # BLD AUTO: 0.92 10*3/MM3 (ref 0.2–1.2)
MONOCYTES NFR BLD AUTO: 11.5 % (ref 5–12)
NEUTROPHILS # BLD AUTO: 4.64 10*3/MM3 (ref 1.9–8.1)
NEUTROPHILS NFR BLD AUTO: 57.8 % (ref 42.7–76)
PLATELET # BLD AUTO: 327 10*3/MM3 (ref 140–500)
PMV BLD AUTO: 9.6 FL (ref 6–12)
POTASSIUM BLD-SCNC: 3.6 MMOL/L (ref 3.5–5.2)
PROTHROMBIN TIME: 16.4 SECONDS (ref 11.7–14.2)
PROTHROMBIN TIME: 16.7 SECONDS (ref 11.7–14.2)
RBC # BLD AUTO: 3.36 10*6/MM3 (ref 3.9–5.2)
SODIUM BLD-SCNC: 139 MMOL/L (ref 136–145)
VANCOMYCIN TROUGH SERPL-MCNC: 16.3 MCG/ML (ref 5–20)
WBC NRBC COR # BLD: 8.03 10*3/MM3 (ref 4.5–10.7)

## 2018-02-28 PROCEDURE — 25010000002 VANCOMYCIN 750 MG RECONSTITUTED SOLUTION: Performed by: INTERNAL MEDICINE

## 2018-02-28 PROCEDURE — 25010000002 ENOXAPARIN PER 10 MG: Performed by: HOSPITALIST

## 2018-02-28 PROCEDURE — 85025 COMPLETE CBC W/AUTO DIFF WBC: CPT | Performed by: HOSPITALIST

## 2018-02-28 PROCEDURE — 94799 UNLISTED PULMONARY SVC/PX: CPT

## 2018-02-28 PROCEDURE — 97110 THERAPEUTIC EXERCISES: CPT

## 2018-02-28 PROCEDURE — 85610 PROTHROMBIN TIME: CPT | Performed by: HOSPITALIST

## 2018-02-28 PROCEDURE — 25010000002 CEFTRIAXONE PER 250 MG: Performed by: INTERNAL MEDICINE

## 2018-02-28 PROCEDURE — 97166 OT EVAL MOD COMPLEX 45 MIN: CPT | Performed by: OCCUPATIONAL THERAPIST

## 2018-02-28 PROCEDURE — 80048 BASIC METABOLIC PNL TOTAL CA: CPT | Performed by: HOSPITALIST

## 2018-02-28 PROCEDURE — 97535 SELF CARE MNGMENT TRAINING: CPT | Performed by: OCCUPATIONAL THERAPIST

## 2018-02-28 PROCEDURE — 80202 ASSAY OF VANCOMYCIN: CPT | Performed by: HOSPITALIST

## 2018-02-28 RX ORDER — WARFARIN SODIUM 5 MG/1
TABLET ORAL
Qty: 30 TABLET | Refills: 0 | Status: SHIPPED | OUTPATIENT
Start: 2018-02-28

## 2018-02-28 RX ORDER — WARFARIN SODIUM 5 MG/1
5 TABLET ORAL
Status: DISCONTINUED | OUTPATIENT
Start: 2018-02-28 | End: 2018-02-28 | Stop reason: HOSPADM

## 2018-02-28 RX ORDER — HYDRALAZINE HYDROCHLORIDE 25 MG/1
25 TABLET, FILM COATED ORAL EVERY 8 HOURS SCHEDULED
Qty: 90 TABLET | Refills: 0 | Status: SHIPPED | OUTPATIENT
Start: 2018-02-28 | End: 2018-03-30

## 2018-02-28 RX ORDER — CARVEDILOL 3.12 MG/1
3.12 TABLET ORAL 2 TIMES DAILY WITH MEALS
Qty: 60 TABLET | Refills: 0 | Status: SHIPPED | OUTPATIENT
Start: 2018-02-28 | End: 2018-03-30

## 2018-02-28 RX ORDER — AMLODIPINE BESYLATE 10 MG/1
10 TABLET ORAL DAILY
Qty: 30 TABLET | Refills: 0 | Status: SHIPPED | OUTPATIENT
Start: 2018-02-28 | End: 2018-03-30

## 2018-02-28 RX ORDER — HYDROCODONE BITARTRATE AND ACETAMINOPHEN 5; 325 MG/1; MG/1
1 TABLET ORAL EVERY 4 HOURS PRN
Qty: 10 TABLET | Refills: 0 | Status: SHIPPED | OUTPATIENT
Start: 2018-02-28 | End: 2018-03-03

## 2018-02-28 RX ADMIN — BACLOFEN 10 MG: 10 TABLET ORAL at 09:34

## 2018-02-28 RX ADMIN — ENOXAPARIN SODIUM 100 MG: 100 INJECTION SUBCUTANEOUS at 04:55

## 2018-02-28 RX ADMIN — CEFTRIAXONE 2 G: 2 INJECTION, POWDER, FOR SOLUTION INTRAMUSCULAR; INTRAVENOUS at 12:40

## 2018-02-28 RX ADMIN — SODIUM CHLORIDE 750 MG: 900 INJECTION, SOLUTION INTRAVENOUS at 13:20

## 2018-02-28 RX ADMIN — ZOLPIDEM TARTRATE 5 MG: 5 TABLET ORAL at 01:03

## 2018-02-28 RX ADMIN — HYDRALAZINE HYDROCHLORIDE 25 MG: 25 TABLET ORAL at 09:34

## 2018-02-28 RX ADMIN — OXYBUTYNIN CHLORIDE 10 MG: 10 TABLET, FILM COATED, EXTENDED RELEASE ORAL at 09:34

## 2018-02-28 RX ADMIN — AZELASTINE HYDROCHLORIDE 1 SPRAY: 137 SPRAY, METERED NASAL at 09:35

## 2018-02-28 RX ADMIN — AMLODIPINE BESYLATE 10 MG: 10 TABLET ORAL at 09:34

## 2018-02-28 RX ADMIN — PANTOPRAZOLE SODIUM 40 MG: 40 TABLET, DELAYED RELEASE ORAL at 09:34

## 2018-02-28 RX ADMIN — LEVOTHYROXINE SODIUM 88 MCG: 88 TABLET ORAL at 09:34

## 2018-02-28 RX ADMIN — HYDRALAZINE HYDROCHLORIDE 25 MG: 25 TABLET ORAL at 13:20

## 2018-02-28 RX ADMIN — CARVEDILOL 3.12 MG: 3.12 TABLET, FILM COATED ORAL at 09:34

## 2018-02-28 RX ADMIN — ACETAMINOPHEN 325 MG: 325 TABLET, FILM COATED ORAL at 01:04

## 2018-03-09 ENCOUNTER — OFFICE VISIT (OUTPATIENT)
Dept: INFECTIOUS DISEASES | Facility: CLINIC | Age: 83
End: 2018-03-09

## 2018-03-09 VITALS
TEMPERATURE: 97.8 F | WEIGHT: 215 LBS | DIASTOLIC BLOOD PRESSURE: 88 MMHG | RESPIRATION RATE: 16 BRPM | SYSTOLIC BLOOD PRESSURE: 151 MMHG | HEART RATE: 68 BPM | BODY MASS INDEX: 34.55 KG/M2 | HEIGHT: 66 IN

## 2018-03-09 DIAGNOSIS — S90.511D INFECTED ABRASION OF RIGHT ANKLE, SUBSEQUENT ENCOUNTER: ICD-10-CM

## 2018-03-09 DIAGNOSIS — Z79.2 LONG TERM (CURRENT) USE OF ANTIBIOTICS: ICD-10-CM

## 2018-03-09 DIAGNOSIS — L08.9 INFECTED ABRASION OF RIGHT ANKLE, SUBSEQUENT ENCOUNTER: ICD-10-CM

## 2018-03-09 DIAGNOSIS — T81.40XD POSTOPERATIVE INFECTION, SUBSEQUENT ENCOUNTER: ICD-10-CM

## 2018-03-09 DIAGNOSIS — I26.99 OTHER ACUTE PULMONARY EMBOLISM WITHOUT ACUTE COR PULMONALE (HCC): Primary | ICD-10-CM

## 2018-03-09 PROCEDURE — 99214 OFFICE O/P EST MOD 30 MIN: CPT | Performed by: INTERNAL MEDICINE

## 2018-03-09 RX ORDER — MECLIZINE HCL 12.5 MG/1
12.5 TABLET ORAL 3 TIMES DAILY PRN
COMMUNITY

## 2018-03-09 RX ORDER — RANITIDINE 150 MG/1
300 TABLET ORAL 2 TIMES DAILY
COMMUNITY

## 2018-03-09 RX ORDER — SODIUM CHLORIDE/ALOE VERA
GEL (GRAM) NASAL AS NEEDED
COMMUNITY

## 2018-03-09 RX ORDER — SALIVA STIMULANT COMB. NO.7
GEL (GRAM) MUCOUS MEMBRANE AS NEEDED
COMMUNITY

## 2018-03-09 RX ORDER — HYDROCODONE BITARTRATE AND ACETAMINOPHEN 7.5; 325 MG/1; MG/1
1 TABLET ORAL EVERY 6 HOURS PRN
COMMUNITY

## 2018-03-09 RX ORDER — ACETAMINOPHEN 500 MG
500 TABLET ORAL EVERY 6 HOURS PRN
COMMUNITY

## 2018-03-09 RX ORDER — CLONIDINE HYDROCHLORIDE 0.1 MG/1
0.1 TABLET ORAL 2 TIMES DAILY
COMMUNITY

## 2018-03-09 RX ORDER — VALSARTAN 160 MG/1
160 TABLET ORAL DAILY
COMMUNITY

## 2018-03-09 NOTE — PROGRESS NOTES
CC: f/u Acute post-operative infection near ankle hardware - culture negative    HPI: Renae Tidwell is a 82 y.o. female here for acute post-operative infection near ankle hardware - culture negative. On 1/19/18 she underwent right ankle fusion, subtalar fusion, talonavicular fusion, right talar head osteotomy, and Achilles tenotomy. When she presented for her first post-op visit she was noted to have brownish drainage from the incision site. There was concern for infected hematoma. Therefore on 2/9/18 she went to OR for debridement. Brown fluid was encountered and cultured. It was too early for hardware to be removed.     Operative cultures returned as negative. I recommended a 4 week course of empiric vancomycin and ceftriaxone. She had a repeat hospitalization for new pulmonary embolism and also acute kidney injury. We watched her vanco levels closely and kidneys improved by time of discharge though creatinine was not quite back to baseline.     She says the ankle is healing well. She has mild intermittent dull pain worse w/ palpation and movement and relieved w/ rest. There is asociated swelling which is much improved. No erythema. The skin is very dry with scabbing. She sees Dr Summers next week. S he has tolerated antibiotics without rash or diarrhea.      Review of Systems:  No n/v/d    Past medical history:  Allergic rhinitis  L4-L5 compression fracture  Breast cancer  GERD  Basal cell carcinoma of nose  MRSA infection of neck  R ankle fracture  Acute post-operative infection near ankle hardware - culture negative    Past Surgical History:   Procedure Laterality Date   • BASAL CELL CARCINOMA EXCISION  2010    Nose   • BONE RESECTION  1987    Rib resections for thoracic outlet syndrome   • BREAST SURGERY      2012 right mastectomy and needle biopsy; 04/2013 left mastectomy for DCIS   • CHOLECYSTECTOMY  2003   • EYE SURGERY  2006    Bilateral cataract removal   • HYSTERECTOMY  1987    complete   • INCISION AND  DRAINAGE LEG Right 2/9/2018    Procedure: RT ANKLE INCISION AND DRAINAGE ;  Surgeon: Richardson Summers Jr., MD;  Location: [facility] OR;  Service:    • KNEE ARTHROSCOPY  2006    Right knee   • MASTECTOMY Right 2012    And needle biopsy   • NASAL SEPTUM SURGERY  1968   • OOPHORECTOMY  1987   • OTHER SURGICAL HISTORY  12/2008    Lumbar forminal and central stenosis requiring surgery   • PLANTAR'S WART EXCISION  2003   • NJ FUSION FOOT BONES,SUBTALAR Right 1/19/2018    Procedure: RIGHT ANKLE FUSION, SUBTALAR FUSION, ACHILLES LENGTHENING, TALONAVICULAR FUSION;  Surgeon: Richardson Summers Jr., MD;  Location: [facility] OR;  Service: Orthopedics   • ROTATOR CUFF REPAIR  2007    Right side   • TENDON REPAIR  05/2011    Of right foot   • TONSILLECTOMY  1952   • TOTAL KNEE ARTHROPLASTY Left 2004   • TOTAL SHOULDER ARTHROPLASTY W/ DISTAL CLAVICLE EXCISION Right 6/22/2017    Procedure: RIGHT TOTAL SHOULDER REVERSE ARTHROPLASTY W/ AUGMENT ;  Surgeon: Kofi Rivas MD;  Location: [facility];  Service:    • WRIST SURGERY  1970's    Four surgeries for ganglion cyst     Social History:  Lives in rehab facility  Retired educator     Family History:  Multiple 1st degree relatives w/ cancer     Allergies:  No Antibiotic Allergies    Medications:   Current Outpatient Prescriptions:   •  albuterol (PROVENTIL HFA;VENTOLIN HFA) 108 (90 BASE) MCG/ACT inhaler, Inhale 2 puffs Every 4 (Four) Hours As Needed., Disp: , Rfl:   •  amLODIPine (NORVASC) 10 MG tablet, Take 1 tablet by mouth Daily for 30 days., Disp: 30 tablet, Rfl: 0  •  azelastine (ASTELIN) 0.1 % nasal spray, 1 spray into each nostril 2 (Two) Times a Day. Use in each nostril as directed, Disp: , Rfl:   •  baclofen (LIORESAL) 10 MG tablet, Take 10 mg by mouth 3 (Three) Times a Day. While awake, Disp: , Rfl:   •  carvedilol (COREG) 3.125 MG tablet, Take 1 tablet by mouth 2 (Two) Times a Day With Meals for 30 days., Disp: 60 tablet, Rfl: 0  •  CefTRIAXone Sodium (CEFTRIAXONE IN SW 2  "GRAMS/20ML IV PUSH SYRINGE, SIMPLE,), Infuse 20 mL into a venous catheter Daily for 11 doses. Indications: Bone and/or Joint Infection, Disp: 220 mL, Rfl: 0  •  cetirizine (zyrTEC) 10 MG tablet, Take 10 mg by mouth Daily., Disp: , Rfl:   •  Cholecalciferol (VITAMIN D-3 PO), Take 1 tablet by mouth Daily. HOLD PRIOR TO SURG, Disp: , Rfl:   •  dexlansoprazole (DEXILANT) 60 MG capsule, Take 60 mg by mouth Every Evening., Disp: , Rfl:   •  docusate sodium (COLACE) 100 MG capsule, Take 100 mg by mouth 2 (Two) Times a Day., Disp: , Rfl:   •  hydrALAZINE (APRESOLINE) 25 MG tablet, Take 1 tablet by mouth Every 8 (Eight) Hours for 30 days., Disp: 90 tablet, Rfl: 0  •  levothyroxine (SYNTHROID, LEVOTHROID) 88 MCG tablet, Take 88 mcg by mouth Daily. DOES NOT TAKE ON Sunday\"S, Disp: , Rfl:   •  mometasone (NASONEX) 50 MCG/ACT nasal spray, 2 sprays into each nostril as needed., Disp: , Rfl:   •  montelukast (SINGULAIR) 10 MG tablet, Take 10 mg by mouth Every Night., Disp: , Rfl:   •  Multiple Vitamins-Minerals (OCUVITE PO), Take 1 tablet by mouth Daily. HOLD PRIOR TO SURG, Disp: , Rfl:   •  sertraline (ZOLOFT) 100 MG tablet, Take 100 mg by mouth every evening., Disp: , Rfl:   •  tolterodine LA (DETROL LA) 4 MG 24 hr capsule, Take 4 mg by mouth Daily., Disp: , Rfl:   •  vancomycin 750 mg/250 mL 0.9% NS add-vantage, Infuse 250 mL into a venous catheter Daily for 10 doses. Indications: Infection of the Skin and/or Related Soft Tissue, Disp: 2500 mL, Rfl: 0  •  warfarin (COUMADIN) 5 MG tablet, DAILY, Disp: 30 tablet, Rfl: 0  •  zolpidem (AMBIEN) 10 MG tablet, Take 10 mg by mouth at night as needed for sleep., Disp: , Rfl:     OBJECTIVE:  /88 (BP Location: Right arm, Patient Position: Sitting, Cuff Size: Large Adult)  Pulse 68  Temp 97.8 °F (36.6 °C)  Resp 16  Ht 167.6 cm (65.98\")  Wt 97.5 kg (215 lb) Comment: 3/7/18  BMI 34.72 kg/m2    General: awake, alert, NAD, very nice   Head: Normocephalic  Eyes: no scleral " icterus  Neck: Supple  Cardiovascular: NR, no LE edema  Respiratory: normal work of breathing on ambient air  GI: Abdomen is soft, non-tender, non-distended  : no Pino catheter present  Musculoskeletal: R ankle incision is healing; it is edematous but not erythematous or hot to touch; no drainage  Skin: No rashes, lesions, or embolic phenomenon  Neurological: Alert and oriented x 3, motor strength 5/5 in upper extremities  Psychiatric: Normal mood and affect   Vasc: no cyanosis; PICC in LUE w/o erythema      DIAGNOSTICS:  HH Labs 3/7/18:  WBC 6.4  Vanco trough 12.1  Crt 1.41  CRP 2.4 (previously 43.4)    Microbiology:  2/9 OR Ankle Cx: negative    ASSESSMENT/PLAN:  1. Acute post-operative infection near ankle hardware - culture negative  -s/p I&D 2/9/18; operative cultures negative  -excellent clinical and laboratory marker response to 4 weeks of vancomycin and ceftriaxone  -recommend DC PICC and stopping antibiotics today and monitoring off of therapy  -keep follow-up with Dr Summers next week  -no role for suppressive antibiotics     2. Pulmonary embolism and left lower extremity DVT  -now on warfarin     3. Acute kidney injury   -Crt improving on home labs  -she reports good urine output    4. Long term use of antibiotics  -DC PICC and HH labs today    RTC as needed
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
## (undated) DEVICE — PAD,ABDOMINAL,8"X10",ST,LF: Brand: MEDLINE

## (undated) DEVICE — ENCORE® LATEX ORTHO SIZE 8, STERILE LATEX POWDER-FREE SURGICAL GLOVE: Brand: ENCORE

## (undated) DEVICE — BNDG ESMARK 4IN 12FT LF STRL BLU

## (undated) DEVICE — GLV SURG BIOGEL LTX PF 8

## (undated) DEVICE — ST IRR CYSTO W/SPK 77IN LF

## (undated) DEVICE — 2108 SERIES SAGITTAL BLADE (19.5 X 1.27 X 81.0MM)

## (undated) DEVICE — SUT ETHLN 3/0 PS1 18IN 1663H

## (undated) DEVICE — DRSNG GZ CURAD XEROFORM NONADHS 5X9IN STRL

## (undated) DEVICE — DRP C/ARMOR

## (undated) DEVICE — DRSNG ADAPTIC 3X16

## (undated) DEVICE — SYR LL TP 10ML STRL

## (undated) DEVICE — SUT VIC 3/0 SH 27IN J416H

## (undated) DEVICE — BNDG ELAS CO-FLEX SLF ADHR 6IN 5YD LF STRL

## (undated) DEVICE — POOLE SUCTION HANDLE: Brand: CARDINAL HEALTH

## (undated) DEVICE — SPLNT PLSTR ORTHO 5IN

## (undated) DEVICE — MAT FLR ABSORBENT LG 4FT 10 2.5FT

## (undated) DEVICE — DRP C/ARM 41X74IN

## (undated) DEVICE — SPNG GZ WOVN 4X4IN 12PLY 10/BX STRL

## (undated) DEVICE — GW DYNANAIL TROC TP 3.1X500MM

## (undated) DEVICE — PK ORTHO MINOR 40

## (undated) DEVICE — UNDYED BRAIDED (POLYGLACTIN 910), SYNTHETIC ABSORBABLE SUTURE: Brand: COATED VICRYL

## (undated) DEVICE — SUT ETHIB 2 CV V37 MS/4 30IN MX69G

## (undated) DEVICE — BNDG ELAS ELITE V/CLOSE 6IN 5YD LF STRL

## (undated) DEVICE — GW DYNANAIL TROC TP 3.1X400MM

## (undated) DEVICE — DRAPE,U/ SHT,SPLIT,PLAS,STERIL: Brand: MEDLINE

## (undated) DEVICE — SPNG LAP 18X18IN LF STRL PK/5

## (undated) DEVICE — BANDAGE,GAUZE,BULKEE II,4.5"X4.1YD,STRL: Brand: MEDLINE

## (undated) DEVICE — PRECISION THIN (9.0 X 0.38 X 25.0MM)

## (undated) DEVICE — PREP IM ENCHANCED TOTAL HIP BONE                                    PREPARATION KIT: Brand: PREP-IM

## (undated) DEVICE — BNDG ELAS CO-FLEX SLF ADHR 4IN5YD LF STRL

## (undated) DEVICE — GLV SURG BIOGEL LTX PF 8 1/2

## (undated) DEVICE — UNDERCAST PADDING: Brand: DEROYAL

## (undated) DEVICE — STCKNT IMPERV 12IN STRL

## (undated) DEVICE — DRSNG PAD ABD 8X10IN STRL

## (undated) DEVICE — PK SHLDR OPN 40

## (undated) DEVICE — DISPOSABLE TOURNIQUET CUFF SINGLE BLADDER, SINGLE PORT AND QUICK CONNECT CONNECTOR: Brand: COLOR CUFF

## (undated) DEVICE — HUMERAL NOZZLE: Brand: REVOLUTION

## (undated) DEVICE — GLV SURG BIOGEL LTX PF 6 1/2

## (undated) DEVICE — APPL CHLORAPREP W/TINT 26ML ORNG

## (undated) DEVICE — SYS PERFUS SEP PLATLT W TIPS CUST

## (undated) DEVICE — KWIRE EQUINOXE GLENOID NITNL 2X190MM NS
Type: IMPLANTABLE DEVICE | Site: SHOULDER | Status: NON-FUNCTIONAL
Removed: 2017-06-22

## (undated) DEVICE — SCRW COMPR EQUINOXE LK 4.5X18MM
Type: IMPLANTABLE DEVICE | Site: SHOULDER | Status: NON-FUNCTIONAL
Removed: 2017-06-22

## (undated) DEVICE — OCCLUSIVE GAUZE STRIP,3% BISMUTH TRIBROMOPHENATE IN PETROLATUM BLEND: Brand: XEROFORM

## (undated) DEVICE — PROXIMATE RH ROTATING HEAD SKIN STAPLERS (35 WIDE) CONTAINS 35 STAINLESS STEEL STAPLES: Brand: PROXIMATE

## (undated) DEVICE — PREMIUM WET SKIN PREP TRAY: Brand: MEDLINE INDUSTRIES, INC.

## (undated) DEVICE — SCRW DYNANAIL NOHD PA 5X65MM
Type: IMPLANTABLE DEVICE | Site: ANKLE | Status: NON-FUNCTIONAL
Removed: 2018-01-19

## (undated) DEVICE — SKIN PREP TRAY W/CHG: Brand: MEDLINE INDUSTRIES, INC.

## (undated) DEVICE — HANDPIECE SET WITH COAXIAL HIGH FLOW TIP AND SUCTION TUBE: Brand: INTERPULSE

## (undated) DEVICE — SUT ETHLN 2/0 PS 18IN 585H

## (undated) DEVICE — SOL ISO/ALC RUB 70PCT 4OZ

## (undated) DEVICE — ARM SLING: Brand: DEROYAL

## (undated) DEVICE — Device

## (undated) DEVICE — KT DRL BME ELITE 3MM

## (undated) DEVICE — GOWN,PREVENTION PLUS,XLONG/XLARGE,STRL: Brand: MEDLINE

## (undated) DEVICE — GLV SURG SENSICARE MICRO PF LF 6.5 STRL

## (undated) DEVICE — GLV SURG SENSICARE GREEN W/ALOE PF LF 8.5 STRL

## (undated) DEVICE — SHEET, DRAPE, SPLIT, STERILE: Brand: MEDLINE

## (undated) DEVICE — STPLR SKIN VISISTAT WD 35CT

## (undated) DEVICE — SUT PDS 2 0 CT1 27IN CLR Z259H

## (undated) DEVICE — CEMENT MIXING SYSTEM WITH FEMORAL BREAKWAY NOZZLE: Brand: REVOLUTION

## (undated) DEVICE — SUT VIC 2/0 X1 27IN J459H